# Patient Record
Sex: FEMALE | Race: WHITE | Employment: OTHER | URBAN - METROPOLITAN AREA
[De-identification: names, ages, dates, MRNs, and addresses within clinical notes are randomized per-mention and may not be internally consistent; named-entity substitution may affect disease eponyms.]

---

## 2017-01-04 RX ORDER — FUROSEMIDE 20 MG/1
20 TABLET ORAL DAILY
Qty: 30 TAB | Refills: 2 | Status: SHIPPED | OUTPATIENT
Start: 2017-01-04 | End: 2017-04-25 | Stop reason: SDUPTHER

## 2017-01-04 RX ORDER — GLIMEPIRIDE 1 MG/1
1 TABLET ORAL
Qty: 30 TAB | Refills: 2 | Status: SHIPPED | OUTPATIENT
Start: 2017-01-04 | End: 2017-05-19 | Stop reason: SDUPTHER

## 2017-01-04 RX ORDER — INSULIN GLARGINE 100 [IU]/ML
INJECTION, SOLUTION SUBCUTANEOUS
Qty: 4 EACH | Refills: 2 | Status: SHIPPED | OUTPATIENT
Start: 2017-01-04 | End: 2017-03-10 | Stop reason: SDUPTHER

## 2017-01-13 ENCOUNTER — HOSPITAL ENCOUNTER (EMERGENCY)
Age: 67
Discharge: HOME | End: 2017-01-13
Attending: EMERGENCY MEDICINE
Payer: MEDICARE

## 2017-01-13 ENCOUNTER — APPOINTMENT (OUTPATIENT)
Dept: CT IMAGING | Age: 67
End: 2017-01-13
Attending: EMERGENCY MEDICINE
Payer: MEDICARE

## 2017-01-13 VITALS
DIASTOLIC BLOOD PRESSURE: 64 MMHG | BODY MASS INDEX: 39.8 KG/M2 | HEART RATE: 90 BPM | WEIGHT: 278 LBS | TEMPERATURE: 98 F | RESPIRATION RATE: 16 BRPM | SYSTOLIC BLOOD PRESSURE: 146 MMHG | HEIGHT: 70 IN | OXYGEN SATURATION: 94 %

## 2017-01-13 DIAGNOSIS — K59.01 SLOW TRANSIT CONSTIPATION: Primary | ICD-10-CM

## 2017-01-13 LAB
ALBUMIN SERPL BCP-MCNC: 2.9 G/DL (ref 3.5–5)
ALBUMIN/GLOB SERPL: 0.7 {RATIO} (ref 1.1–2.2)
ALP SERPL-CCNC: 140 U/L (ref 45–117)
ALT SERPL-CCNC: 37 U/L (ref 12–78)
ANION GAP BLD CALC-SCNC: 10 MMOL/L (ref 5–15)
APPEARANCE UR: ABNORMAL
AST SERPL W P-5'-P-CCNC: 33 U/L (ref 15–37)
BACTERIA URNS QL MICRO: NEGATIVE /HPF
BASOPHILS # BLD AUTO: 0 K/UL (ref 0–0.1)
BASOPHILS # BLD: 0 % (ref 0–1)
BILIRUB SERPL-MCNC: 0.6 MG/DL (ref 0.2–1)
BILIRUB UR QL: NEGATIVE
BUN SERPL-MCNC: 21 MG/DL (ref 6–20)
BUN/CREAT SERPL: 20 (ref 12–20)
CALCIUM SERPL-MCNC: 8.6 MG/DL (ref 8.5–10.1)
CHLORIDE SERPL-SCNC: 98 MMOL/L (ref 97–108)
CO2 SERPL-SCNC: 25 MMOL/L (ref 21–32)
COLOR UR: ABNORMAL
CREAT SERPL-MCNC: 1.06 MG/DL (ref 0.55–1.02)
EOSINOPHIL # BLD: 0.1 K/UL (ref 0–0.4)
EOSINOPHIL NFR BLD: 1 % (ref 0–7)
EPITH CASTS URNS QL MICRO: ABNORMAL /LPF
ERYTHROCYTE [DISTWIDTH] IN BLOOD BY AUTOMATED COUNT: 12.9 % (ref 11.5–14.5)
GLOBULIN SER CALC-MCNC: 4 G/DL (ref 2–4)
GLUCOSE SERPL-MCNC: 258 MG/DL (ref 65–100)
GLUCOSE UR STRIP.AUTO-MCNC: 500 MG/DL
HCT VFR BLD AUTO: 37.5 % (ref 35–47)
HGB BLD-MCNC: 12.6 G/DL (ref 11.5–16)
HGB UR QL STRIP: ABNORMAL
HYALINE CASTS URNS QL MICRO: ABNORMAL /LPF (ref 0–5)
KETONES UR QL STRIP.AUTO: ABNORMAL MG/DL
LEUKOCYTE ESTERASE UR QL STRIP.AUTO: NEGATIVE
LIPASE SERPL-CCNC: 65 U/L (ref 73–393)
LYMPHOCYTES # BLD AUTO: 21 % (ref 12–49)
LYMPHOCYTES # BLD: 1.1 K/UL (ref 0.8–3.5)
MCH RBC QN AUTO: 31.9 PG (ref 26–34)
MCHC RBC AUTO-ENTMCNC: 33.6 G/DL (ref 30–36.5)
MCV RBC AUTO: 94.9 FL (ref 80–99)
MONOCYTES # BLD: 0.6 K/UL (ref 0–1)
MONOCYTES NFR BLD AUTO: 12 % (ref 5–13)
NEUTS SEG # BLD: 3.3 K/UL (ref 1.8–8)
NEUTS SEG NFR BLD AUTO: 66 % (ref 32–75)
NITRITE UR QL STRIP.AUTO: NEGATIVE
PH UR STRIP: 5.5 [PH] (ref 5–8)
PLATELET # BLD AUTO: 161 K/UL (ref 150–400)
POTASSIUM SERPL-SCNC: 3.8 MMOL/L (ref 3.5–5.1)
PROT SERPL-MCNC: 6.9 G/DL (ref 6.4–8.2)
PROT UR STRIP-MCNC: 100 MG/DL
RBC # BLD AUTO: 3.95 M/UL (ref 3.8–5.2)
RBC #/AREA URNS HPF: ABNORMAL /HPF (ref 0–5)
SODIUM SERPL-SCNC: 133 MMOL/L (ref 136–145)
SP GR UR REFRACTOMETRY: 1.01 (ref 1–1.03)
UA: UC IF INDICATED,UAUC: ABNORMAL
UROBILINOGEN UR QL STRIP.AUTO: 0.2 EU/DL (ref 0.2–1)
WBC # BLD AUTO: 5 K/UL (ref 3.6–11)
WBC URNS QL MICRO: ABNORMAL /HPF (ref 0–4)

## 2017-01-13 PROCEDURE — 81001 URINALYSIS AUTO W/SCOPE: CPT | Performed by: EMERGENCY MEDICINE

## 2017-01-13 PROCEDURE — 74176 CT ABD & PELVIS W/O CONTRAST: CPT

## 2017-01-13 PROCEDURE — 74011000250 HC RX REV CODE- 250: Performed by: EMERGENCY MEDICINE

## 2017-01-13 PROCEDURE — 83690 ASSAY OF LIPASE: CPT | Performed by: EMERGENCY MEDICINE

## 2017-01-13 PROCEDURE — 99284 EMERGENCY DEPT VISIT MOD MDM: CPT

## 2017-01-13 PROCEDURE — 80053 COMPREHEN METABOLIC PANEL: CPT | Performed by: EMERGENCY MEDICINE

## 2017-01-13 PROCEDURE — 85025 COMPLETE CBC W/AUTO DIFF WBC: CPT | Performed by: EMERGENCY MEDICINE

## 2017-01-13 PROCEDURE — 74011250636 HC RX REV CODE- 250/636: Performed by: EMERGENCY MEDICINE

## 2017-01-13 PROCEDURE — 36415 COLL VENOUS BLD VENIPUNCTURE: CPT | Performed by: EMERGENCY MEDICINE

## 2017-01-13 PROCEDURE — 96374 THER/PROPH/DIAG INJ IV PUSH: CPT

## 2017-01-13 RX ORDER — PROCHLORPERAZINE EDISYLATE 5 MG/ML
10 INJECTION INTRAMUSCULAR; INTRAVENOUS
Status: DISCONTINUED | OUTPATIENT
Start: 2017-01-13 | End: 2017-01-13

## 2017-01-13 RX ORDER — MAGNESIUM CITRATE
296 SOLUTION, ORAL ORAL
Qty: 1 BOTTLE | Refills: 3 | Status: SHIPPED | OUTPATIENT
Start: 2017-01-13 | End: 2017-01-13

## 2017-01-13 RX ORDER — POLYETHYLENE GLYCOL 3350, SODIUM SULFATE ANHYDROUS, SODIUM BICARBONATE, SODIUM CHLORIDE, POTASSIUM CHLORIDE 236; 22.74; 6.74; 5.86; 2.97 G/4L; G/4L; G/4L; G/4L; G/4L
480 POWDER, FOR SOLUTION ORAL
Qty: 480 ML | Refills: 0 | Status: SHIPPED | OUTPATIENT
Start: 2017-01-13 | End: 2017-01-13

## 2017-01-13 RX ORDER — HYDROMORPHONE HYDROCHLORIDE 1 MG/ML
1 INJECTION, SOLUTION INTRAMUSCULAR; INTRAVENOUS; SUBCUTANEOUS
Status: DISCONTINUED | OUTPATIENT
Start: 2017-01-13 | End: 2017-01-13 | Stop reason: HOSPADM

## 2017-01-13 RX ADMIN — SODIUM CHLORIDE 10 MG: 9 INJECTION INTRAMUSCULAR; INTRAVENOUS; SUBCUTANEOUS at 12:38

## 2017-01-13 NOTE — DISCHARGE INSTRUCTIONS
Constipation: Care Instructions  Your Care Instructions  Constipation means that you have a hard time passing stools (bowel movements). People pass stools from 3 times a day to once every 3 days. What is normal for you may be different. Constipation may occur with pain in the rectum and cramping. The pain may get worse when you try to pass stools. Sometimes there are small amounts of bright red blood on toilet paper or the surface of stools. This is because of enlarged veins near the rectum (hemorrhoids). A few changes in your diet and lifestyle may help you avoid ongoing constipation. Your doctor may also prescribe medicine to help loosen your stool. Some medicines can cause constipation. These include pain medicines and antidepressants. Tell your doctor about all the medicines you take. Your doctor may want to make a medicine change to ease your symptoms. Follow-up care is a key part of your treatment and safety. Be sure to make and go to all appointments, and call your doctor if you are having problems. It's also a good idea to know your test results and keep a list of the medicines you take. How can you care for yourself at home? · Drink plenty of fluids, enough so that your urine is light yellow or clear like water. If you have kidney, heart, or liver disease and have to limit fluids, talk with your doctor before you increase the amount of fluids you drink. · Include high-fiber foods in your diet each day. These include fruits, vegetables, beans, and whole grains. · Get at least 30 minutes of exercise on most days of the week. Walking is a good choice. You also may want to do other activities, such as running, swimming, cycling, or playing tennis or team sports. · Take a fiber supplement, such as Citrucel or Metamucil, every day. Read and follow all instructions on the label. · Schedule time each day for a bowel movement. A daily routine may help.  Take your time having your bowel movement. · Support your feet with a small step stool when you sit on the toilet. This helps flex your hips and places your pelvis in a squatting position. · Your doctor may recommend an over-the-counter laxative to relieve your constipation. Examples are Milk of Magnesia and MiraLax. Read and follow all instructions on the label. Do not use laxatives on a long-term basis. When should you call for help? Call your doctor now or seek immediate medical care if:  · You have new or worse belly pain. · You have new or worse nausea or vomiting. · You have blood in your stools. Watch closely for changes in your health, and be sure to contact your doctor if:  · Your constipation is getting worse. · You do not get better as expected. Where can you learn more? Go to http://sonam-douglas.info/. Enter 21 [phone] in the search box to learn more about \"Constipation: Care Instructions. \"  Current as of: May 27, 2016  Content Version: 11.1  © 8147-9536 [employer]. Care instructions adapted under license by [employer] (which disclaims liability or warranty for this information). If you have questions about a medical condition or this instruction, always ask your healthcare professional. [address] any warranty or liability for your use of this information. We hope that we have addressed all of your medical concerns. The examination and treatment you received in the Emergency Department were for an emergent problem and were not intended as complete care. It is important that you follow up with your healthcare provider(s) for ongoing care. If your symptoms worsen or do not improve as expected, and you are unable to reach your usual health care provider(s), you should return to the Emergency Department. Today's healthcare is undergoing tremendous change, and patient satisfaction surveys are one of the many tools to assess the quality of medical care. You may receive a survey from the [employer] regarding your experience in the Emergency Department. I hope that your experience has been completely positive, particularly the medical care that I provided. As such, please participate in the survey; anything less than excellent does not meet my expectations or intentions. [facility] and [facility] participate in nationally recognized quality of care measures. If your blood pressure is greater than 120/80, as reported below, we urge that you seek medical care to address the potential of high blood pressure, commonly known as hypertension. Hypertension can be hereditary or can be caused by certain medical conditions, pain, stress, or \"white coat syndrome. \"       Please make an appointment with your health care provider(s) for follow up of your Emergency Department visit. VITALS:   Patient Vitals for the past 8 hrs:   Temp Pulse Resp BP SpO2   01/13/17 1053 98 °F (36.7 °C) (!) 101 14 188/78 96 %          Thank you for allowing us to provide you with medical care today. We realize that you have many choices for your emergency care needs. Please choose us in the future for any continued health care needs. Priyank Craig, [address] [place] [address].   Office: [phone]            Recent Results (from the past 24 hour(s))   CBC WITH AUTOMATED DIFF    Collection Time: 01/13/17 10:59 AM   Result Value Ref Range    WBC 5.0 3.6 - 11.0 K/uL    RBC 3.95 3.80 - 5.20 M/uL    HGB 12.6 11.5 - 16.0 g/dL    HCT 37.5 35.0 - 47.0 %    MCV 94.9 80.0 - 99.0 FL    MCH 31.9 26.0 - 34.0 PG    MCHC 33.6 30.0 - 36.5 g/dL    RDW 12.9 11.5 - 14.5 %    PLATELET 124 940 - 914 K/uL    NEUTROPHILS 66 32 - 75 %    LYMPHOCYTES 21 12 - 49 %    MONOCYTES 12 5 - 13 %    EOSINOPHILS 1 0 - 7 %    BASOPHILS 0 0 - 1 %    ABS. NEUTROPHILS 3.3 1.8 - 8.0 K/UL    ABS.  LYMPHOCYTES 1.1 0.8 - 3.5 K/UL ABS. MONOCYTES 0.6 0.0 - 1.0 K/UL    ABS. EOSINOPHILS 0.1 0.0 - 0.4 K/UL    ABS. BASOPHILS 0.0 0.0 - 0.1 K/UL   METABOLIC PANEL, COMPREHENSIVE    Collection Time: 01/13/17 10:59 AM   Result Value Ref Range    Sodium 133 (L) 136 - 145 mmol/L    Potassium 3.8 3.5 - 5.1 mmol/L    Chloride 98 97 - 108 mmol/L    CO2 25 21 - 32 mmol/L    Anion gap 10 5 - 15 mmol/L    Glucose 258 (H) 65 - 100 mg/dL    BUN 21 (H) 6 - 20 MG/DL    Creatinine 1.06 (H) 0.55 - 1.02 MG/DL    BUN/Creatinine ratio 20 12 - 20      GFR est AA >60 >60 ml/min/1.73m2    GFR est non-AA 52 (L) >60 ml/min/1.73m2    Calcium 8.6 8.5 - 10.1 MG/DL    Bilirubin, total 0.6 0.2 - 1.0 MG/DL    ALT 37 12 - 78 U/L    AST 33 15 - 37 U/L    Alk. phosphatase 140 (H) 45 - 117 U/L    Protein, total 6.9 6.4 - 8.2 g/dL    Albumin 2.9 (L) 3.5 - 5.0 g/dL    Globulin 4.0 2.0 - 4.0 g/dL    A-G Ratio 0.7 (L) 1.1 - 2.2     LIPASE    Collection Time: 01/13/17 10:59 AM   Result Value Ref Range    Lipase 65 (L) 73 - 393 U/L   URINALYSIS W/ REFLEX CULTURE    Collection Time: 01/13/17  1:33 PM   Result Value Ref Range    Color YELLOW/STRAW      Appearance TURBID (A) CLEAR      Specific gravity 1.014 1.003 - 1.030      pH (UA) 5.5 5.0 - 8.0      Protein 100 (A) NEG mg/dL    Glucose 500 (A) NEG mg/dL    Ketone TRACE (A) NEG mg/dL    Bilirubin NEGATIVE  NEG      Blood TRACE (A) NEG      Urobilinogen 0.2 0.2 - 1.0 EU/dL    Nitrites NEGATIVE  NEG      Leukocyte Esterase NEGATIVE  NEG      WBC 0-4 0 - 4 /hpf    RBC 5-10 0 - 5 /hpf    Epithelial cells MODERATE (A) FEW /lpf    Bacteria NEGATIVE  NEG /hpf    UA:UC IF INDICATED CULTURE NOT INDICATED BY UA RESULT CNI      Hyaline Cast 2-5 0 - 5 /lpf       Ct Abd Pelv Wo Cont    Result Date: 1/13/2017  INDICATION: diffuse abdom pain with nausea - bowel issue. History of breast cancer. COMPARISON: None TECHNIQUE: Thin axial images were obtained through the abdomen and pelvis. Coronal and sagittal reconstructions were generated.  Oral contrast was not administered. CT dose reduction was achieved through use of a standardized protocol tailored for this examination and automatic exposure control for dose modulation. The absence of intravenous contrast material reduces the sensitivity for evaluation of the solid parenchymal organs of the abdomen. FINDINGS: LUNG BASES: Clear. INCIDENTALLY IMAGED HEART AND MEDIASTINUM: Unremarkable. LIVER: No mass or biliary dilatation. The liver is hypodense related to hepatic steatosis. GALLBLADDER: Surgically absent. SPLEEN: No mass. PANCREAS: No mass or ductal dilatation. ADRENALS: Unremarkable KIDNEYS/URETERS: No mass, calculus, or hydronephrosis. The right kidney is incompletely rotated which is incidental. STOMACH: Unremarkable. SMALL BOWEL: No dilatation or wall thickening. COLON: No dilatation or wall thickening. APPENDIX: Unremarkable. PERITONEUM: No ascites or pneumoperitoneum. RETROPERITONEUM: No lymphadenopathy or aortic aneurysm. REPRODUCTIVE ORGANS: The patient is status post hysterectomy. URINARY BLADDER: No mass or calculus. BONES: No destructive bone lesion. Degenerative changes are present in the spine. ADDITIONAL COMMENTS: There is a fat-containing hernia. IMPRESSION: No evidence of acute process. Incidentals as above.

## 2017-01-13 NOTE — ED PROVIDER NOTES
HPI Comments: 78 y.o. female with past medical history significant for HTN, hypercholesterolemia, thyroid lump, morbid obesity, osteoarthritis, retinopathy, breast cancer, diabetes (type II), diverticulitis, and lymphedema of both lower extremities who presents from home with chief complaint of abdominal pain. Pt states that she has had abdominal pain and distension since Wednesday night (2 days ago) and says that the pain \"comes and goes\" and also worsens on movement. Pt reports recently being prescribed Doxycycline for a sore throat and states that her symptoms started \"almost immediately\" after taking one pill. She reports having constipation and states that she has not had a bowel movement since Wednesday night (2 days ago) and has also been nauseous, but unable to vomit. Pt reports that her pain ranges from a 5/10 to 8/10 but is at 5/10 right now. She also has a ventral hernia. She Pt reports having lymphedema in both legs but states that the swelling has decreased. There are no other acute medical concerns at this time. Social hx: nonsmoker, no EtOH use, no drug use   PCP: German Correia MD     Note written by Rakesh Lemus, as dictated by Denyce Snellen, MD 11:41 AM      The history is provided by the patient. No  was used.         Past Medical History:   Diagnosis Date    Cancer [facility])      right breast ca with 12 lymph nodes involved    Diabetes ([address]     Diverticulitis     Hypercholesterolemia     Hypertension     Lymphedema of both lower extremities     Morbid obesity ([address]     Osteoarthritis 10/13/2010    Retinopathy due to secondary diabetes ([address]     Thyroid lump      nodule       Past Surgical History:   Procedure Laterality Date    Hx hysterectomy      Hx refractive surgery      Hx retinal detachment repair      Hx cholecystectomy      Hx gyn       hysterectomy    Hx mastectomy      Hx cataract removal      Colonoscopy N/A 8/24/2016     COLONOSCOPY performed by Pamela Lugo MD at [facility] END[facility]COPY         Family History:   Problem Relation Age of Onset    Heart Disease Mother     Heart Disease Father        Social History     Social History    Marital status:      Spouse name: N/A    Number of children: N/A    Years of education: N/A     Occupational History    Not on file. Social History Main Topics    Smoking status: Never Smoker    Smokeless tobacco: Never Used    Alcohol use No    Drug use: No    Sexual activity: Yes     Partners: Male     Other Topics Concern    Not on file     Social History Narrative    ** Merged History Encounter **              ALLERGIES: Bee sting [sting, bee]; Heparin analogues; and No known drug allergies    Review of Systems   Constitutional: Negative for appetite change, chills and fever. HENT: Negative for congestion. Eyes: Negative for visual disturbance. Respiratory: Negative for cough, chest tightness, shortness of breath and wheezing. Cardiovascular: Negative for chest pain. Gastrointestinal: Positive for abdominal pain, constipation and nausea. Negative for vomiting. Genitourinary: Negative for dysuria and frequency. Musculoskeletal: Negative for joint swelling. Skin: Negative for rash. Neurological: Negative for speech difficulty and headaches. All other systems reviewed and are negative. Vitals:    01/13/17 1053   BP: 188/78   Pulse: (!) 101   Resp: 14   Temp: 98 °F (36.7 °C)   SpO2: 96%   Weight: 126.1 kg (278 lb)   Height: 5' 10\" (1.778 m)            Physical Exam   Constitutional: She is oriented to person, place, and time. She appears well-developed and well-nourished. No distress. HENT:   Head: Normocephalic and atraumatic. Nose: Nose normal.   Eyes: Conjunctivae are normal. Pupils are equal, round, and reactive to light. No scleral icterus. Neck: Normal range of motion. Neck supple. No JVD present. No tracheal deviation present. No thyromegaly present.    Cardiovascular: Normal rate, regular rhythm and normal heart sounds. No murmur heard. Pulmonary/Chest: Effort normal and breath sounds normal. No respiratory distress. She has no wheezes. She has no rales. Abdominal: Soft. Bowel sounds are normal. She exhibits no mass. There is no tenderness. There is no rebound and no guarding. Diffuse belly pain noted   Musculoskeletal: Normal range of motion. She exhibits no edema. Neurological: She is alert and oriented to person, place, and time. No cranial nerve deficit. Coordination normal.   Skin: Skin is warm and dry. No rash noted. She is not diaphoretic. No erythema. Psychiatric: She has a normal mood and affect. Her behavior is normal. Judgment and thought content normal.   Nursing note and vitals reviewed.        MDM  ED Course       Procedures           2:00 PM  The pt declines the enema here but wishes meds for home for constipation

## 2017-01-13 NOTE — ED TRIAGE NOTES
Triage Note:  Pt arrived via EMS from home for complaints of vomiting and abdominal pain. Pt given IV Zofran and 25 mcg Fentanyl en route with EMS. Pt has hx of diabetes.  with EMS.

## 2017-01-20 ENCOUNTER — HOSPITAL ENCOUNTER (OUTPATIENT)
Dept: LAB | Age: 67
Discharge: HOME | End: 2017-01-20
Payer: MEDICARE

## 2017-01-20 ENCOUNTER — OFFICE VISIT (OUTPATIENT)
Dept: INTERNAL MEDICINE CLINIC | Age: 67
End: 2017-01-20

## 2017-01-20 VITALS
OXYGEN SATURATION: 98 % | HEIGHT: 70 IN | WEIGHT: 281 LBS | BODY MASS INDEX: 40.23 KG/M2 | TEMPERATURE: 97.8 F | HEART RATE: 98 BPM | DIASTOLIC BLOOD PRESSURE: 88 MMHG | RESPIRATION RATE: 16 BRPM | SYSTOLIC BLOOD PRESSURE: 142 MMHG

## 2017-01-20 DIAGNOSIS — K59.01 SLOW TRANSIT CONSTIPATION: Primary | ICD-10-CM

## 2017-01-20 DIAGNOSIS — E87.1 HYPONATREMIA: ICD-10-CM

## 2017-01-20 PROCEDURE — 36415 COLL VENOUS BLD VENIPUNCTURE: CPT

## 2017-01-20 PROCEDURE — 80053 COMPREHEN METABOLIC PANEL: CPT

## 2017-01-20 RX ORDER — ERGOCALCIFEROL 1.25 MG/1
50000 CAPSULE ORAL
COMMUNITY
End: 2017-05-27

## 2017-01-20 RX ORDER — ONDANSETRON 4 MG/1
TABLET, ORALLY DISINTEGRATING ORAL
COMMUNITY
Start: 2017-01-12 | End: 2017-05-27

## 2017-01-20 RX ORDER — LEVOFLOXACIN 500 MG/1
TABLET, FILM COATED ORAL
COMMUNITY
Start: 2017-01-12 | End: 2017-01-20 | Stop reason: ALTCHOICE

## 2017-01-20 NOTE — PROGRESS NOTES
Written by Keon Brush, as dictated by Dr. Ruthann Strickland MD.    Marianela Warner is a 78 y.o. female. HPI  The patient comes in today for a follow up from the hospital. She went to the ED because her stomach was distended and hard. The ED told her it was constipation and she drank magnesium citrate which did not work. She went to an  for a sore throat and she was prescribed doxycycline which made her sick. She has not moved her bowels. She was also give the bottle of Go-lytely, but she has not taken it yet. Her BS has been running 250 consistently. She has been taking 10 units of Novalog before meals and then she takes 20 units of lantus at night. Patient Active Problem List   Diagnosis Code    Insulin dependent diabetes mellitus ([facility] E11.9, Z79.4    Essential hypertension with goal blood pressure less than 140/90 I10    HX: breast cancer Z85.3    Lymphedema of both lower extremities I89.0    MAXWELL (dyspnea on exertion) R06.09    Joint pain M25.50    Hypertension I10    Diabetes ([facility] E11.9    Morbid obesity (HCC) E66.01    Osteoarthritis M19.90        Current Outpatient Prescriptions on File Prior to Visit   Medication Sig Dispense Refill    insulin glargine (LANTUS SOLOSTAR) 100 unit/mL (3 mL) pen 20  units daily 4 Each 2    glimepiride (AMARYL) 1 mg tablet Take 1 Tab by mouth Daily (before breakfast). 30 Tab 2    furosemide (LASIX) 20 mg tablet Take 1 Tab by mouth daily. 30 Tab 2    insulin aspart (NOVOLOG) 100 unit/mL inpn 10 units before meals. Tid. 3 mL 2    atorvastatin (LIPITOR) 20 mg tablet Take 1 Tab by mouth daily. 30 Tab 2    lisinopril (PRINIVIL, ZESTRIL) 20 mg tablet Take 1 Tab by mouth daily for 90 days. 90 Tab 0    Insulin Needles, Disposable, 31 gauge x 5/16\" ndle by SubCUTAneous route daily. Use with insulin pen qid 360 Pen Needle 2    atorvastatin (LIPITOR) 20 mg tablet Take 1 Tab by mouth daily for 90 days.  30 Tab 2    Cholestyramine-Aspartame 4 gram powder Take  by mouth three (3) times daily (with meals).  glucose blood VI test strips (CONTOUR NEXT STRIPS) strip Check blood sugar 4 times a day and as needed E13.8 100 Strip 4    glucose blood VI test strips (ASCENSIA AUTODISC VI, ONE TOUCH ULTRA TEST VI) strip Check BS QID and PRN-ICD code: E13.8 200 Strip 2    Blood-Glucose Meter monitoring kit by Does Not Apply route. No current facility-administered medications on file prior to visit. Allergies   Allergen Reactions    Bee Sting [Sting, Bee] Hives    Heparin Analogues Vertigo    No Known Drug Allergies Other (comments)       Past Medical History   Diagnosis Date    Cancer ([address]      right breast ca with 12 lymph nodes involved    Diabetes ([address]     Diverticulitis     Hypercholesterolemia     Hypertension     Lymphedema of both lower extremities     Morbid obesity ([address]     Osteoarthritis 10/13/2010    Retinopathy due to secondary diabetes ([address]     Thyroid lump      nodule       Past Surgical History   Procedure Laterality Date    Hx hysterectomy      Hx refractive surgery      Hx retinal detachment repair      Hx cholecystectomy      Hx gyn       hysterectomy    Hx mastectomy      Hx cataract removal      Colonoscopy N/A 8/24/2016     COLONOSCOPY performed by Negro Lehman MD at [facility] ENDOSCOPY       Family History   Problem Relation Age of Onset    Heart Disease Mother     Heart Disease Father        Social History     Social History    Marital status:      Spouse name: N/A    Number of children: N/A    Years of education: N/A     Occupational History    Not on file.      Social History Main Topics    Smoking status: Never Smoker    Smokeless tobacco: Never Used    Alcohol use No    Drug use: No    Sexual activity: Yes     Partners: Male     Other Topics Concern    Not on file     Social History Narrative    ** Merged History Encounter **            Admission on 01/13/2017, Discharged on 01/13/2017   Component Date Value Ref Range Status    WBC 01/13/2017 5.0  3.6 - 11.0 K/uL Final    RBC 01/13/2017 3.95  3.80 - 5.20 M/uL Final    HGB 01/13/2017 12.6  11.5 - 16.0 g/dL Final    HCT 01/13/2017 37.5  35.0 - 47.0 % Final    MCV 01/13/2017 94.9  80.0 - 99.0 FL Final    MCH 01/13/2017 31.9  26.0 - 34.0 PG Final    MCHC 01/13/2017 33.6  30.0 - 36.5 g/dL Final    RDW 01/13/2017 12.9  11.5 - 14.5 % Final    PLATELET 26/39/0812 381  150 - 400 K/uL Final    NEUTROPHILS 01/13/2017 66  32 - 75 % Final    LYMPHOCYTES 01/13/2017 21  12 - 49 % Final    MONOCYTES 01/13/2017 12  5 - 13 % Final    EOSINOPHILS 01/13/2017 1  0 - 7 % Final    BASOPHILS 01/13/2017 0  0 - 1 % Final    ABS. NEUTROPHILS 01/13/2017 3.3  1.8 - 8.0 K/UL Final    ABS. LYMPHOCYTES 01/13/2017 1.1  0.8 - 3.5 K/UL Final    ABS. MONOCYTES 01/13/2017 0.6  0.0 - 1.0 K/UL Final    ABS. EOSINOPHILS 01/13/2017 0.1  0.0 - 0.4 K/UL Final    ABS. BASOPHILS 01/13/2017 0.0  0.0 - 0.1 K/UL Final    Sodium 01/13/2017 133* 136 - 145 mmol/L Final    Potassium 01/13/2017 3.8  3.5 - 5.1 mmol/L Final    Chloride 01/13/2017 98  97 - 108 mmol/L Final    CO2 01/13/2017 25  21 - 32 mmol/L Final    Anion gap 01/13/2017 10  5 - 15 mmol/L Final    Glucose 01/13/2017 258* 65 - 100 mg/dL Final    BUN 01/13/2017 21* 6 - 20 MG/DL Final    Creatinine 01/13/2017 1.06* 0.55 - 1.02 MG/DL Final    BUN/Creatinine ratio 01/13/2017 20  12 - 20   Final    GFR est AA 01/13/2017 >60  >60 ml/min/1.73m2 Final    GFR est non-AA 01/13/2017 52* >60 ml/min/1.73m2 Final    Comment: Estimated GFR is calculated using the IDMS-traceable Modification of Diet in Renal Disease (MDRD) Study equation, reported for both  Americans (GFRAA) and non- Americans (GFRNA), and normalized to 1.73m2 body surface area. The physician must decide which value applies to the patient.   The MDRD study equation should only be used in individuals age 25 or older. It has not been validated for the following: pregnant women, patients with serious comorbid conditions, or on certain medications, or persons with extremes of body size, muscle mass, or nutritional status.  Calcium 01/13/2017 8.6  8.5 - 10.1 MG/DL Final    Bilirubin, total 01/13/2017 0.6  0.2 - 1.0 MG/DL Final    ALT 01/13/2017 37  12 - 78 U/L Final    AST 01/13/2017 33  15 - 37 U/L Final    Alk. phosphatase 01/13/2017 140* 45 - 117 U/L Final    Protein, total 01/13/2017 6.9  6.4 - 8.2 g/dL Final    Albumin 01/13/2017 2.9* 3.5 - 5.0 g/dL Final    Globulin 01/13/2017 4.0  2.0 - 4.0 g/dL Final    A-G Ratio 01/13/2017 0.7* 1.1 - 2.2   Final    Lipase 01/13/2017 65* 73 - 393 U/L Final    Color 01/13/2017 YELLOW/STRAW    Final    Color Reference Range: Straw, Yellow or Dark Yellow    Appearance 01/13/2017 TURBID* CLEAR   Final    Specific gravity 01/13/2017 1.014  1.003 - 1.030   Final    pH (UA) 01/13/2017 5.5  5.0 - 8.0   Final    Protein 01/13/2017 100* NEG mg/dL Final    Glucose 01/13/2017 500* NEG mg/dL Final    Ketone 01/13/2017 TRACE* NEG mg/dL Final    Bilirubin 01/13/2017 NEGATIVE   NEG   Final    Blood 01/13/2017 TRACE* NEG   Final    Urobilinogen 01/13/2017 0.2  0.2 - 1.0 EU/dL Final    Nitrites 01/13/2017 NEGATIVE   NEG   Final    Leukocyte Esterase 01/13/2017 NEGATIVE   NEG   Final    WBC 01/13/2017 0-4  0 - 4 /hpf Final    RBC 01/13/2017 5-10  0 - 5 /hpf Final    Epithelial cells 01/13/2017 MODERATE* FEW /lpf Final    Epithelial cell category consists of squamous cells and /or transitional urothelial cells. Renal tubular cells, if present, are separately identified as such.  Bacteria 01/13/2017 NEGATIVE   NEG /hpf Final    UA:UC IF INDICATED 01/13/2017 CULTURE NOT INDICATED BY UA RESULT  CNI   Final    Hyaline Cast 01/13/2017 2-5  0 - 5 /lpf Final       Review of Systems   Constitutional: Negative for malaise/fatigue.    Respiratory: Negative for cough and wheezing. Cardiovascular: Negative for chest pain and palpitations. Gastrointestinal: Positive for constipation. Negative for abdominal pain and heartburn. Neurological: Negative for weakness and headaches. Visit Vitals    /88 (BP 1 Location: Left arm, BP Patient Position: Sitting)    Pulse 98    Temp 97.8 °F (36.6 °C) (Oral)    Resp 16    Ht 5' 10\" (1.778 m)    Wt 281 lb (127.5 kg)    SpO2 98%    BMI 40.32 kg/m2     Physical Exam   Constitutional: She is oriented to person, place, and time. She appears well-nourished. No distress. HENT:   Right Ear: External ear normal.   Left Ear: External ear normal.   Mouth/Throat: Oropharynx is clear and moist.   Eyes: Conjunctivae and EOM are normal. Right eye exhibits no discharge. Left eye exhibits no discharge. Neck: Normal range of motion. Neck supple. Cardiovascular: Normal rate and regular rhythm. Pulmonary/Chest: Effort normal and breath sounds normal. She has no wheezes. Abdominal: Soft. Bowel sounds are normal. She exhibits distension. Neurological: She is alert and oriented to person, place, and time. Skin: Skin is intact. Psychiatric: She has a normal mood and affect. Nursing note and vitals reviewed. ASSESSMENT and PLAN    ICD-10-CM ICD-9-CM    1. Slow transit constipation K59.01 564.01 linaclotide (LINZESS) 145 mcg cap capsule script given to patient    I want her to use the Go-Lytely  at this time. I will send her Linzess to the pharmacy. If the lower dose does not work then she can take 2 pills. 2. Insulin dependent diabetes mellitus (HCC) E11.9 250.00 I want her to increase her Lantus at night to 24 and if her BS is still above 150 then I want her to go up to 26 and 28 units to get it below 150. Z79.4 V58.67    3. Hyponatremia B44.5 120.1 METABOLIC PANEL, COMPREHENSIVE    Her sodium was low and I wanted to recheck her levels. And look at her kidney function.       This plan was reviewed with the patient and patient agrees. All questions were answered. This scribe documentation was reviewed by me and accurately reflects the examination and decisions made by me. This note will not be viewable in [address]

## 2017-01-20 NOTE — PROGRESS NOTES
Chief Complaint   Patient presents with    Follow-up     jan 4th went to er for hard abdomin, went to [facility]. did not admit.  states that they gave medication for constipation. [facility] was prescribed doxycycline and it made patient violently ill.

## 2017-01-20 NOTE — LETTER
1/23/2017 12:30 PM 
 
Ms. Corey Carpio [id number] Dear Corey Carpio: 
 
Please find your most recent results below. Resulted Orders METABOLIC PANEL, COMPREHENSIVE Result Value Ref Range Glucose 139 (H) 65 - 99 mg/dL BUN 14 8 - 27 mg/dL Creatinine 0.74 0.57 - 1.00 mg/dL GFR est non-AA 84 >59 mL/min/1.73 GFR est AA 97 >59 mL/min/1.73  
 BUN/Creatinine ratio 19 11 - 26 Sodium 140 134 - 144 mmol/L Potassium 5.1 3.5 - 5.2 mmol/L Chloride 97 96 - 106 mmol/L  
 CO2 28 18 - 29 mmol/L Calcium 9.5 8.7 - 10.3 mg/dL Protein, total 6.3 6.0 - 8.5 g/dL Albumin 3.5 (L) 3.6 - 4.8 g/dL GLOBULIN, TOTAL 2.8 1.5 - 4.5 g/dL A-G Ratio 1.3 1.1 - 2.5 Bilirubin, total 0.5 0.0 - 1.2 mg/dL Alk. phosphatase 114 39 - 117 IU/L  
 AST 26 0 - 40 IU/L  
 ALT 30 0 - 32 IU/L Narrative Performed at:  [address]  [id number] : Emily Sigala MD, Phone:  [phone] RECOMMENDATIONS: 
Your sodium & creatinine has improved. Please call me if you have any questions: [phone] Sincerely, Tanya Choe MD

## 2017-01-20 NOTE — MR AVS SNAPSHOT
Visit Information Date & Time Provider Department Dept. Phone Encounter #  
 1/20/2017 12:30 PM Laurita Shone, [address] [facility],[address] Internal Medicine [phone] [id number] Your Appointments 10/18/2017  1:00 PM  
ESTABLISHED PATIENT with Montez Britt MD  
[facility] ([address]) Appt Note: one year follow up  
 [address] [facility] [address]  
[address] [address] Upcoming Health Maintenance Date Due Hepatitis C Screening 1950 FOOT EXAM Q1 7/31/1959 MICROALBUMIN Q1 7/31/1959 EYE EXAM RETINAL OR DILATED Q1 7/31/1959 DTaP/Tdap/Td series (1 - Tdap) 7/31/1970 BREAST CANCER SCRN MAMMOGRAM 7/31/1999 FOBT Q 1 YEAR AGE 50-75 7/31/1999 ZOSTER VACCINE AGE 60> 7/31/2009 GLAUCOMA SCREENING Q2Y 7/31/2014 OSTEOPOROSIS SCREENING (DEXA) 7/31/2014 Pneumococcal 65+ Low/Medium Risk (1 of 2 - PCV13) 7/31/2014 MEDICARE YEARLY EXAM 7/31/2014 INFLUENZA AGE 9 TO ADULT 8/1/2016 HEMOGLOBIN A1C Q6M 4/19/2017 LIPID PANEL Q1 10/19/2017 Allergies as of 1/20/2017  Review Complete On: 1/20/2017 By: Tommy Jones LPN Severity Noted Reaction Type Reactions Bee Sting [Sting, Bee]  10/13/2010    Hives Heparin Analogues  06/22/2016    Vertigo No Known Drug Allergies  10/13/2010    Other (comments) Current Immunizations  Never Reviewed No immunizations on file. Not reviewed this visit You Were Diagnosed With   
  
 Codes Comments Slow transit constipation    -  Primary ICD-10-CM: K59.01 
ICD-9-CM: 564.01 Insulin dependent diabetes mellitus (HCC)     ICD-10-CM: E11.9, Z79.4 ICD-9-CM: 250.00, V58.67 Hyponatremia     ICD-10-CM: E87.1 ICD-9-CM: 276.1 Vitals BP Pulse Temp Resp Height(growth percentile) Weight(growth percentile)  142/88 (BP 1 Location: Left arm, BP Patient Position: Sitting) 98 97.8 °F (36.6 °C) (Oral) 16 5' 10\" (1.778 m) 281 lb (127.5 kg) SpO2 BMI OB Status Smoking Status 98% 40.32 kg/m2 Hysterectomy Never Smoker BMI and BSA Data Body Mass Index Body Surface Area  
 40.32 kg/m 2 2.51 m 2 Preferred Pharmacy Pharmacy Name Phone Sudheer Barclay [address] [facility], [address] [place] [address] [phone] Your Updated Medication List  
  
   
This list is accurate as of: 1/20/17  1:20 PM.  Always use your most recent med list.  
  
  
  
  
 * atorvastatin 20 mg tablet Commonly known as:  LIPITOR Take 1 Tab by mouth daily for 90 days. * atorvastatin 20 mg tablet Commonly known as:  LIPITOR Take 1 Tab by mouth daily. Blood-Glucose Meter monitoring kit  
by Does Not Apply route. Cholestyramine-Aspartame 4 gram powder Take  by mouth three (3) times daily (with meals). furosemide 20 mg tablet Commonly known as:  LASIX Take 1 Tab by mouth daily. glimepiride 1 mg tablet Commonly known as:  AMARYL Take 1 Tab by mouth Daily (before breakfast). * glucose blood VI test strips strip Commonly known as:  ASCENSIA AUTODISC VI, ONE TOUCH ULTRA TEST VI Check BS QID and PRN-ICD code: E13.8 * glucose blood VI test strips strip Commonly known as:  CONTOUR NEXT STRIPS Check blood sugar 4 times a day and as needed E13.8  
  
 insulin aspart 100 unit/mL Inpn Commonly known as:  Cristobal Carvajal 10 units before meals. Tid.  
  
 insulin glargine 100 unit/mL (3 mL) pen Commonly known as:  LANTUS SOLOSTAR  
20  units daily Insulin Needles (Disposable) 31 gauge x 5/16\" Ndle  
by SubCUTAneous route daily. Use with insulin pen qid  
  
 linaclotide 145 mcg Cap capsule Commonly known as:  Baltazar Garay Take 1 Cap by mouth Daily (before breakfast) for 30 days. lisinopril 20 mg tablet Commonly known as:  Herman Torres Take 1 Tab by mouth daily for 90 days. ondansetron 4 mg disintegrating tablet Commonly known as:  ZOFRAN ODT  
  
 VITAMIN D2 50,000 unit capsule Generic drug:  ergocalciferol Take 50,000 Units by mouth. * Notice: This list has 4 medication(s) that are the same as other medications prescribed for you. Read the directions carefully, and ask your doctor or other care provider to review them with you. Prescriptions Printed Refills  
 linaclotide (LINZESS) 145 mcg cap capsule 1 Sig: Take 1 Cap by mouth Daily (before breakfast) for 30 days. Class: Print Route: Oral  
  
We Performed the Following METABOLIC PANEL, COMPREHENSIVE [99548 CPT(R)] Introducing [facility] SERVICES! Dear Ronnie Gary: Thank you for requesting a [employer] account. Our records indicate that you already have an active [employer] account. You can access your account anytime at [email or web address] Did you know that you can access your hospital and ER discharge instructions at any time in [employer]? You can also review all of your test results from your hospital stay or ER visit. Additional Information If you have questions, please visit the Frequently Asked Questions section of the [employer] website at [email or web address] Remember, [employer] is NOT to be used for urgent needs. For medical emergencies, dial 911. Now available from your iPhone and Android! Please provide this summary of care documentation to your next provider. Your primary care clinician is listed as Candis Links. If you have any questions after today's visit, please call [phone].

## 2017-01-21 LAB
ALBUMIN SERPL-MCNC: 3.5 G/DL (ref 3.6–4.8)
ALBUMIN/GLOB SERPL: 1.3 {RATIO} (ref 1.1–2.5)
ALP SERPL-CCNC: 114 IU/L (ref 39–117)
ALT SERPL-CCNC: 30 IU/L (ref 0–32)
AST SERPL-CCNC: 26 IU/L (ref 0–40)
BILIRUB SERPL-MCNC: 0.5 MG/DL (ref 0–1.2)
BUN SERPL-MCNC: 14 MG/DL (ref 8–27)
BUN/CREAT SERPL: 19 (ref 11–26)
CALCIUM SERPL-MCNC: 9.5 MG/DL (ref 8.7–10.3)
CHLORIDE SERPL-SCNC: 97 MMOL/L (ref 96–106)
CO2 SERPL-SCNC: 28 MMOL/L (ref 18–29)
CREAT SERPL-MCNC: 0.74 MG/DL (ref 0.57–1)
GLOBULIN SER CALC-MCNC: 2.8 G/DL (ref 1.5–4.5)
GLUCOSE SERPL-MCNC: 139 MG/DL (ref 65–99)
POTASSIUM SERPL-SCNC: 5.1 MMOL/L (ref 3.5–5.2)
PROT SERPL-MCNC: 6.3 G/DL (ref 6–8.5)
SODIUM SERPL-SCNC: 140 MMOL/L (ref 134–144)

## 2017-01-30 RX ORDER — LISINOPRIL 20 MG/1
20 TABLET ORAL DAILY
Qty: 90 TAB | Refills: 0 | Status: SHIPPED | OUTPATIENT
Start: 2017-01-30 | End: 2017-04-30

## 2017-03-02 RX ORDER — INSULIN ASPART 100 [IU]/ML
INJECTION, SOLUTION INTRAVENOUS; SUBCUTANEOUS
Qty: 3 ML | Refills: 2 | Status: SHIPPED | OUTPATIENT
Start: 2017-03-02 | End: 2017-09-25 | Stop reason: DRUGHIGH

## 2017-03-10 RX ORDER — METFORMIN HYDROCHLORIDE 500 MG/1
500 TABLET ORAL 2 TIMES DAILY WITH MEALS
Qty: 60 TAB | Refills: 0 | Status: SHIPPED | OUTPATIENT
Start: 2017-03-10 | End: 2017-05-27

## 2017-03-10 RX ORDER — INSULIN GLARGINE 100 [IU]/ML
42 INJECTION, SOLUTION SUBCUTANEOUS DAILY
Qty: 2 PEN | Refills: 3 | Status: SHIPPED | OUTPATIENT
Start: 2017-03-10 | End: 2017-05-27 | Stop reason: DRUGHIGH

## 2017-03-10 NOTE — TELEPHONE ENCOUNTER
Pt informed writer that she was currently taking 42 units Lantus and metformin 500 mg BID. She has stopped the amaryl and restarted metformin in its place. Have placed refills as requested and inform MD. Informed ramesh to make a 3 mth f/u next month.

## 2017-04-26 RX ORDER — FUROSEMIDE 20 MG/1
TABLET ORAL
Qty: 30 TAB | Refills: 1 | Status: SHIPPED | OUTPATIENT
Start: 2017-04-26 | End: 2017-05-27 | Stop reason: DRUGHIGH

## 2017-05-22 RX ORDER — GLIMEPIRIDE 1 MG/1
1 TABLET ORAL
Qty: 30 TAB | Refills: 2 | Status: SHIPPED | OUTPATIENT
Start: 2017-05-22 | End: 2017-05-27 | Stop reason: DRUGHIGH

## 2017-05-27 ENCOUNTER — HOSPITAL ENCOUNTER (INPATIENT)
Age: 67
LOS: 5 days | Discharge: HOME | DRG: 243 | End: 2017-06-01
Attending: EMERGENCY MEDICINE | Admitting: HOSPITALIST
Payer: MEDICARE

## 2017-05-27 ENCOUNTER — APPOINTMENT (OUTPATIENT)
Dept: ULTRASOUND IMAGING | Age: 67
DRG: 243 | End: 2017-05-27
Attending: HOSPITALIST
Payer: MEDICARE

## 2017-05-27 ENCOUNTER — APPOINTMENT (OUTPATIENT)
Dept: GENERAL RADIOLOGY | Age: 67
DRG: 243 | End: 2017-05-27
Attending: EMERGENCY MEDICINE
Payer: MEDICARE

## 2017-05-27 ENCOUNTER — APPOINTMENT (OUTPATIENT)
Dept: CT IMAGING | Age: 67
DRG: 243 | End: 2017-05-27
Attending: HOSPITALIST
Payer: MEDICARE

## 2017-05-27 ENCOUNTER — APPOINTMENT (OUTPATIENT)
Dept: CT IMAGING | Age: 67
DRG: 243 | End: 2017-05-27
Attending: EMERGENCY MEDICINE
Payer: MEDICARE

## 2017-05-27 DIAGNOSIS — R55 SYNCOPE AND COLLAPSE: Primary | ICD-10-CM

## 2017-05-27 DIAGNOSIS — K21.00 GASTROESOPHAGEAL REFLUX DISEASE WITH ESOPHAGITIS: ICD-10-CM

## 2017-05-27 DIAGNOSIS — I89.0 LYMPHEDEMA: ICD-10-CM

## 2017-05-27 PROBLEM — R19.8 ALTERNATING CONSTIPATION AND DIARRHEA: Status: ACTIVE | Noted: 2017-05-27

## 2017-05-27 PROBLEM — R10.9 ABDOMINAL PAIN: Status: ACTIVE | Noted: 2017-05-27

## 2017-05-27 PROBLEM — I44.7 LBBB (LEFT BUNDLE BRANCH BLOCK): Status: ACTIVE | Noted: 2017-05-27

## 2017-05-27 LAB
ALBUMIN SERPL BCP-MCNC: 3 G/DL (ref 3.5–5)
ALBUMIN/GLOB SERPL: 0.7 {RATIO} (ref 1.1–2.2)
ALP SERPL-CCNC: 135 U/L (ref 45–117)
ALT SERPL-CCNC: 103 U/L (ref 12–78)
AMYLASE SERPL-CCNC: 23 U/L (ref 25–115)
ANION GAP BLD CALC-SCNC: 10 MMOL/L (ref 5–15)
APPEARANCE UR: CLEAR
APTT PPP: 25.4 SEC (ref 22.1–32.5)
AST SERPL W P-5'-P-CCNC: 75 U/L (ref 15–37)
ATRIAL RATE: 54 BPM
ATRIAL RATE: 91 BPM
BACTERIA URNS QL MICRO: NEGATIVE /HPF
BASOPHILS # BLD AUTO: 0 K/UL (ref 0–0.1)
BASOPHILS # BLD: 0 % (ref 0–1)
BILIRUB SERPL-MCNC: 0.4 MG/DL (ref 0.2–1)
BILIRUB UR QL: NEGATIVE
BNP SERPL-MCNC: 211 PG/ML (ref 0–100)
BUN SERPL-MCNC: 29 MG/DL (ref 6–20)
BUN/CREAT SERPL: 25 (ref 12–20)
CALCIUM SERPL-MCNC: 9.3 MG/DL (ref 8.5–10.1)
CALCULATED P AXIS, ECG09: 57 DEGREES
CALCULATED R AXIS, ECG10: -41 DEGREES
CALCULATED R AXIS, ECG10: -41 DEGREES
CALCULATED T AXIS, ECG11: 67 DEGREES
CALCULATED T AXIS, ECG11: 96 DEGREES
CHLORIDE SERPL-SCNC: 102 MMOL/L (ref 97–108)
CK MB CFR SERPL CALC: 2.6 % (ref 0–2.5)
CK MB CFR SERPL CALC: 2.8 % (ref 0–2.5)
CK MB CFR SERPL CALC: 2.8 % (ref 0–2.5)
CK MB SERPL-MCNC: 1.9 NG/ML (ref 5–25)
CK MB SERPL-MCNC: 2.1 NG/ML (ref 5–25)
CK MB SERPL-MCNC: 2.8 NG/ML (ref 5–25)
CK SERPL-CCNC: 101 U/L (ref 26–192)
CK SERPL-CCNC: 74 U/L (ref 26–192)
CK SERPL-CCNC: 75 U/L (ref 26–192)
CO2 SERPL-SCNC: 25 MMOL/L (ref 21–32)
COLOR UR: ABNORMAL
CREAT SERPL-MCNC: 1.16 MG/DL (ref 0.55–1.02)
DIAGNOSIS, 93000: NORMAL
DIAGNOSIS, 93000: NORMAL
EOSINOPHIL # BLD: 0.1 K/UL (ref 0–0.4)
EOSINOPHIL NFR BLD: 1 % (ref 0–7)
EPITH CASTS URNS QL MICRO: ABNORMAL /LPF
ERYTHROCYTE [DISTWIDTH] IN BLOOD BY AUTOMATED COUNT: 13.2 % (ref 11.5–14.5)
EST. AVERAGE GLUCOSE BLD GHB EST-MCNC: 209 MG/DL
GLOBULIN SER CALC-MCNC: 4.3 G/DL (ref 2–4)
GLUCOSE BLD STRIP.AUTO-MCNC: 188 MG/DL (ref 65–100)
GLUCOSE BLD STRIP.AUTO-MCNC: 273 MG/DL (ref 65–100)
GLUCOSE BLD STRIP.AUTO-MCNC: 379 MG/DL (ref 65–100)
GLUCOSE SERPL-MCNC: 297 MG/DL (ref 65–100)
GLUCOSE UR STRIP.AUTO-MCNC: 500 MG/DL
HBA1C MFR BLD: 8.9 % (ref 4.2–6.3)
HCT VFR BLD AUTO: 39.1 % (ref 35–47)
HGB BLD-MCNC: 13 G/DL (ref 11.5–16)
HGB UR QL STRIP: ABNORMAL
HYALINE CASTS URNS QL MICRO: ABNORMAL /LPF (ref 0–5)
INR PPP: 1 (ref 0.9–1.1)
KETONES UR QL STRIP.AUTO: NEGATIVE MG/DL
LEUKOCYTE ESTERASE UR QL STRIP.AUTO: ABNORMAL
LIPASE SERPL-CCNC: 93 U/L (ref 73–393)
LYMPHOCYTES # BLD AUTO: 21 % (ref 12–49)
LYMPHOCYTES # BLD: 1.5 K/UL (ref 0.8–3.5)
MAGNESIUM SERPL-MCNC: 1.8 MG/DL (ref 1.6–2.4)
MCH RBC QN AUTO: 31.9 PG (ref 26–34)
MCHC RBC AUTO-ENTMCNC: 33.2 G/DL (ref 30–36.5)
MCV RBC AUTO: 96.1 FL (ref 80–99)
MONOCYTES # BLD: 0.5 K/UL (ref 0–1)
MONOCYTES NFR BLD AUTO: 7 % (ref 5–13)
NEUTS SEG # BLD: 5 K/UL (ref 1.8–8)
NEUTS SEG NFR BLD AUTO: 71 % (ref 32–75)
NITRITE UR QL STRIP.AUTO: NEGATIVE
P-R INTERVAL, ECG05: 176 MS
P-R INTERVAL, ECG05: 224 MS
PH UR STRIP: 5 [PH] (ref 5–8)
PHOSPHATE SERPL-MCNC: 3.4 MG/DL (ref 2.6–4.7)
PLATELET # BLD AUTO: 218 K/UL (ref 150–400)
POTASSIUM SERPL-SCNC: 4.4 MMOL/L (ref 3.5–5.1)
PROT SERPL-MCNC: 7.3 G/DL (ref 6.4–8.2)
PROT UR STRIP-MCNC: 100 MG/DL
PROTHROMBIN TIME: 9.9 SEC (ref 9–11.1)
Q-T INTERVAL, ECG07: 460 MS
Q-T INTERVAL, ECG07: 504 MS
QRS DURATION, ECG06: 154 MS
QRS DURATION, ECG06: 158 MS
QTC CALCULATION (BEZET), ECG08: 477 MS
QTC CALCULATION (BEZET), ECG08: 565 MS
RBC # BLD AUTO: 4.07 M/UL (ref 3.8–5.2)
RBC #/AREA URNS HPF: ABNORMAL /HPF (ref 0–5)
SERVICE CMNT-IMP: ABNORMAL
SODIUM SERPL-SCNC: 137 MMOL/L (ref 136–145)
SP GR UR REFRACTOMETRY: 1.01 (ref 1–1.03)
THERAPEUTIC RANGE,PTTT: NORMAL SECS (ref 58–77)
TROPONIN I SERPL-MCNC: 0.04 NG/ML
TROPONIN I SERPL-MCNC: <0.04 NG/ML
TROPONIN I SERPL-MCNC: <0.04 NG/ML
TSH SERPL DL<=0.05 MIU/L-ACNC: 3.42 UIU/ML (ref 0.36–3.74)
UA: UC IF INDICATED,UAUC: ABNORMAL
UROBILINOGEN UR QL STRIP.AUTO: 0.2 EU/DL (ref 0.2–1)
VENTRICULAR RATE, ECG03: 54 BPM
VENTRICULAR RATE, ECG03: 91 BPM
WBC # BLD AUTO: 7 K/UL (ref 3.6–11)
WBC URNS QL MICRO: ABNORMAL /HPF (ref 0–4)

## 2017-05-27 PROCEDURE — 84100 ASSAY OF PHOSPHORUS: CPT | Performed by: EMERGENCY MEDICINE

## 2017-05-27 PROCEDURE — 83735 ASSAY OF MAGNESIUM: CPT | Performed by: EMERGENCY MEDICINE

## 2017-05-27 PROCEDURE — 74011000258 HC RX REV CODE- 258: Performed by: HOSPITALIST

## 2017-05-27 PROCEDURE — 74011250637 HC RX REV CODE- 250/637: Performed by: HOSPITALIST

## 2017-05-27 PROCEDURE — 74011636320 HC RX REV CODE- 636/320: Performed by: EMERGENCY MEDICINE

## 2017-05-27 PROCEDURE — 96375 TX/PRO/DX INJ NEW DRUG ADDON: CPT

## 2017-05-27 PROCEDURE — 93005 ELECTROCARDIOGRAM TRACING: CPT

## 2017-05-27 PROCEDURE — 87086 URINE CULTURE/COLONY COUNT: CPT | Performed by: EMERGENCY MEDICINE

## 2017-05-27 PROCEDURE — 82962 GLUCOSE BLOOD TEST: CPT

## 2017-05-27 PROCEDURE — 74011250636 HC RX REV CODE- 250/636: Performed by: HOSPITALIST

## 2017-05-27 PROCEDURE — 74011250637 HC RX REV CODE- 250/637: Performed by: INTERNAL MEDICINE

## 2017-05-27 PROCEDURE — 99285 EMERGENCY DEPT VISIT HI MDM: CPT

## 2017-05-27 PROCEDURE — 74011250636 HC RX REV CODE- 250/636: Performed by: NURSE PRACTITIONER

## 2017-05-27 PROCEDURE — 71020 XR CHEST PA LAT: CPT

## 2017-05-27 PROCEDURE — 85610 PROTHROMBIN TIME: CPT | Performed by: EMERGENCY MEDICINE

## 2017-05-27 PROCEDURE — 74011000258 HC RX REV CODE- 258: Performed by: EMERGENCY MEDICINE

## 2017-05-27 PROCEDURE — 84443 ASSAY THYROID STIM HORMONE: CPT | Performed by: HOSPITALIST

## 2017-05-27 PROCEDURE — 65660000000 HC RM CCU STEPDOWN

## 2017-05-27 PROCEDURE — 36415 COLL VENOUS BLD VENIPUNCTURE: CPT | Performed by: EMERGENCY MEDICINE

## 2017-05-27 PROCEDURE — 80053 COMPREHEN METABOLIC PANEL: CPT | Performed by: EMERGENCY MEDICINE

## 2017-05-27 PROCEDURE — 74011250637 HC RX REV CODE- 250/637: Performed by: EMERGENCY MEDICINE

## 2017-05-27 PROCEDURE — 74011636637 HC RX REV CODE- 636/637: Performed by: HOSPITALIST

## 2017-05-27 PROCEDURE — 94762 N-INVAS EAR/PLS OXIMTRY CONT: CPT

## 2017-05-27 PROCEDURE — 76705 ECHO EXAM OF ABDOMEN: CPT

## 2017-05-27 PROCEDURE — 81001 URINALYSIS AUTO W/SCOPE: CPT | Performed by: EMERGENCY MEDICINE

## 2017-05-27 PROCEDURE — 71260 CT THORAX DX C+: CPT

## 2017-05-27 PROCEDURE — 96361 HYDRATE IV INFUSION ADD-ON: CPT

## 2017-05-27 PROCEDURE — 74011250636 HC RX REV CODE- 250/636: Performed by: EMERGENCY MEDICINE

## 2017-05-27 PROCEDURE — 83036 HEMOGLOBIN GLYCOSYLATED A1C: CPT | Performed by: HOSPITALIST

## 2017-05-27 PROCEDURE — 85730 THROMBOPLASTIN TIME PARTIAL: CPT | Performed by: EMERGENCY MEDICINE

## 2017-05-27 PROCEDURE — 83880 ASSAY OF NATRIURETIC PEPTIDE: CPT | Performed by: EMERGENCY MEDICINE

## 2017-05-27 PROCEDURE — 83690 ASSAY OF LIPASE: CPT | Performed by: EMERGENCY MEDICINE

## 2017-05-27 PROCEDURE — 84484 ASSAY OF TROPONIN QUANT: CPT | Performed by: EMERGENCY MEDICINE

## 2017-05-27 PROCEDURE — 70450 CT HEAD/BRAIN W/O DYE: CPT

## 2017-05-27 PROCEDURE — 85025 COMPLETE CBC W/AUTO DIFF WBC: CPT | Performed by: EMERGENCY MEDICINE

## 2017-05-27 PROCEDURE — 74011000250 HC RX REV CODE- 250: Performed by: EMERGENCY MEDICINE

## 2017-05-27 PROCEDURE — 82550 ASSAY OF CK (CPK): CPT | Performed by: EMERGENCY MEDICINE

## 2017-05-27 PROCEDURE — 82150 ASSAY OF AMYLASE: CPT | Performed by: EMERGENCY MEDICINE

## 2017-05-27 PROCEDURE — 74177 CT ABD & PELVIS W/CONTRAST: CPT

## 2017-05-27 RX ORDER — DEXTROSE 50 % IN WATER (D50W) INTRAVENOUS SYRINGE
12.5-25 AS NEEDED
Status: DISCONTINUED | OUTPATIENT
Start: 2017-05-27 | End: 2017-05-27

## 2017-05-27 RX ORDER — ACETAMINOPHEN 325 MG/1
650 TABLET ORAL
Status: DISCONTINUED | OUTPATIENT
Start: 2017-05-27 | End: 2017-06-01 | Stop reason: HOSPADM

## 2017-05-27 RX ORDER — ERGOCALCIFEROL 1.25 MG/1
50000 CAPSULE ORAL DAILY
Status: DISCONTINUED | OUTPATIENT
Start: 2017-05-28 | End: 2017-05-27

## 2017-05-27 RX ORDER — LOSARTAN POTASSIUM 25 MG/1
25 TABLET ORAL DAILY
Status: DISCONTINUED | OUTPATIENT
Start: 2017-05-27 | End: 2017-05-27

## 2017-05-27 RX ORDER — ONDANSETRON 2 MG/ML
8 INJECTION INTRAMUSCULAR; INTRAVENOUS
Status: COMPLETED | OUTPATIENT
Start: 2017-05-27 | End: 2017-05-27

## 2017-05-27 RX ORDER — CHLORTHALIDONE 25 MG/1
25 TABLET ORAL DAILY
Status: DISCONTINUED | OUTPATIENT
Start: 2017-05-28 | End: 2017-06-01 | Stop reason: HOSPADM

## 2017-05-27 RX ORDER — SODIUM CHLORIDE 0.9 % (FLUSH) 0.9 %
10 SYRINGE (ML) INJECTION
Status: COMPLETED | OUTPATIENT
Start: 2017-05-27 | End: 2017-05-27

## 2017-05-27 RX ORDER — INSULIN GLARGINE 100 [IU]/ML
15 INJECTION, SOLUTION SUBCUTANEOUS
COMMUNITY
End: 2017-09-21

## 2017-05-27 RX ORDER — CLONIDINE HYDROCHLORIDE 0.1 MG/1
0.1 TABLET ORAL 2 TIMES DAILY
Status: DISCONTINUED | OUTPATIENT
Start: 2017-05-27 | End: 2017-05-27

## 2017-05-27 RX ORDER — MAGNESIUM SULFATE 100 %
4 CRYSTALS MISCELLANEOUS AS NEEDED
Status: DISCONTINUED | OUTPATIENT
Start: 2017-05-27 | End: 2017-06-01 | Stop reason: HOSPADM

## 2017-05-27 RX ORDER — FUROSEMIDE 20 MG/1
20 TABLET ORAL DAILY
COMMUNITY
End: 2017-06-01

## 2017-05-27 RX ORDER — SODIUM CHLORIDE 0.9 % (FLUSH) 0.9 %
5-10 SYRINGE (ML) INJECTION AS NEEDED
Status: DISCONTINUED | OUTPATIENT
Start: 2017-05-27 | End: 2017-06-01 | Stop reason: HOSPADM

## 2017-05-27 RX ORDER — FAMOTIDINE 10 MG/ML
20 INJECTION INTRAVENOUS
Status: COMPLETED | OUTPATIENT
Start: 2017-05-27 | End: 2017-05-27

## 2017-05-27 RX ORDER — SODIUM CHLORIDE 9 MG/ML
50 INJECTION, SOLUTION INTRAVENOUS CONTINUOUS
Status: DISCONTINUED | OUTPATIENT
Start: 2017-05-27 | End: 2017-06-01 | Stop reason: HOSPADM

## 2017-05-27 RX ORDER — PANTOPRAZOLE SODIUM 40 MG/1
40 TABLET, DELAYED RELEASE ORAL
Status: DISCONTINUED | OUTPATIENT
Start: 2017-05-27 | End: 2017-05-28

## 2017-05-27 RX ORDER — ATORVASTATIN CALCIUM 20 MG/1
20 TABLET, FILM COATED ORAL DAILY
Status: DISCONTINUED | OUTPATIENT
Start: 2017-05-28 | End: 2017-06-01 | Stop reason: HOSPADM

## 2017-05-27 RX ORDER — INSULIN LISPRO 100 [IU]/ML
10 INJECTION, SOLUTION INTRAVENOUS; SUBCUTANEOUS
Status: DISCONTINUED | OUTPATIENT
Start: 2017-05-27 | End: 2017-06-01 | Stop reason: HOSPADM

## 2017-05-27 RX ORDER — HYDROCODONE BITARTRATE AND ACETAMINOPHEN 5; 325 MG/1; MG/1
1 TABLET ORAL
Status: DISCONTINUED | OUTPATIENT
Start: 2017-05-27 | End: 2017-06-01 | Stop reason: HOSPADM

## 2017-05-27 RX ORDER — THERA TABS 400 MCG
1 TAB ORAL DAILY
COMMUNITY

## 2017-05-27 RX ORDER — INSULIN LISPRO 100 [IU]/ML
INJECTION, SOLUTION INTRAVENOUS; SUBCUTANEOUS
Status: DISCONTINUED | OUTPATIENT
Start: 2017-05-27 | End: 2017-06-01 | Stop reason: HOSPADM

## 2017-05-27 RX ORDER — CHOLESTYRAMINE 4 G/4.8G
4 POWDER, FOR SUSPENSION ORAL
Status: DISCONTINUED | OUTPATIENT
Start: 2017-05-27 | End: 2017-05-29

## 2017-05-27 RX ORDER — ONDANSETRON 2 MG/ML
4 INJECTION INTRAMUSCULAR; INTRAVENOUS
Status: DISCONTINUED | OUTPATIENT
Start: 2017-05-27 | End: 2017-06-01 | Stop reason: HOSPADM

## 2017-05-27 RX ORDER — SODIUM CHLORIDE 0.9 % (FLUSH) 0.9 %
5-10 SYRINGE (ML) INJECTION EVERY 8 HOURS
Status: DISCONTINUED | OUTPATIENT
Start: 2017-05-27 | End: 2017-06-01 | Stop reason: HOSPADM

## 2017-05-27 RX ORDER — LOSARTAN POTASSIUM 50 MG/1
50 TABLET ORAL DAILY
Status: DISCONTINUED | OUTPATIENT
Start: 2017-05-28 | End: 2017-06-01 | Stop reason: HOSPADM

## 2017-05-27 RX ORDER — GLIMEPIRIDE 1 MG/1
1 TABLET ORAL
COMMUNITY
End: 2017-09-21

## 2017-05-27 RX ADMIN — CLONIDINE HYDROCHLORIDE 0.1 MG: 0.1 TABLET ORAL at 11:57

## 2017-05-27 RX ADMIN — Medication 10 ML: at 15:57

## 2017-05-27 RX ADMIN — CLONIDINE HYDROCHLORIDE 0.1 MG: 0.1 TABLET ORAL at 18:43

## 2017-05-27 RX ADMIN — Medication 10 ML: at 22:04

## 2017-05-27 RX ADMIN — ONDANSETRON 4 MG: 2 INJECTION INTRAMUSCULAR; INTRAVENOUS at 23:08

## 2017-05-27 RX ADMIN — PANTOPRAZOLE SODIUM 40 MG: 40 TABLET, DELAYED RELEASE ORAL at 11:57

## 2017-05-27 RX ADMIN — FAMOTIDINE 20 MG: 10 INJECTION, SOLUTION INTRAVENOUS at 07:20

## 2017-05-27 RX ADMIN — ONDANSETRON 8 MG: 2 INJECTION INTRAMUSCULAR; INTRAVENOUS at 07:18

## 2017-05-27 RX ADMIN — SODIUM CHLORIDE 100 ML: 900 INJECTION, SOLUTION INTRAVENOUS at 07:03

## 2017-05-27 RX ADMIN — INSULIN LISPRO 5 UNITS: 100 INJECTION, SOLUTION INTRAVENOUS; SUBCUTANEOUS at 10:58

## 2017-05-27 RX ADMIN — SODIUM CHLORIDE 50 ML/HR: 900 INJECTION, SOLUTION INTRAVENOUS at 10:15

## 2017-05-27 RX ADMIN — INSULIN LISPRO 10 UNITS: 100 INJECTION, SOLUTION INTRAVENOUS; SUBCUTANEOUS at 18:39

## 2017-05-27 RX ADMIN — CHOLESTYRAMINE 4 G: 4 POWDER, FOR SUSPENSION ORAL at 18:46

## 2017-05-27 RX ADMIN — Medication 10 ML: at 07:03

## 2017-05-27 RX ADMIN — INSULIN LISPRO 7 UNITS: 100 INJECTION, SOLUTION INTRAVENOUS; SUBCUTANEOUS at 18:40

## 2017-05-27 RX ADMIN — SODIUM CHLORIDE 50 ML/HR: 900 INJECTION, SOLUTION INTRAVENOUS at 15:57

## 2017-05-27 RX ADMIN — SODIUM CHLORIDE 500 ML: 900 INJECTION, SOLUTION INTRAVENOUS at 08:12

## 2017-05-27 RX ADMIN — LOSARTAN POTASSIUM 25 MG: 25 TABLET ORAL at 18:45

## 2017-05-27 RX ADMIN — CEFTRIAXONE 1 G: 1 INJECTION, POWDER, FOR SOLUTION INTRAMUSCULAR; INTRAVENOUS at 10:15

## 2017-05-27 RX ADMIN — IOPAMIDOL 100 ML: 755 INJECTION, SOLUTION INTRAVENOUS at 07:03

## 2017-05-27 RX ADMIN — LIDOCAINE HYDROCHLORIDE 40 ML: 20 SOLUTION ORAL; TOPICAL at 07:22

## 2017-05-27 NOTE — ROUTINE PROCESS
TRANSFER - OUT REPORT:    Verbal report given to Marian Jmi RN (name) on [facility]  being transferred to NSTU (unit) for routine progression of care       Report consisted of patients Situation, Background, Assessment and   Recommendations(SBAR). Information from the following report(s) SBAR, ED Summary, Intake/Output, MAR, Med Rec Status and Cardiac Rhythm NSR & Sinus prabhu was reviewed with the receiving nurse. Lines:   Peripheral IV 05/27/17 Left Antecubital (Active)   Site Assessment Clean, dry, & intact 5/27/2017  6:50 AM   Phlebitis Assessment 0 5/27/2017  6:50 AM   Infiltration Assessment 0 5/27/2017  6:50 AM   Dressing Status Clean, dry, & intact 5/27/2017  6:50 AM        Opportunity for questions and clarification was provided.       Patient transported with:   O2 @ 1 lpm NC liters

## 2017-05-27 NOTE — H&P
[address] [facility] [place] [address]   HISTORY AND PHYSICAL       Name:  Elvin Monae   MR#:  [id number]   :  1950   Account #:  [de-identified]        Date of Adm:  2017       CHIEF COMPLAINT: Abdominal pain and recurrent episodes of   syncope. SOURCE OF HISTORY: The patient and record review. HISTORY OF PRESENT ILLNESS: This is a 70-year-old female who   came to the hospital via EMS for the above symptoms of shortness of   breath, nausea, and abdominal pain and recurrent syncope and she   had undergone workup including CAT scan of the chest and abdomen   and pelvis in the ED, which are quite unremarkable except small   pulmonary nodule. When I asked her during my interview as to what   brought her in here today she said that I keep passing out and having   this abdominal pain. When she further expanded on her past medical   history, she stated that she has suffered from vertigo her entire life;   however, over the last month, she has blacked out twice, she,   however, wonders if she has anything to do her vision as she is   scheduled to have laser surgery for an eye problem. She did not have   any neurologist workup or have any imaging studies done to the brain. As far as the abdominal pain, she has pain on the right upper quadrant   epigastric area aching and radiating, maybe 4-5/10. associated with   nausea, but no vomiting. She has chronic alternating diarrhea and   constipation. She had undergone multiple colonoscopies she told me   and all were negative and she was prescribed a certain medicine by   her gastroenterologist which was expensive, she was unable to fill it. She would not know the name of the pill. Her CAT scan today is quite   unremarkable. She also mentions shortness of breath, but then she   remarks that this is chronic.  She thinks it is from her way to her I   reviewed her records, she sees Dr. Niall Reed with the [facility]. She was last in the office in January, prior to that in   October, she was evaluated by a cardiologist for shortness of breath,   nothing cardiac was detected and she had a normal echocardiogram.   No fever or chills. No dysuria, urgency or frequency. She has chronic   lymphedema and she has chronic morbid obesity. Her activity is limited   as a result. PAST MEDICAL HISTORY: Significant for right breast cancer status   post surgery, radiation and chemo. She has insulin-dependent   diabetes, hypertension, vertigo, morbid obesity, lymphedema. CURRENT MEDICATIONS: Include   1. Amaryl 1 tablet daily. 2. Lasix 20 mg daily. 3. Lantus 42 units daily. 4. Metformin 500 mg b.i.d.   5. Zofran 4 mg as needed. 6. Lipitor 20 mg nightly. 7. Vitamin D2, 50,000 units monthly. 8. NovoLog 10 units before each meal 3 times daily. ALLERGIES   1. BEE STINGS. 2. HEPARIN ANALOG WITH VERTIGO. SOCIAL HISTORY: She is , lives with her . Never   smoked. Denies alcohol use. FAMILY MEDICAL HISTORY: Unremarkable. REVIEW OF SYSTEMS   Vertigo, nausea, abdominal pain, alternating diarrhea and constipation,   chronic lymphedema, shortness of breath. No dysuria, urgency or   frequency. No fever or chills. The rest of the review of systems is   negative. PHYSICAL EXAMINATION   GENERAL: The patient is seen sitting room by the edge of the bed,   alert, oriented, not in any distress. She has nasal cannula. HEENT: Head is atraumatic. No pallor or tenderness. No sinus   tenderness. Buccal mucosa is moist. No jaundice. LUNGS: Clear air entry bilaterally. CARDIOVASCULAR: No JVD was appreciated. S1, S2 regular. No   gallop or murmur. ABDOMEN: Very obese, normoactive. Slight tenderness, right upper   quadrant. EXTREMITIES: Hyperpigmentation and +3 edema on the legs. No   deformity. CENTRAL NERVOUS SYSTEM: Alert and oriented x4. Cranial nerves   are intact.  Sensation is normal. Motor examination is symmetrical 5/5. CLINICAL DATA: Complete blood count unremarkable. Urine positive   for WBC, leukocyte esterase and INR is 1. Serum chemistry: Blood   sugar up to 97, BUN 29, creatinine 1. 16. , AST 75, alkaline   phosphatase 135. . Troponin and CK-MB normal. Her   cholesterol from 10/2016, she has an LDL of 150 and triglycerides 264   with a total cholesterol of 242. Last A1c in October was 8.7. Urine   cultured. Abdominal and pelvic CT obtained. No acute findings. On the   chest, there is 3.3 nodule in the lingula. Portable chest x-ray showed   mild cardiomegaly. A 12-lead EKG today showed sinus bradycardia at   54. Left axis deviation, left bundle branch block. An EKG from 10/2016   showed right bundle branch block. Echocardiogram from 10/2016,   normal ejection fraction and grossly normal heart valves. ASSESSMENT AND PLAN: This is a 70-year-old female with medical   history of morbid obesity, diabetes, insulin-dependent, hypertension,   right breast cancer, status post mastectomy, radiation and   chemotherapy, chronic dizziness and vertigo, abdominal pain,   presented with episodes of repeat passing out and abdominal pain. 1. Abdominal pain associated with chronic alternating diarrhea and   constipation. She has had GI evaluation with colonoscopy for this in   the past which according to the patient, did not show anything. CAT   scan today of the abdomen and pelvis is unremarkable; however, liver   enzymes are slightly elevated. I would like to rule out and GI   pathology, would like to get ultrasound of the liver and recheck LFTs   tomorrow. If they are still high, may obtain a viral hepatitis   panel, otherwise if everything is negative, the patient will be advised to   follow up with her gastroenterologist when she has established. Give   her empiric acid reflux medications. 2. syncope, recurrent.  She remarks that she had a long-standing   vertigo, but passing out has become frequent recently, especially when   she tries to look down. The patient wonders if it has anything to do with   her vision. She has some vision problems. She is scheduled LASIK   surgery. I would like to obtain a head CT at least, although her history   is chronic, but has gotten worse recently. 3. Left bundle branch block. BNP is slightly elevated. Cardiac enzymes   are negative. No chest pain specifically. Her echocardiogram was   normal about 7 or 8 months ago. Her EKG at that time was right   bundle branch block. Now she is currently with syncopes. We will cycle cardiac enzymes,   repeat echocardiogram and I will ask Cardiology, Dr. Beck [facility] group has   evaluated her in the past.   4. Dehydration. The patient has slightly elevated BUN and creatinine   and also liver enzymes, could be from the diuretics. We will hold Lasix   and administer normal saline at 50 mL per hour for 24 hours, repeat   labs. 5. Insulin-dependent diabetes. The patient is on Lantus at 42 units and   NovoLog 10 units a.c. t.i.d. We will add sliding scale and Accu-Cheks. Carb consistent diet, check A1c, last one was 6 or 7 months ago,   which was 8.7. We will be holding oral hypoglycemics. 6. Hypertension. This seems to be reasonably controlled. Her   medication list listed clonidine, no dosage indicated. We will start low   dose of clonidine 0.1 b.i.d. and titrate dosage according to blood   pressure control. 7. Morbid obesity. The patient is morbidly obese, is diabetic and   hypertensive. She has lymphedema that limits her activities. I have   counseled her on dietary measures and physical activity as much as   she can. 8. Prophylaxis. Acid suppression with Protonix. Venous thrombosis   prophylaxis, SCDs, SHE IS ALLERGIC TO HEPARIN. CODE STATUS: FULL. DISPOSITION: Admit to cardiac monitored unit. CONSULTATIONS: Cardiology, may consider Neurology based on   imaging findings.         Kathie Alva MD      WD / HUSSEIN   D:  05/27/2017   10:16   T:  05/27/2017   11:38   Job #:  [id number]

## 2017-05-27 NOTE — PROGRESS NOTES
TRANSFER - IN REPORT:    Verbal report received from [facility] (name) on Mandy Sosa  being received from ED(unit) for routine progression of care      Report consisted of patients Situation, Background, Assessment and   Recommendations(SBAR). Information from the following report(s) SBAR, Kardex, ED Summary, Intake/Output, MAR, Recent Results and Cardiac Rhythm NSR/sinus prabhu was reviewed with the receiving nurse. Opportunity for questions and clarification was provided. Assessment completed upon patients arrival to unit and care assumed.

## 2017-05-27 NOTE — ED TRIAGE NOTES
Arrived via ems from [place] from home reporting SOB, Nausea, and RUQ abdominal pain starting 0200. Milder forms of these symptoms have been going on for the \"past month. \" Patient reports new rash on chest as well. , 165/63 BP en route. Currently, VS stable but still hypertensive, /64 and bilateral lower extremity edema.

## 2017-05-27 NOTE — ED PROVIDER NOTES
HPI Comments: The patient is a 59-year-old female with a past medical history significant for diabetes, hypertension, hypercholesterolemia, morbid obesity, legally blind, status post cholecystectomy, right sided mastectomy secondary to breast cancer, hysterectomy, thyroid nodules, osteoarthritis, lymphedema of the lower extremity, diverticulitis , who presents to the ED with a complaint of intermittent episodes of dizziness and passing out upon standing and walking for the past month and a half. Tonight, the patient had some epigastric tenderness, and shortness of breath while resting that lasted approximately 10-15 minutes and has improved slightly. The patient also complains of some intermittent episode of chest tightness. She denies any fever, cough, congestion, headache, neck pain, back pain, diarrhea, constipation, dysuria, hematuria, dizziness, weakness and numbness. Patient is a 78 y.o. female presenting with abdominal pain. Abdominal Pain    The pain is associated with vomiting.         Past Medical History:   Diagnosis Date    Cancer [facility])     right breast ca with 12 lymph nodes involved    Diabetes ([facility]     Diverticulitis     Hypercholesterolemia     Hypertension     Lymphedema of both lower extremities     Morbid obesity ([address]     Osteoarthritis 10/13/2010    Retinopathy due to secondary diabetes ([facility]     Thyroid lump     nodule       Past Surgical History:   Procedure Laterality Date    COLONOSCOPY N/A 8/24/2016    COLONOSCOPY performed [facility] Sakshi Toribio MD at [facility] ENDOSCOPY    HX CATARACT REMOVAL      HX CHOLECYSTECTOMY      HX GYN      hysterectomy    HX HYSTERECTOMY      HX MASTECTOMY      HX REFRACTIVE SURGERY      HX RETINAL DETACHMENT REPAIR           Family History:   Problem Relation Age of Onset    Heart Disease Mother     Heart Disease Father        Social History     Social History    Marital status:      Spouse name: N/A    Number of children: N/A    Years of education: N/A     Occupational History    Not on file. Social History Main Topics    Smoking status: Never Smoker    Smokeless tobacco: Never Used    Alcohol use No    Drug use: No    Sexual activity: Yes     Partners: Male     Other Topics Concern    Not on file     Social History Narrative    ** Merged History Encounter **              ALLERGIES: Bee sting [sting, bee]; Heparin analogues; and No known drug allergies    Review of Systems   Gastrointestinal: Positive for abdominal pain. All other systems reviewed and are negative. Vitals:    05/27/17 0641   BP: 176/64   Pulse: (!) 54   Resp: 16   Temp: 98.2 °F (36.8 °C)   SpO2: 96%            Physical Exam   Nursing note and vitals reviewed. CONSTITUTIONAL: Well-appearing; well-nourished; in mild distress  HEAD: Normocephalic; atraumatic  EYES: PERRL; EOM intact; conjunctiva and sclera are clear bilaterally. ENT: No rhinorrhea; normal pharynx with no tonsillar hypertrophy; mucous membranes pink/moist, no erythema, no exudate. NECK: Supple; non-tender; no cervical lymphadenopathy  CARD: Normal S1, S2; no murmurs, rubs, or gallops. Regular rate and rhythm. RESP: Normal respiratory effort; breath sounds clear and equal bilaterally; no wheezes, rhonchi, or rales. ABD: Normal bowel sounds; non-distended; non-tender; no palpable organomegaly, no masses, no bruits. Back Exam: Normal inspection; no vertebral point tenderness, no CVA tenderness. Normal range of motion. EXT: Normal ROM in all four extremities; non-tender to palpation; no swelling or deformity; distal pulses are normal, Trace pedal edema B/L; stasis dematitis  SKIN: Warm; dry; no rash.   NEURO:Alert and oriented x 3, coherent, NBA-XII grossly intact, sensory and motor are non-focal.        MDM  Number of Diagnoses or Management Options  Diagnosis management comments: Assessment: 78 year female, who presents with intermittent episodes of epigastric discomfort, Dizziness and lightheadedness, shortness of breath, and passing out. Differential diagnosis included ACS,VTE, CHF with exacerbation, gastritis, pancreatitis, colitis, constipation , rule out anemia and electrolyte abnormality. Plan: EKG/ chest x-ray/ lab/ IVF/ CT scan abd and pelvis/ serial exam/ antiemetic and analgesia/ Pepcid/ Serial exam/ Monitor and Reevaluate. Amount and/or Complexity of Data Reviewed  Clinical lab tests: ordered and reviewed  Tests in the radiology section of CPT®: ordered and reviewed  Tests in the medicine section of CPT®: reviewed and ordered  Discussion of test results with the performing providers: yes  Decide to obtain previous medical records or to obtain history from someone other than the patient: yes  Obtain history from someone other than the patient: yes  Review and summarize past medical records: yes  Discuss the patient with other providers: yes  Independent visualization of images, tracings, or specimens: yes    Risk of Complications, Morbidity, and/or Mortality  Presenting problems: moderate  Diagnostic procedures: moderate  Management options: moderate      ED Course       Procedures     ED EKG interpretation:  Rhythm: sinus bradycardia; and regular . Rate (approx.): 54; Axis: left axis deviation; P wave: prolonged; QRS interval: normal ; ST/T wave: non-specific changes; in  Lead: Diffusely; 1st degree AV-bloc; LBBB; Other findings: abnormal ekg. This EKG was interpreted by Babatunde Can MD,ED Provider. XRAY INTERPRETATION (ED MD)  Chest Xray  No acute process seen. Mild Cardiomegaly. No bony abnormalities. No infiltrate. Babatunde Can MD 7:21 AM    PROGRESS NOTE:  Pt has been reexamined by Babatunde Can MD all available results have been reviewed with pt and any available family. Pt understands sx, dx, and tx in ED. Care plan has been outlined and questions have been answered.  Pt and any available family understands and agrees to need for admission to hospital for further tx not available in ED. Pt is ready for admission. Written by Ten Abad MD,  8:44 AM    CONSULT NOTE:  Ten Abad MD spoke with Dr. Kilo Stephenson of the adult hospitalist team. Discussed patient's presentation, history, physical assessment, and available diagnostic results.  He will evaluate, write orders and admit the patient to the hospital. 09:10 AM    .

## 2017-05-27 NOTE — IP AVS SNAPSHOT
[address] 
[phone] Patient: Selena Samuel MRN: [id number] JTS:[id number] You are allergic to the following Allergen Reactions Bee Sting (Sting, Bee) Hives Heparin Analogues Vertigo No Known Drug Allergies Other (comments) Recent Documentation Height Weight BMI OB Status Smoking Status 1.778 m 128.5 kg 40.65 kg/m2 Hysterectomy Never Smoker Emergency Contacts Name Discharge Info Relation Home Work Mobile Jaycee Jefferson CAREGIVER [3] Spouse [3] [phone] Noble Riddle DISCHARGE CAREGIVER [3] Child [2] [phone] About your hospitalization You were admitted on:  May 27, 2017 You last received care in the:  [facility] 6S NEURO-SCI TELE You were discharged on:  June 1, 2017 Unit phone number:  [phone] Why you were hospitalized Your primary diagnosis was:  Syncope Your diagnoses also included:  Abdominal Pain, Alternating Constipation And Diarrhea, Lymphedema, Lbbb (Left Bundle Branch Block) Providers Seen During Your Hospitalizations Provider Role Specialty Primary office phone Ramin Can MD Attending Provider Emergency Medicine [phone] Yu Goncalves MD Attending Provider Internal Medicine [phone] Lady Thakkar MD Attending Provider Internal Medicine [phone] Shahida Parson MD Attending Provider Internal Medicine [phone] Your Primary Care Physician (PCP) Primary Care Physician Office Phone Office Fax [address] [phone] Follow-up Information Follow up With Details Comments Contact Info Abrahan Putnam MD On 6/19/2017 @ 215 for pacemaker check and wound check [address] [facility] 
[phone] Lara Schofield MD   [facility] Internal Medicine [address] 
[phone] Rhett Larson MD In 2 weeks for hospital follow up , please call to schedule appointment [address] [facility] 57 
[phone] Your Appointments Monday June 19, 2017  2:15 PM EDT  
PACEMAKER with Carlos Ruffin [facility] ([facility] SCHEDULING) [address] [place]  
[phone] Monday June 19, 2017  2:20 PM EDT  
ESTABLISHED PATIENT with Essence Hayden MD  
[facility] ([facility]) [address] [facility]  
[phone] Current Discharge Medication List  
  
START taking these medications Dose & Instructions Dispensing Information Comments Morning Noon Evening Bedtime  
 cephALEXin 500 mg capsule Commonly known as:  Johnson Sarthak Your last dose was: Your next dose is:    
   
   
 Dose:  500 mg Take 1 Cap by mouth three (3) times daily. Quantity:  15 Cap Refills:  0  
     
   
   
   
  
 chlorthalidone 25 mg tablet Commonly known as:  Shelby Eagle Your last dose was: Your next dose is:    
   
   
 Dose:  25 mg Take 1 Tab by mouth daily. Quantity:  30 Tab Refills:  0  
     
   
   
   
  
 hydrALAZINE 25 mg tablet Commonly known as:  APRESOLINE Your last dose was: Your next dose is:    
   
   
 Dose:  25 mg Take 1 Tab by mouth three (3) times daily for 30 days. Quantity:  90 Tab Refills:  0  
     
   
   
   
  
 losartan 50 mg tablet Commonly known as:  COZAAR Your last dose was: Your next dose is:    
   
   
 Dose:  50 mg Take 1 Tab by mouth daily. Quantity:  30 Tab Refills:  0 CONTINUE these medications which have CHANGED Dose & Instructions Dispensing Information Comments Morning Noon Evening Bedtime  
 glimepiride 1 mg tablet Commonly known as:  AMARYL What changed:  Another medication with the same name was removed. Continue taking this medication, and follow the directions you see here. Your last dose was: Your next dose is:    
   
   
 Dose:  1 mg Take 1 mg by mouth Daily (before breakfast). Patient holds for Constipation Refills:  0  
     
   
   
   
  
 LANTUS 100 unit/mL injection Generic drug:  insulin glargine What changed:  Another medication with the same name was removed. Continue taking this medication, and follow the directions you see here. Your last dose was: Your next dose is:    
   
   
 Dose:  15 Units 15 Units by SubCUTAneous route ACB/HS. Refills:  0 CONTINUE these medications which have NOT CHANGED Dose & Instructions Dispensing Information Comments Morning Noon Evening Bedtime  
 atorvastatin 20 mg tablet Commonly known as:  LIPITOR Your last dose was: Your next dose is:    
   
   
 Dose:  20 mg Take 1 Tab by mouth daily. Quantity:  30 Tab Refills:  0  
     
   
   
   
  
 insulin aspart 100 unit/mL Inpn Commonly known as:  Deysi Pena Your last dose was: Your next dose is:    
   
   
 10 units before meals. Tid. Quantity:  3 mL Refills:  2 LINZESS 145 mcg Cap capsule Generic drug:  linaclotide Your last dose was: Your next dose is:    
   
   
 Dose:  145 mcg Take 145 mcg by mouth Daily (before breakfast). Patient holds for Diarrhea Refills:  0  
     
   
   
   
  
 therapeutic multivitamin tablet Commonly known as:  [facility] Your last dose was: Your next dose is:    
   
   
 Dose:  1 Tab Take 1 Tab by mouth daily. Refills:  0 STOP taking these medications   
 furosemide 20 mg tablet Commonly known as:  LASIX  
   
  
 LASIX 20 mg tablet Generic drug:  furosemide Where to Get Your Medications These [facility], [facility]  [address] [place] [address] Phone:  [phone]  
  cephALEXin 500 mg capsule  
 chlorthalidone 25 mg tablet Information on where to get these meds will be given to you by the nurse or doctor. ! Ask your nurse or doctor about these medications  
  atorvastatin 20 mg tablet  
 hydrALAZINE 25 mg tablet  
 losartan 50 mg tablet Discharge Instructions Discharge Instructions PATIENT ID: Ronald Coffman MRN: [id number] YOB: 1950 DATE OF ADMISSION: 5/27/2017  6:38 AM   
DATE OF DISCHARGE: 6/1/2017 PRIMARY CARE PROVIDER: Neil Grajeda MD  
 
ATTENDING PHYSICIAN: Beni aCstro MD 
DISCHARGING PROVIDER: Ronald Sanchez NP To contact this individual call [phone] and ask the  to page. If unavailable ask to be transferred the Adult Hospitalist Department. DISCHARGE DIAGNOSES Pacemaker lead misplaced Diabetes , uncontrolled Hypertension Constipation/Diarrhea CONSULTATIONS: IP CONSULT TO HOSPITALIST 
IP CONSULT TO CARDIOLOGY PROCEDURES/SURGERIES: * No surgery found * PENDING TEST RESULTS:  
At the time of discharge the following test results are still pending: none FOLLOW UP APPOINTMENTS:  
Follow-up Information Follow up With Details Comments Contact Info Nicole Gutierres MD On 6/19/2017 @ 215 for pacemaker check and wound check [place] [address] [address] [facility] 57 
[phone] Neil Grajeda MD   [facility] Internal Medicine [address] 
[phone] Josiah Limon MD In 2 weeks for hospital follow up , please call to schedule appointment [address] [place] [address] 
[phone] ADDITIONAL CARE RECOMMENDATIONS: 
New medications: hydralazine , cozaar, and chlorthalidone. Stop taking clonidine and Lasix . Please follow up with Dr. Eder Goodwin in two weeks time ( call to schedule)  as well as Dr. Hannah Kramer ( this has been scheduled for you) . PATIENT INSTRUCTIONS POST-PACEMAKER IMPLANT 1. No heavy lifting or exercises with the left arm for 4 weeks. This includes the following: Do not raise arm above the shoulder level to comb hair, pull on clothes, etc... Do not use the affected arm to pull up or push up from a seated or laying 
down position. Do not allow anyone else to pull on the affected arm. PLEASE WEAR YOUR SPORTS BRA AT ALL TIMES EXCEPT WHEN BATHING FOR THE FIRST 30 DAYS AFTER YOUR PACEMAKER IS PLACED. 2. DO NOT SHOWER the FIRST 7 DAYS, you may take a sponge bath provided the site stays clean and DRY. Your incision will have skin glue covering the incision, the skin glue will help to reinforce the incision to prevent it from opening, please DO NOT attempt to peel the glue off of the incision. You do have sutures on the inside of the incision that are not visible. Please DO NOT try to scrub the skin glue off once you are able to take a shower. The skin glue will eventually fall off 3. Do not drive for 3 days 4. Call Dr. Hannah Kramer at [phone] if you experience any of the following symptoms: 1. Redness at the pacemaker site 2. Swelling at or around the pacemaker or in the left arm 3. Pain around the pacemaker 4. Dizziness, lightheadedness, fainting spells 5. Lack of energy 6. Shortness of breath 7. Rapid heart rate 8. Chest or muscle twitches 5. Follow-up with Dr. Hannah Kramer as scheduled Future Appointments Date Time Provider Jeni Vidal 6/19/2017 2:15 PM PACEMAKER3, [address]  
6/19/2017 2:20 PM Rickey Stephen  [address]  
10/18/2017 1:00 PM Yanelis Reeves  [address] 6. You may use pain medication and ice pack for pain relief as needed. You may wear the sling as a reminder to keep your arm below the your shoulder. Rajat Badillo M.D. [facility] Parker Electrophysiology/Cardiology [facility] [facility] [facility], [facility] [address] [place] [address] 
[phone] [id number] ACTIVITY: as above WOUND CARE: as above EQUIPMENT needed: none DISCHARGE MEDICATIONS: 
 See Medication Reconciliation Form · It is important that you take the medication exactly as they are prescribed. · Keep your medication in the bottles provided by the pharmacist and keep a list of the medication names, dosages, and times to be taken in your wallet. · Do not take other medications without consulting your doctor. NOTIFY YOUR PHYSICIAN FOR ANY OF THE FOLLOWING:  
Fever over 101 degrees for 24 hours. Chest pain, shortness of breath, fever, chills, nausea, vomiting, diarrhea, change in mentation, falling, weakness, bleeding. Severe pain or pain not relieved by medications. Or, any other signs or symptoms that you may have questions about. DISPOSITION: 
xx  Home With: 
 OT  PT  [facility]  RN  
  
 SNF/Inpatient Rehab/LTAC Independent/assisted living Hospice Other: CDMP Checked:  
Yes xx PROBLEM LIST Updated: 
Yes xx Signed:  
Susana Sharpe NP 
6/1/2017 
7:41 AM 
 
Losartan (By mouth) Losartan (rafael-CARLINE-tan) Treats high blood pressure. Reduces the risk of stroke in patients with high blood pressure and an enlarged heart. Treats kidney disease in patients with diabetes. This medicine is an angiotensin receptor blocker (ARB). Brand Name(s): Cozaar There may be other brand names for this medicine. When This Medicine Should Not Be Used: This medicine is not right for everyone. Do not use it if you had an allergic reaction to losartan, or if you are pregnant.  Do not use this medicine together with aliskiren if you have diabetes. How to Use This Medicine:  
Tablet · Take your medicine as directed. Your dose may need to be changed several times to find what works best for you. · Drink plenty of fluids if you exercise, sweat more than usual, or have diarrhea or vomiting. · Read and follow the patient instructions that come with this medicine. Talk to your doctor or pharmacist if you have any questions. · Missed dose: Take a dose as soon as you remember. If it is almost time for your next dose, wait until then and take a regular dose. Do not take extra medicine to make up for a missed dose. · Store the medicine in a closed container at room temperature, away from heat, moisture, and direct light. Drugs and Foods to Avoid: Ask your doctor or pharmacist before using any other medicine, including over-the-counter medicines, vitamins, and herbal products. · Some medicines can affect how losartan works. Tell your doctor if you are using any of the following: ¨ Lithium ¨ Rifampin ¨ A diuretic (water pill), such as spironolactone, triamterene, or amiloride ¨ NSAID pain or arthritis medicine, such as aspirin, diclofenac, ibuprofen, or naproxen ¨ Another blood pressure medicine, such as aliskiren, benazepril, enalapril, or lisinopril · Ask your doctor before you use any medicine, supplement, or salt substitute that contains potassium. Warnings While Using This Medicine: · It is not safe to take this medicine during pregnancy. It could harm an unborn baby. Tell your doctor right away if you become pregnant. · Tell your doctor if you are breastfeeding, or if you have kidney disease, liver disease, congestive heart failure, or diabetes. Tell your doctor if you have had angioedema that was caused by a blood pressure medicine. · This medicine could lower your blood pressure too much, especially when you first use it or if you are dehydrated.  Stand or sit up slowly if you feel lightheaded or dizzy. · Your doctor will do lab tests at regular visits to check on the effects of this medicine. Keep all appointments. · Keep all medicine out of the reach of children. Never share your medicine with anyone. Possible Side Effects While Using This Medicine:  
Call your doctor right away if you notice any of these side effects: · Allergic reaction: Itching or hives, swelling in your face or hands, swelling or tingling in your mouth or throat, chest tightness, trouble breathing · Change in how much or how often you urinate · Confusion, weakness, uneven heartbeat, trouble breathing, numbness or tingling in your hands, feet, or lips · Lightheadedness, dizziness, fainting · Rapid weight gain, swelling in your hands, ankles, or feet If you notice these less serious side effects, talk with your doctor: · Diarrhea · Tiredness If you notice other side effects that you think are caused by this medicine, tell your doctor. Call your doctor for medical advice about side effects. You may report side effects to FDA at 8-653-FDA-7020 © 2017 [facility] Information is for End User's use only and may not be sold, redistributed or otherwise used for commercial purposes. The above information is an  only. It is not intended as medical advice for individual conditions or treatments. Talk to your doctor, nurse or pharmacist before following any medical regimen to see if it is safe and effective for you. Hydralazine (By mouth) Hydralazine Hydrochloride (fjp-ZDAK-i-zeen jillian-droe-KLOR-caryl) Treats high blood pressure. Brand Name(s):  
There may be other brand names for this medicine. When This Medicine Should Not Be Used: This medicine is not right for everyone. Do not use it if you had an allergic reaction to hydralazine, or if you have coronary artery disease or rheumatic heart disease. How to Use This Medicine:  
Tablet · Take your medicine as directed. Your dose may need to be changed several times to find what works best for you. · Missed dose: Take a dose as soon as you remember. If it is almost time for your next dose, wait until then and take a regular dose. Do not take extra medicine to make up for a missed dose. · Store the medicine in a closed container at room temperature, away from heat, moisture, and direct light. Drugs and Foods to Avoid: Ask your doctor or pharmacist before using any other medicine, including over-the-counter medicines, vitamins, and herbal products. · Some foods and medicines can affect how hydralazine works. Tell your doctor if you are using diazoxide or an MAO inhibitor. Warnings While Using This Medicine: · Tell your doctor if you are pregnant or breastfeeding, or if you have kidney disease, heart or blood vessel disease, heart rhythm problems, lupus, or if you had a heart attack or stroke. · This medicine may cause the following problems: 
¨ Lupus-like syndrome ¨ Changes in heart rhythm ¨ Nerve problems · This medicine may lower your blood pressure too much and cause you to feel dizzy. Do not drive or do anything else that could be dangerous until you know how this medicine affects you. · Your doctor will do lab tests at regular visits to check on the effects of this medicine. Keep all appointments. · Keep all medicine out of the reach of children. Never share your medicine with anyone. Possible Side Effects While Using This Medicine:  
Call your doctor right away if you notice any of these side effects: · Allergic reaction: Itching or hives, swelling in your face or hands, swelling or tingling in your mouth or throat, chest tightness, trouble breathing · Change in how much or how often you urinate · Chest pain that may spread to your arms, jaw, back, or neck, trouble breathing, unusual sweating, faintness · Fast, pounding, or uneven heartbeat · Fever, chills, cough, sore throat, and body aches · Lightheadedness, dizziness, or fainting · Numbness, tingling, or burning pain in your hands, arms, legs, or feet · Unusual bleeding, bruising, or weakness If you notice these less serious side effects, talk with your doctor: · Diarrhea, nausea, vomiting, loss of appetite · Headache · Stuffy nose or watery eyes If you notice other side effects that you think are caused by this medicine, tell your doctor. Call your doctor for medical advice about side effects. You may report side effects to FDA at 9-549-HWI-6563 © 2017 [facility] Information is for End User's use only and may not be sold, redistributed or otherwise used for commercial purposes. The above information is an  only. It is not intended as medical advice for individual conditions or treatments. Talk to your doctor, nurse or pharmacist before following any medical regimen to see if it is safe and effective for you. Discharge Orders None ACO Transitions of Care Introducing [employer] [address] Tiana Santaan offers a voluntary care coordination program to provide high quality service and care to [facility] fee-for-service beneficiaries. Marcie Araujo was designed to help you enhance your health and well-being through the following services: ? Transitions of Care  support for individuals who are transitioning from one care setting to another (example: Hospital to home). ? Chronic and Complex Care Coordination  support for individuals and caregivers of those with serious or chronic illnesses or with more than one chronic (ongoing) condition and those who take a number of different medications. If you meet specific medical criteria, a [facility] [address] may call you directly to coordinate your care with your primary care physician and your other care providers. For questions about the [facility] programs, please, contact your physicians office. For general questions or additional information about Accountable Care Organizations: 
Please visit www.medicare.gov/acos. html or call 1-800-MEDICARE (8-282.963.7752) [employer] users should call 2-116.697.1819. Introducing [facility] & HEALTH SERVICES! Dear Christelle Viveros: Thank you for requesting a [employer] account. Our records indicate that you already have an active [employer] account. You can access your account anytime at [email or web address] Did you know that you can access your hospital and ER discharge instructions at any time in [employer]? You can also review all of your test results from your hospital stay or ER visit. Additional Information If you have questions, please visit the Frequently Asked Questions section of the [employer] website at [email or web address] Remember, [employer] is NOT to be used for urgent needs. For medical emergencies, dial 911. Now available from your iPhone and Android! General Information Please provide this summary of care documentation to your next provider. Patient Signature:  ____________________________________________________________ Date:  ____________________________________________________________  
  
Singh Cons Provider Signature:  ____________________________________________________________ Date:  ____________________________________________________________

## 2017-05-27 NOTE — PROGRESS NOTES
Bedside shift change report given to MARKO Avila (oncoming nurse) by Dahiana Zuluaga (offgoing nurse). Report included the following information SBAR, Kardex, ED Summary, Intake/Output, MAR, Recent Results and Cardiac Rhythm NSR;LBBB.

## 2017-05-27 NOTE — PROGRESS NOTES
Admission Medication Reconciliation:    Information obtained from:  Patient/RxQuery    Comments/Recommendations: Updated PTA meds/reviewed patient's allergies. 1)  No longer taking Lipitor (2/2 nausea, dizziness), metformin (nausea), ergocalciferol (patient replaced with MVI)    2)  Insulin regimen:  Lantus 15 units BID, aspart 10 units AC meals. Patient occasionally titrates Lantus 2/2 food intake. She also takes glimepiride however holds for constipation    3)  Patient takes Lasix and Linzess however holds both for diarrhea    4)  Per patient she had orders for Questran-Lite and Zofran ODT however never started medications. 5)  Patient reports allergy to base soln of Heparin, not heparin itself. Significant PMH/Disease States:   Past Medical History:   Diagnosis Date    Cancer [facility])     right breast ca with 12 lymph nodes involved    [facility]     Diverticulitis     Hypercholesterolemia     Hypertension     LBBB (left bundle branch block) 5/27/2017    Lymphedema of both lower extremities     [facility]bid [facility]     Osteoarthritis 10/13/2010    Retinopathy due to secondary diabetes ([facility]     Thyroid lump     nodule     Chief Complaint for this Admission:    Chief Complaint   Patient presents with    Abdominal Pain     Allergies:  Bee sting [sting, bee]; Heparin analogues; and No known drug allergies    Prior to Admission Medications:   Prior to Admission Medications   Prescriptions Last Dose Informant Patient Reported? Taking?   furosemide (LASIX) 20 mg tablet   Yes Yes   Sig: Take 20 mg by mouth daily. Patient holds for diarrhea   glimepiride (AMARYL) 1 mg tablet   Yes Yes   Sig: Take 1 mg by mouth Daily (before breakfast). Patient holds for Constipation   insulin aspart (NOVOLOG) 100 unit/mL inpn 5/26/2017 at Unknown time  No Yes   Sig: 10 units before meals.  Tid.   insulin glargine (LANTUS) 100 unit/mL injection 5/26/2017 at HS  Yes Yes   Sig: 15 Units by SubCUTAneous route ACB/HS. linaclotide (LINZESS) 145 mcg cap capsule   Yes Yes   Sig: Take 145 mcg by mouth Daily (before breakfast). Patient holds for Diarrhea   therapeutic multivitamin (THERAGRAN) tablet 5/26/2017 at Unknown time  Yes Yes   Sig: Take 1 Tab by mouth daily.       Facility-Administered Medications: None

## 2017-05-27 NOTE — ED NOTES
Pt resting comfortably on stretcher. Denies needs at this time. Call light within reach, patient instructed on use.

## 2017-05-27 NOTE — ED NOTES
Gave bedside report regarding, SBAR, MAR, and plan of care to Alyssa Uribe RN. Transfered care of patient to RN.

## 2017-05-27 NOTE — IP AVS SNAPSHOT
Current Discharge Medication List  
  
START taking these medications Dose & Instructions Dispensing Information Comments Morning Noon Evening Bedtime  
 cephALEXin 500 mg capsule Commonly known as:  Nerissa Martinez Your last dose was: Your next dose is:    
   
   
 Dose:  500 mg Take 1 Cap by mouth three (3) times daily. Quantity:  15 Cap Refills:  0  
     
   
   
   
  
 chlorthalidone 25 mg tablet Commonly known as:  Mayra Hollow Your last dose was: Your next dose is:    
   
   
 Dose:  25 mg Take 1 Tab by mouth daily. Quantity:  30 Tab Refills:  0  
     
   
   
   
  
 hydrALAZINE 25 mg tablet Commonly known as:  APRESOLINE Your last dose was: Your next dose is:    
   
   
 Dose:  25 mg Take 1 Tab by mouth three (3) times daily for 30 days. Quantity:  90 Tab Refills:  0  
     
   
   
   
  
 losartan 50 mg tablet Commonly known as:  COZAAR Your last dose was: Your next dose is:    
   
   
 Dose:  50 mg Take 1 Tab by mouth daily. Quantity:  30 Tab Refills:  0 CONTINUE these medications which have CHANGED Dose & Instructions Dispensing Information Comments Morning Noon Evening Bedtime  
 glimepiride 1 mg tablet Commonly known as:  AMARYL What changed:  Another medication with the same name was removed. Continue taking this medication, and follow the directions you see here. Your last dose was: Your next dose is:    
   
   
 Dose:  1 mg Take 1 mg by mouth Daily (before breakfast). Patient holds for Constipation Refills:  0  
     
   
   
   
  
 LANTUS 100 unit/mL injection Generic drug:  insulin glargine What changed:  Another medication with the same name was removed. Continue taking this medication, and follow the directions you see here. Your last dose was: Your next dose is:    
   
   
 Dose:  15 Units 15 Units by SubCUTAneous route ACB/HS. Refills:  0 CONTINUE these medications which have NOT CHANGED Dose & Instructions Dispensing Information Comments Morning Noon Evening Bedtime  
 atorvastatin 20 mg tablet Commonly known as:  LIPITOR Your last dose was: Your next dose is:    
   
   
 Dose:  20 mg Take 1 Tab by mouth daily. Quantity:  30 Tab Refills:  0  
     
   
   
   
  
 insulin aspart 100 unit/mL Inpn Commonly known as:  Jodie Ang Your last dose was: Your next dose is:    
   
   
 10 units before meals. Tid. Quantity:  3 mL Refills:  2 LINZESS 145 mcg Cap capsule Generic drug:  linaclotide Your last dose was: Your next dose is:    
   
   
 Dose:  145 mcg Take 145 mcg by mouth Daily (before breakfast). Patient holds for Diarrhea Refills:  0  
     
   
   
   
  
 therapeutic multivitamin tablet Commonly known as:  [facility] Your last dose was: Your next dose is:    
   
   
 Dose:  1 Tab Take 1 Tab by mouth daily. Refills:  0 STOP taking these medications   
 furosemide 20 mg tablet Commonly known as:  LASIX  
   
  
 LASIX 20 mg tablet Generic drug:  furosemide Where to Get Your Medications These medications were sent to [facility], [address] [address] Phone:  [phone]  
  cephALEXin 500 mg capsule  
 chlorthalidone 25 mg tablet Information on where to get these meds will be given to you by the nurse or doctor. ! Ask your nurse or doctor about these medications  
  atorvastatin 20 mg tablet  
 hydrALAZINE 25 mg tablet  
 losartan 50 mg tablet

## 2017-05-28 LAB
ANION GAP BLD CALC-SCNC: 8 MMOL/L (ref 5–15)
BUN SERPL-MCNC: 29 MG/DL (ref 6–20)
BUN/CREAT SERPL: 24 (ref 12–20)
CALCIUM SERPL-MCNC: 8.7 MG/DL (ref 8.5–10.1)
CHLORIDE SERPL-SCNC: 105 MMOL/L (ref 97–108)
CO2 SERPL-SCNC: 24 MMOL/L (ref 21–32)
CREAT SERPL-MCNC: 1.22 MG/DL (ref 0.55–1.02)
GLUCOSE BLD STRIP.AUTO-MCNC: 177 MG/DL (ref 65–100)
GLUCOSE BLD STRIP.AUTO-MCNC: 202 MG/DL (ref 65–100)
GLUCOSE BLD STRIP.AUTO-MCNC: 209 MG/DL (ref 65–100)
GLUCOSE BLD STRIP.AUTO-MCNC: 258 MG/DL (ref 65–100)
GLUCOSE SERPL-MCNC: 167 MG/DL (ref 65–100)
POTASSIUM SERPL-SCNC: 4.2 MMOL/L (ref 3.5–5.1)
SERVICE CMNT-IMP: ABNORMAL
SODIUM SERPL-SCNC: 137 MMOL/L (ref 136–145)

## 2017-05-28 PROCEDURE — 80048 BASIC METABOLIC PNL TOTAL CA: CPT | Performed by: HOSPITALIST

## 2017-05-28 PROCEDURE — 65660000000 HC RM CCU STEPDOWN

## 2017-05-28 PROCEDURE — 74011250637 HC RX REV CODE- 250/637: Performed by: HOSPITALIST

## 2017-05-28 PROCEDURE — 74011636637 HC RX REV CODE- 636/637: Performed by: HOSPITALIST

## 2017-05-28 PROCEDURE — 74011250636 HC RX REV CODE- 250/636: Performed by: NURSE PRACTITIONER

## 2017-05-28 PROCEDURE — 74011250637 HC RX REV CODE- 250/637: Performed by: INTERNAL MEDICINE

## 2017-05-28 PROCEDURE — 74011250636 HC RX REV CODE- 250/636: Performed by: HOSPITALIST

## 2017-05-28 PROCEDURE — 82962 GLUCOSE BLOOD TEST: CPT

## 2017-05-28 PROCEDURE — 74011000258 HC RX REV CODE- 258: Performed by: HOSPITALIST

## 2017-05-28 PROCEDURE — 36415 COLL VENOUS BLD VENIPUNCTURE: CPT | Performed by: HOSPITALIST

## 2017-05-28 PROCEDURE — 93306 TTE W/DOPPLER COMPLETE: CPT

## 2017-05-28 RX ORDER — INSULIN GLARGINE 100 [IU]/ML
15 INJECTION, SOLUTION SUBCUTANEOUS
Status: DISCONTINUED | OUTPATIENT
Start: 2017-05-28 | End: 2017-06-01 | Stop reason: HOSPADM

## 2017-05-28 RX ADMIN — INSULIN LISPRO 10 UNITS: 100 INJECTION, SOLUTION INTRAVENOUS; SUBCUTANEOUS at 17:42

## 2017-05-28 RX ADMIN — INSULIN LISPRO 3 UNITS: 100 INJECTION, SOLUTION INTRAVENOUS; SUBCUTANEOUS at 08:26

## 2017-05-28 RX ADMIN — PANTOPRAZOLE SODIUM 40 MG: 40 TABLET, DELAYED RELEASE ORAL at 06:55

## 2017-05-28 RX ADMIN — CHOLESTYRAMINE 4 G: 4 POWDER, FOR SUSPENSION ORAL at 17:42

## 2017-05-28 RX ADMIN — CHOLESTYRAMINE 4 G: 4 POWDER, FOR SUSPENSION ORAL at 12:05

## 2017-05-28 RX ADMIN — ONDANSETRON 4 MG: 2 INJECTION INTRAMUSCULAR; INTRAVENOUS at 19:10

## 2017-05-28 RX ADMIN — CHLORTHALIDONE 25 MG: 25 TABLET ORAL at 08:02

## 2017-05-28 RX ADMIN — INSULIN GLARGINE 15 UNITS: 100 INJECTION, SOLUTION SUBCUTANEOUS at 22:14

## 2017-05-28 RX ADMIN — Medication 10 ML: at 22:15

## 2017-05-28 RX ADMIN — CEFTRIAXONE 1 G: 1 INJECTION, POWDER, FOR SOLUTION INTRAMUSCULAR; INTRAVENOUS at 10:57

## 2017-05-28 RX ADMIN — CHOLESTYRAMINE 4 G: 4 POWDER, FOR SUSPENSION ORAL at 08:02

## 2017-05-28 RX ADMIN — INSULIN LISPRO 2 UNITS: 100 INJECTION, SOLUTION INTRAVENOUS; SUBCUTANEOUS at 17:41

## 2017-05-28 RX ADMIN — INSULIN LISPRO 10 UNITS: 100 INJECTION, SOLUTION INTRAVENOUS; SUBCUTANEOUS at 12:05

## 2017-05-28 RX ADMIN — INSULIN LISPRO 5 UNITS: 100 INJECTION, SOLUTION INTRAVENOUS; SUBCUTANEOUS at 12:04

## 2017-05-28 RX ADMIN — INSULIN LISPRO 2 UNITS: 100 INJECTION, SOLUTION INTRAVENOUS; SUBCUTANEOUS at 22:14

## 2017-05-28 RX ADMIN — Medication 10 ML: at 06:55

## 2017-05-28 RX ADMIN — ONDANSETRON 4 MG: 2 INJECTION INTRAMUSCULAR; INTRAVENOUS at 22:16

## 2017-05-28 RX ADMIN — LOSARTAN POTASSIUM 50 MG: 50 TABLET ORAL at 08:03

## 2017-05-28 RX ADMIN — INSULIN LISPRO 10 UNITS: 100 INJECTION, SOLUTION INTRAVENOUS; SUBCUTANEOUS at 08:25

## 2017-05-28 RX ADMIN — ONDANSETRON 4 MG: 2 INJECTION INTRAMUSCULAR; INTRAVENOUS at 11:00

## 2017-05-28 RX ADMIN — Medication 10 ML: at 14:48

## 2017-05-28 RX ADMIN — SODIUM CHLORIDE 50 ML/HR: 900 INJECTION, SOLUTION INTRAVENOUS at 14:51

## 2017-05-28 NOTE — PROGRESS NOTES
[facility] [address]: [place]  [phone]        History of Present Illness:   Ms. Darling Rodriguez is a 78year-old with type 2 diabetes, essential hypertension, seen by Dr. Emilie Epstein last year for dyspnea. She attributes her continues dyspnea to her weight. No stress testing in over 5 years, and dm remains uncontrolled. Denies history of irregular heartbeat. Palpitations, bradycardia. On the monitor has LBBB and sinus prabhu rates 46-48 with frequent ectopy and runs of SVT. Concerning for SSS, watching on tele this pm and overnight. She is tired, worse in the last few weeks to months. Has chronic LE edema, bilateral, not much different from baseline. Chronic dizziness, may be worse with exertion. Discussed SS, need for EP evaluation and pacemaker consideration for symptomatic bradycardia- Dr. Edita Atkins to see tomorrow for the group. .    Soc hx. No tobacco.  Fam hx. Dad 59 yo CAD. Assessment and Plan:   1. Shortness of breath and lower extremity edema. Repeat echo pending. Consider stress testing for CAD as well. .    2. Essential hypertension. Blood pressure is elevated here at 156/100. On clonidine, previously on lisinopril, will start ARB. Stop clonidine due to prabhu. 3. Dizziness. Unclear etiology. Consider bradycardia as a cause, watch on tele tonight    4. Lymphedema. [facility] by [facility]. She has been doing wraps and elevating her legs and recommended she avoid salt. 5. Bradycardia- ep eval  6. DM cont insulin, DTC, diet control, increase exercise      She  has a past medical history of Cancer ([address] Diabetes ([address] Diverticulitis; Hypercholesterolemia; Hypertension; LBBB (left bundle branch block) (5/27/2017); Lymphedema of both lower extremities; Morbid obesity ([address] Osteoarthritis (10/13/2010); Retinopathy due to secondary diabetes ([address] and Thyroid lump. She also has no past medical history of Anemia NEC; Asthma; Autoimmune disease ([address] CAD (coronary artery disease); Calculus of kidney;  Cancer ([facility] Chronic kidney disease; Congestive heart failure, unspecified; COPD; Depression; Dyspepsia and other specified disorders of function of stomach; GERD (gastroesophageal reflux disease); Headache; Liver disease; PUD (peptic ulcer disease); Sleep apnea; Stool color black; or Thromboembolus ([facility] All other systems negative except as above. PE  Vitals:    05/27/17 1400 05/27/17 1438 05/27/17 1843 05/27/17 1900   BP: 179/87 183/86 163/77 163/77   Pulse: 86 85 89 89   Resp: 19 20  15   Temp:  98 °F (36.7 °C)  98.2 °F (36.8 °C)   SpO2: 99% 98%  99%   Weight:  260 lb 1.6 oz (118 kg)      Body mass index is 37.32 kg/(m^2).    General appearance - alert, well appearing, and in no distress obese  Mental status - affect appropriate to mood  Eyes - sclera anicteric, moist mucous membranes  Neck - supple, no significant adenopathy  Lymphatics - no  lymphadenopathy  Chest - clear to auscultation, no wheezes, rales or rhonchi  Heart - normal rate, regular rhythm, normal S1, S2, no murmurs, rubs, clicks or gallops  Abdomen - obese, soft, nontender, nondistended, no masses or organomegaly  Back exam - full range of motion, no tenderness  Neurological - cranial nerves II through XII grossly intact, no focal deficit  Musculoskeletal - no muscular tenderness noted, normal strength  Extremities - peripheral pulses normal, 2+pedal edema ble  Skin - normal coloration  +venous stasis    Recent Labs:  Lab Results   Component Value Date/Time    Cholesterol, total 242 10/19/2016 11:49 AM    HDL Cholesterol 39 10/19/2016 11:49 AM    LDL, calculated 150 10/19/2016 11:49 AM    Triglyceride 264 10/19/2016 11:49 AM     Lab Results   Component Value Date/Time    Creatinine 1.16 05/27/2017 06:48 AM     Lab Results   Component Value Date/Time    BUN 29 05/27/2017 06:48 AM     Lab Results   Component Value Date/Time    Potassium 4.4 05/27/2017 06:48 AM     Lab Results   Component Value Date/Time    Hemoglobin A1c 8.9 05/27/2017 10:11 AM Lab Results   Component Value Date/Time    HGB 13.0 05/27/2017 06:48 AM     Lab Results   Component Value Date/Time    PLATELET 640 33/28/5961 06:48 AM       Reviewed:  Past Medical History:   Diagnosis Date    Cancer ([facility]     right breast ca with 12 lymph nodes involved    Diabetes ([facility]     Diverticulitis     Hypercholesterolemia     Hypertension     LBBB (left bundle branch block) 5/27/2017    Lymphedema of both lower extremities     Morbid obesity ([facility]     Osteoarthritis 10/13/2010    Retinopathy due to secondary diabetes ([facility]     Thyroid lump     nodule     History   Smoking Status    Never Smoker   Smokeless Tobacco    Never Used     History   Alcohol Use No     Allergies   Allergen Reactions    Bee Sting [Sting, Bee] Hives    Heparin Analogues Vertigo    No Known Drug Allergies Other (comments)       Current Facility-Administered Medications   Medication Dose Route Frequency    [START ON 5/28/2017] atorvastatin (LIPITOR) tablet 20 mg  20 mg Oral DAILY    cholestyramine light (QUESTRAN LITE) packet 4 g  4 g Oral TID WITH MEALS    insulin lispro (HUMALOG) injection 10 Units  10 Units SubCUTAneous TIDAC    sodium chloride (NS) flush 5-10 mL  5-10 mL IntraVENous Q8H    sodium chloride (NS) flush 5-10 mL  5-10 mL IntraVENous PRN    acetaminophen (TYLENOL) tablet 650 mg  650 mg Oral Q4H PRN    HYDROcodone-acetaminophen (NORCO) 5-325 mg per tablet 1 Tab  1 Tab Oral Q4H PRN    insulin lispro (HUMALOG) injection   SubCUTAneous AC&HS    glucose chewable tablet 16 g  4 Tab Oral PRN    glucagon (GLUCAGEN) injection 1 mg  1 mg IntraMUSCular PRN    0.9% sodium chloride infusion  50 mL/hr IntraVENous CONTINUOUS    dextrose 10 % infusion 125-250 mL  125-250 mL IntraVENous PRN    cefTRIAXone (ROCEPHIN) 1 g in 0.9% sodium chloride (MBP/ADV) 50 mL  1 g IntraVENous Q24H    cloNIDine HCl (CATAPRES) tablet 0.1 mg  0.1 mg Oral BID    pantoprazole (PROTONIX) tablet 40 mg  40 mg Oral ACB    losartan (COZAAR) tablet 25 mg  25 mg Oral DAILY       MD Jaylyn Dodd [facility]  [address] [facility]  [address]

## 2017-05-28 NOTE — PROGRESS NOTES
Clinical Pharmacy Note: Stress Ulcer Prophylaxis Protocol (Non-ICU patients)    Please note that stress ulcer prophylaxis is not indicated for patients outside of the ICU. Ysabel Correa has an order for pantoprazole that has been ordered with an indication of stress ulcer prophylaxis.   No other indication for pantoprazole has been noted in the patients chart and therefore it has been discontinued per the [facility] Stress Ulcer Prophylaxis Protocol for eligible patients. Please call with any questions.

## 2017-05-28 NOTE — PROGRESS NOTES
Hospitalist Progress Note  Sonja Hernandez MD  Office: [phone]        Date of Service:  2017  NAME:  Satnam Wadsworth  :  1950  MRN:  [id number]      Admission Summary: This is a 78year old female who came to the hospital for evaluation of multiple symptoms which included dizziness, syncope, abdominal pain, nausea,sob. Most of these symptoms are chronic per patient report but worse now. Past Medical History:   Diagnosis Date    Cancer [facility])     right breast ca with 12 lymph nodes involved    [facility]     Diverticulitis     Hypercholesterolemia     Hypertension     LBBB (left bundle branch block) 2017    Lymphedema of both lower extremities     Morbid [facility]     Osteoarthritis 10/13/2010    [facility] to [facility]     Thyroid lump     nodule       Interval history / Subjective:   Up in chair. No chest pain. We discussed about pacemaker placement Tuesday. Assessment & Plan:     Syncope, recurrent.   -She had a long-standing vertigo, but passing out has become frequent recently  -She was found to be bradycardiac on monitor  -Cardiology consulted,recommended PPM placement Tuesday if bradycardia persistent. Discussed with Dr Lorena Santos      Left bundle branch block,condution disease. BNP is slightly elevated. Cardiac enzymes   are negative.   -Echocardiogram with normal EF,moderate TR and MR  -Plan for PPM per cardiology     Abdominal pain associated with chronic alternating diarrhea and   Constipation.  - She has had GI evaluation with colonoscopy for this in  the past which according to the patient, did not show anything. -CAT scan tof the abdomen and pelvis is unremarkable; however, liver   enzymes are slightly elevated  - Liver US,probable hepatic steatosis  -Recheck LFTs in AM      Dehydration.  The patient has slightly elevated BUN and creatinine   and also liver enzymes, could be from the diuretics. We will hold Lasix   and administer normal saline at 50 mL per hour for 24 hours, repeat   labs. Insulin-dependent diabetes. -Humalog and Lantus. Accucheks. Humalog SS    Hypertension.  -Clonidine stopped per cards, started on cozaar. Extreme obesity. The patient is morbidly obese, is diabetic and   hypertensive. She has lymphedema that limits her activities. I have   counseled her on dietary measures and physical activity as much as   she can. Code status: full  DVT prophylaxis: scd    Care Plan discussed with: Patient/Family and Nurse  Disposition: Home w/Family and TBD     Hospital Problems  Date Reviewed: 1/20/2017          Codes Class Noted POA    * (Principal)Syncope ICD-10-CM: R55  ICD-9-CM: 780.2  5/27/2017 Unknown        Abdominal pain ICD-10-CM: R10.9  ICD-9-CM: 789.00  5/27/2017 Unknown        Alternating constipation and diarrhea ICD-10-CM: R19.8  ICD-9-CM: 787.99  5/27/2017 Unknown        Lymphedema ICD-10-CM: I89.0  ICD-9-CM: 457.1  5/27/2017 Unknown        LBBB (left bundle branch block) ICD-10-CM: I44.7  ICD-9-CM: 426.3  5/27/2017 Unknown                Review of Systems:   Pertinent items are noted in HPI. Vital Signs:    Last 24hrs VS reviewed since prior progress note. Most recent are:  Visit Vitals    /59    Pulse 70    Temp 98.9 °F (37.2 °C)    Resp 17    Wt 126.1 kg (277 lb 14.4 oz)    SpO2 96%    BMI 39.87 kg/m2         Intake/Output Summary (Last 24 hours) at 05/28/17 0815  Last data filed at 05/28/17 0700   Gross per 24 hour   Intake           740.83 ml   Output              600 ml   Net           140.83 ml        Physical Examination:             Constitutional:  No acute distress, cooperative, pleasant    ENT:  Oral mucous moist, oropharynx benign. Neck supple,    Resp:  CTA bilaterally. No wheezing/rhonchi/rales. No accessory muscle use   CV:  Regular rhythm, normal rate, no murmurs, gallops, rubs    GI:  Soft,obese, non tender.  normoactive bowel sounds, no hepatosplenomegaly     Musculoskeletal: Non pitting edema. Neurologic:  Moves all extremities. AAOx3, CN II-XII reviewed     Psych:  Good insight, Not anxious nor agitated. Data Review:    Review and/or order of clinical lab test  Review and/or order of tests in the radiology section of CPT  Review and/or order of tests in the medicine section of [facility]      Labs:     Recent Labs      05/27/17   0648   WBC  7.0   HGB  13.0   HCT  39.1   PLT  218     Recent Labs      05/28/17   0233  05/27/17   0648   NA  137  137   K  4.2  4.4   CL  105  102   CO2  24  25   BUN  29*  29*   CREA  1.22*  1.16*   GLU  167*  297*   CA  8.7  9.3   MG   --   1.8   PHOS   --   3.4     Recent Labs      05/27/17   0648   SGOT  75*   ALT  103*   AP  135*   TBILI  0.4   TP  7.3   ALB  3.0*   GLOB  4.3*   AML  23*   LPSE  93     Recent Labs      05/27/17   0648   INR  1.0   PTP  9.9   APTT  25.4      No results for input(s): FE, TIBC, PSAT, FERR in the last 72 hours. No results found for: FOL, RBCF   No results for input(s): PH, PCO2, PO2 in the last 72 hours.   Recent Labs      05/27/17   1954  05/27/17   1203  05/27/17   0648   CPK  101  75  74   CKNDX  2.8*  2.8*  2.6*   TROIQ  0.04  <0.04  <0.04     Lab Results   Component Value Date/Time    Cholesterol, total 242 10/19/2016 11:49 AM    HDL Cholesterol 39 10/19/2016 11:49 AM    LDL, calculated 150 10/19/2016 11:49 AM    Triglyceride 264 10/19/2016 11:49 AM     Lab Results   Component Value Date/Time    Glucose (POC) 188 05/27/2017 11:13 PM    Glucose (POC) 379 05/27/2017 06:20 PM    Glucose (POC) 273 05/27/2017 10:54 AM    Glucose (POC) 148 08/24/2016 02:11 PM     Lab Results   Component Value Date/Time    Color YELLOW/STRAW 05/27/2017 08:45 AM    Appearance CLEAR 05/27/2017 08:45 AM    Specific gravity 1.015 05/27/2017 08:45 AM    Specific gravity 1.014 01/13/2017 01:33 PM    pH (UA) 5.0 05/27/2017 08:45 AM    Protein 100 05/27/2017 08:45 AM    Glucose 500 05/27/2017 08:45 AM    Ketone NEGATIVE  05/27/2017 08:45 AM    Bilirubin NEGATIVE  05/27/2017 08:45 AM    Urobilinogen 0.2 05/27/2017 08:45 AM    Nitrites NEGATIVE  05/27/2017 08:45 AM    Leukocyte Esterase TRACE 05/27/2017 08:45 AM    Epithelial cells MODERATE 05/27/2017 08:45 AM    Bacteria NEGATIVE  05/27/2017 08:45 AM    WBC 20-50 05/27/2017 08:45 AM    RBC 5-10 05/27/2017 08:45 AM         Medications Reviewed:     Current Facility-Administered Medications   Medication Dose Route Frequency    atorvastatin (LIPITOR) tablet 20 mg  20 mg Oral DAILY    cholestyramine light (QUESTRAN LITE) packet 4 g  4 g Oral TID WITH MEALS    insulin lispro (HUMALOG) injection 10 Units  10 Units SubCUTAneous TIDAC    sodium chloride (NS) flush 5-10 mL  5-10 mL IntraVENous Q8H    sodium chloride (NS) flush 5-10 mL  5-10 mL IntraVENous PRN    acetaminophen (TYLENOL) tablet 650 mg  650 mg Oral Q4H PRN    HYDROcodone-acetaminophen (NORCO) 5-325 mg per tablet 1 Tab  1 Tab Oral Q4H PRN    insulin lispro (HUMALOG) injection   SubCUTAneous AC&HS    glucose chewable tablet 16 g  4 Tab Oral PRN    glucagon (GLUCAGEN) injection 1 mg  1 mg IntraMUSCular PRN    0.9% sodium chloride infusion  50 mL/hr IntraVENous CONTINUOUS    dextrose 10 % infusion 125-250 mL  125-250 mL IntraVENous PRN    cefTRIAXone (ROCEPHIN) 1 g in 0.9% sodium chloride (MBP/ADV) 50 mL  1 g IntraVENous Q24H    pantoprazole (PROTONIX) tablet 40 mg  40 mg Oral ACB    losartan (COZAAR) tablet 50 mg  50 mg Oral DAILY    chlorthalidone (HYGROTEN) tablet 25 mg  25 mg Oral DAILY    ondansetron (ZOFRAN) injection 4 mg  4 mg IntraVENous Q4H PRN     ______________________________________________________________________  EXPECTED LENGTH OF STAY: - - -  ACTUAL LENGTH OF STAY:          1                 Merlin Aguilera MD

## 2017-05-28 NOTE — PROGRESS NOTES
Cardiology Progress Note         5/28/2017 10:05 AM    Admit Date: 5/27/2017    Admit Diagnosis: Syncope; Abdominal pain;LBBB (left bundle branch block)      Subjective:     Zuly Romero is seen today at request of Dr Jake Andino who covered for Dr Giovanna Azevedo yesterday. She has sinus bradycardia 44 bpm at times and clonidine has been stopped  She has had dizziness since Dr Giovanna Azevedo saw her for this back in October   She has abdominal pain and nausea, leg edema from lymphedema  So far she has known LBBB  She lives with , son and daughter in law     Past Medical History:   Diagnosis Date    Cancer [facility])     right breast ca with 12 lymph nodes involved    [facility]     Diverticulitis     Hypercholesterolemia     Hypertension     LBBB (left bundle branch block) 5/27/2017    Lymphedema of both lower extremities     Morbid obesity ([facility]     Osteoarthritis 10/13/2010    Retinopathy due to secondary diabetes ([facility]     Thyroid lump     nodule     Past Surgical History:   Procedure Laterality Date    COLONOSCOPY N/A 8/24/2016    COLONOSCOPY performed by Shantel Valencia MD at [address] [place] [address] ENDOSCOPY    HX CATARACT REMOVAL      HX CHOLECYSTECTOMY      HX GYN      hysterectomy    HX HYSTERECTOMY      HX MASTECTOMY      HX REFRACTIVE SURGERY      HX RETINAL DETACHMENT REPAIR       Social History     Social History    Marital status:      Spouse name: N/A    Number of children: N/A    Years of education: N/A     Occupational History    Not on file.      Social History Main Topics    Smoking status: Never Smoker    Smokeless tobacco: Never Used    Alcohol use No    Drug use: No    Sexual activity: Yes     Partners: Male     Other Topics Concern    Not on file     Social History Narrative    ** Merged History Encounter **              Visit Vitals    /59    Pulse 70    Temp 98.9 °F (37.2 °C)    Resp 17    Wt 277 lb 14.4 oz (126.1 kg)    SpO2 96%    BMI 39.87 kg/m2     Current Facility-Administered Medications   Medication Dose Route Frequency    atorvastatin (LIPITOR) tablet 20 mg  20 mg Oral DAILY    cholestyramine light (QUESTRAN LITE) packet 4 g  4 g Oral TID WITH MEALS    insulin lispro (HUMALOG) injection 10 Units  10 Units SubCUTAneous TIDAC    sodium chloride (NS) flush 5-10 mL  5-10 mL IntraVENous Q8H    sodium chloride (NS) flush 5-10 mL  5-10 mL IntraVENous PRN    acetaminophen (TYLENOL) tablet 650 mg  650 mg Oral Q4H PRN    HYDROcodone-acetaminophen (NORCO) 5-325 mg per tablet 1 Tab  1 Tab Oral Q4H PRN    insulin lispro (HUMALOG) injection   SubCUTAneous AC&HS    glucose chewable tablet 16 g  4 Tab Oral PRN    glucagon (GLUCAGEN) injection 1 mg  1 mg IntraMUSCular PRN    0.9% sodium chloride infusion  50 mL/hr IntraVENous CONTINUOUS    dextrose 10 % infusion 125-250 mL  125-250 mL IntraVENous PRN    cefTRIAXone (ROCEPHIN) 1 g in 0.9% sodium chloride (MBP/ADV) 50 mL  1 g IntraVENous Q24H    pantoprazole (PROTONIX) tablet 40 mg  40 mg Oral ACB    losartan (COZAAR) tablet 50 mg  50 mg Oral DAILY    chlorthalidone (HYGROTEN) tablet 25 mg  25 mg Oral DAILY    ondansetron (ZOFRAN) injection 4 mg  4 mg IntraVENous Q4H PRN         Objective:      Physical Exam:  Visit Vitals    /59    Pulse 70    Temp 98.9 °F (37.2 °C)    Resp 17    Wt 277 lb 14.4 oz (126.1 kg)    SpO2 96%    BMI 39.87 kg/m2     General Appearance:  Well developed, well nourished,alert and oriented x 3, and individual in no acute distress. Ears/Nose/Mouth/Throat:   Hearing grossly normal.         Neck: Supple. Chest:   Lungs with scattered crackles to auscultation bilaterally. Cardiovascular:  Irregular rhythm and slow rate, no murmur. Abdomen:   Soft, morbidly obese   Extremities: 3+ edema bilaterally. Skin: Warm and dry.                Data Review:   Labs:    Recent Results (from the past 24 hour(s))   HEMOGLOBIN A1C WITH EAG    Collection Time: 05/27/17 10:11 AM   Result Value Ref Range Hemoglobin A1c 8.9 (H) 4.2 - 6.3 %    Est. average glucose 209 mg/dL   GLUCOSE, POC    Collection Time: 05/27/17 10:54 AM   Result Value Ref Range    Glucose (POC) 273 (H) 65 - 100 mg/dL    Performed by Brendon OZUNA    EKG, 12 LEAD, INITIAL    Collection Time: 05/27/17 12:01 PM   Result Value Ref Range    Ventricular Rate 91 BPM    Atrial Rate 91 BPM    P-R Interval 176 ms    QRS Duration 158 ms    Q-T Interval 460 ms    QTC Calculation (Bezet) 565 ms    Calculated R Axis -41 degrees    Calculated T Axis 96 degrees    Diagnosis       Normal sinus rhythm  Left axis deviation  Left bundle branch block  When compared with ECG of 27-MAY-2017 06:42,  AK interval has decreased  Vent.  rate has increased BY  37 BPM  Confirmed by Arley Montoya M.D., Bulah Plump ([id number]) on 5/27/2017 1:41:50 PM     TROPONIN I    Collection Time: 05/27/17 12:03 PM   Result Value Ref Range    Troponin-I, Qt. <0.04 <0.05 ng/mL   CK W/ CKMB & INDEX    Collection Time: 05/27/17 12:03 PM   Result Value Ref Range    CK 75 26 - 192 U/L    CK - MB 2.1 <3.6 NG/ML    CK-MB Index 2.8 (H) 0 - 2.5     GLUCOSE, POC    Collection Time: 05/27/17  6:20 PM   Result Value Ref Range    Glucose (POC) 379 (H) 65 - 100 mg/dL    Performed by Marky Duran    TROPONIN I    Collection Time: 05/27/17  7:54 PM   Result Value Ref Range    Troponin-I, Qt. 0.04 <0.05 ng/mL   CK W/ CKMB & INDEX    Collection Time: 05/27/17  7:54 PM   Result Value Ref Range     26 - 192 U/L    CK - MB 2.8 <3.6 NG/ML    CK-MB Index 2.8 (H) 0 - 2.5     GLUCOSE, POC    Collection Time: 05/27/17 11:13 PM   Result Value Ref Range    Glucose (POC) 188 (H) 65 - 100 mg/dL    Performed by [address] [place] [address] [place], BASIC    Collection Time: 05/28/17  2:33 AM   Result Value Ref Range    Sodium 137 136 - 145 mmol/L    Potassium 4.2 3.5 - 5.1 mmol/L    Chloride 105 97 - 108 mmol/L    CO2 24 21 - 32 mmol/L    Anion gap 8 5 - 15 mmol/L    Glucose 167 (H) 65 - 100 mg/dL    BUN 29 (H) 6 - 20 MG/DL    Creatinine 1.22 (H) 0.55 - 1.02 MG/DL    BUN/Creatinine ratio 24 (H) 12 - 20      GFR est AA 53 (L) >60 ml/min/1.73m2    GFR est non-AA 44 (L) >60 ml/min/1.73m2    Calcium 8.7 8.5 - 10.1 MG/DL   GLUCOSE, POC    Collection Time: 05/28/17  8:15 AM   Result Value Ref Range    Glucose (POC) 209 (H) 65 - 100 mg/dL    Performed by Jerry Justice        Telemetry: sinus bradycardia LBBB PAC      Assessment:     Principal Problem:    Syncope (5/27/2017)    Active Problems:    Abdominal pain (5/27/2017)      Alternating constipation and diarrhea (5/27/2017)      Lymphedema (5/27/2017)      LBBB (left bundle branch block) (5/27/2017)    sinus bradycardia  PACs    Plan:     Patient is symptomatic with dizziness and had syncope before   This appears now to be related to conduction diseases and she meets indication for dual chamber pacemaker  LVEF was normal in October 2016  She agrees if by Tuesday she still has irreversible bradycardia, will plan for pacemaker implant  She had right breast cancer but is right handed and ok with left sided pacer  May stop IV fluid if can tolerate PO  I have discussed with Dr Clemente Torres M.D.  [facility]  Electrophysiology/Cardiology  [facility]  [address], [address], [address]  [facility], [address]                             [address] [place] [address]  [phone]

## 2017-05-29 LAB
ALBUMIN SERPL BCP-MCNC: 3.1 G/DL (ref 3.5–5)
ALBUMIN/GLOB SERPL: 0.7 {RATIO} (ref 1.1–2.2)
ALP SERPL-CCNC: 115 U/L (ref 45–117)
ALT SERPL-CCNC: 76 U/L (ref 12–78)
ANION GAP BLD CALC-SCNC: 8 MMOL/L (ref 5–15)
AST SERPL W P-5'-P-CCNC: 50 U/L (ref 15–37)
BACTERIA SPEC CULT: NORMAL
BILIRUB SERPL-MCNC: 0.5 MG/DL (ref 0.2–1)
BUN SERPL-MCNC: 22 MG/DL (ref 6–20)
BUN/CREAT SERPL: 18 (ref 12–20)
CALCIUM SERPL-MCNC: 9.2 MG/DL (ref 8.5–10.1)
CC UR VC: NORMAL
CHLORIDE SERPL-SCNC: 105 MMOL/L (ref 97–108)
CO2 SERPL-SCNC: 23 MMOL/L (ref 21–32)
CREAT SERPL-MCNC: 1.25 MG/DL (ref 0.55–1.02)
ERYTHROCYTE [DISTWIDTH] IN BLOOD BY AUTOMATED COUNT: 13 % (ref 11.5–14.5)
GLOBULIN SER CALC-MCNC: 4.4 G/DL (ref 2–4)
GLUCOSE BLD STRIP.AUTO-MCNC: 200 MG/DL (ref 65–100)
GLUCOSE BLD STRIP.AUTO-MCNC: 227 MG/DL (ref 65–100)
GLUCOSE BLD STRIP.AUTO-MCNC: 238 MG/DL (ref 65–100)
GLUCOSE BLD STRIP.AUTO-MCNC: 256 MG/DL (ref 65–100)
GLUCOSE SERPL-MCNC: 229 MG/DL (ref 65–100)
HCT VFR BLD AUTO: 35.7 % (ref 35–47)
HGB BLD-MCNC: 11.9 G/DL (ref 11.5–16)
MCH RBC QN AUTO: 32.2 PG (ref 26–34)
MCHC RBC AUTO-ENTMCNC: 33.3 G/DL (ref 30–36.5)
MCV RBC AUTO: 96.5 FL (ref 80–99)
PLATELET # BLD AUTO: 194 K/UL (ref 150–400)
POTASSIUM SERPL-SCNC: 4.8 MMOL/L (ref 3.5–5.1)
PROT SERPL-MCNC: 7.5 G/DL (ref 6.4–8.2)
RBC # BLD AUTO: 3.7 M/UL (ref 3.8–5.2)
SERVICE CMNT-IMP: ABNORMAL
SERVICE CMNT-IMP: NORMAL
SODIUM SERPL-SCNC: 136 MMOL/L (ref 136–145)
WBC # BLD AUTO: 7 K/UL (ref 3.6–11)

## 2017-05-29 PROCEDURE — 74011250637 HC RX REV CODE- 250/637: Performed by: HOSPITALIST

## 2017-05-29 PROCEDURE — 74011250637 HC RX REV CODE- 250/637: Performed by: INTERNAL MEDICINE

## 2017-05-29 PROCEDURE — 74011636637 HC RX REV CODE- 636/637: Performed by: HOSPITALIST

## 2017-05-29 PROCEDURE — 80053 COMPREHEN METABOLIC PANEL: CPT | Performed by: HOSPITALIST

## 2017-05-29 PROCEDURE — 74011250636 HC RX REV CODE- 250/636: Performed by: NURSE PRACTITIONER

## 2017-05-29 PROCEDURE — 74011250636 HC RX REV CODE- 250/636: Performed by: HOSPITALIST

## 2017-05-29 PROCEDURE — 74011000258 HC RX REV CODE- 258: Performed by: HOSPITALIST

## 2017-05-29 PROCEDURE — 85027 COMPLETE CBC AUTOMATED: CPT | Performed by: INTERNAL MEDICINE

## 2017-05-29 PROCEDURE — 65660000000 HC RM CCU STEPDOWN

## 2017-05-29 PROCEDURE — 36415 COLL VENOUS BLD VENIPUNCTURE: CPT | Performed by: HOSPITALIST

## 2017-05-29 PROCEDURE — 82962 GLUCOSE BLOOD TEST: CPT

## 2017-05-29 RX ORDER — SODIUM CHLORIDE 0.9 % (FLUSH) 0.9 %
5-10 SYRINGE (ML) INJECTION AS NEEDED
Status: DISCONTINUED | OUTPATIENT
Start: 2017-05-29 | End: 2017-05-30

## 2017-05-29 RX ORDER — SODIUM CHLORIDE 0.9 % (FLUSH) 0.9 %
5-10 SYRINGE (ML) INJECTION EVERY 8 HOURS
Status: DISCONTINUED | OUTPATIENT
Start: 2017-05-29 | End: 2017-05-30 | Stop reason: HOSPADM

## 2017-05-29 RX ORDER — POLYETHYLENE GLYCOL 3350 17 G/17G
17 POWDER, FOR SOLUTION ORAL DAILY
Status: DISCONTINUED | OUTPATIENT
Start: 2017-05-29 | End: 2017-06-01 | Stop reason: HOSPADM

## 2017-05-29 RX ORDER — HYDRALAZINE HYDROCHLORIDE 25 MG/1
25 TABLET, FILM COATED ORAL 3 TIMES DAILY
Status: DISCONTINUED | OUTPATIENT
Start: 2017-05-29 | End: 2017-06-01 | Stop reason: HOSPADM

## 2017-05-29 RX ADMIN — ONDANSETRON 4 MG: 2 INJECTION INTRAMUSCULAR; INTRAVENOUS at 02:21

## 2017-05-29 RX ADMIN — LACTULOSE 10 G: 10 SOLUTION ORAL at 12:02

## 2017-05-29 RX ADMIN — LACTULOSE 10 G: 10 SOLUTION ORAL at 17:47

## 2017-05-29 RX ADMIN — INSULIN LISPRO 10 UNITS: 100 INJECTION, SOLUTION INTRAVENOUS; SUBCUTANEOUS at 08:02

## 2017-05-29 RX ADMIN — Medication 10 ML: at 05:48

## 2017-05-29 RX ADMIN — INSULIN LISPRO 3 UNITS: 100 INJECTION, SOLUTION INTRAVENOUS; SUBCUTANEOUS at 08:03

## 2017-05-29 RX ADMIN — INSULIN LISPRO 3 UNITS: 100 INJECTION, SOLUTION INTRAVENOUS; SUBCUTANEOUS at 22:01

## 2017-05-29 RX ADMIN — INSULIN LISPRO 10 UNITS: 100 INJECTION, SOLUTION INTRAVENOUS; SUBCUTANEOUS at 17:46

## 2017-05-29 RX ADMIN — Medication 10 ML: at 02:21

## 2017-05-29 RX ADMIN — CEFTRIAXONE 1 G: 1 INJECTION, POWDER, FOR SOLUTION INTRAMUSCULAR; INTRAVENOUS at 10:07

## 2017-05-29 RX ADMIN — Medication 10 ML: at 22:02

## 2017-05-29 RX ADMIN — INSULIN GLARGINE 15 UNITS: 100 INJECTION, SOLUTION SUBCUTANEOUS at 22:01

## 2017-05-29 RX ADMIN — Medication 10 ML: at 10:08

## 2017-05-29 RX ADMIN — LOSARTAN POTASSIUM 50 MG: 50 TABLET ORAL at 09:29

## 2017-05-29 RX ADMIN — HYDROCODONE BITARTRATE AND ACETAMINOPHEN 1 TABLET: 5; 325 TABLET ORAL at 22:00

## 2017-05-29 RX ADMIN — HYDRALAZINE HYDROCHLORIDE 25 MG: 25 TABLET, FILM COATED ORAL at 22:00

## 2017-05-29 RX ADMIN — ONDANSETRON 4 MG: 2 INJECTION INTRAMUSCULAR; INTRAVENOUS at 09:26

## 2017-05-29 RX ADMIN — INSULIN GLARGINE 15 UNITS: 100 INJECTION, SOLUTION SUBCUTANEOUS at 08:02

## 2017-05-29 RX ADMIN — ONDANSETRON 4 MG: 2 INJECTION INTRAMUSCULAR; INTRAVENOUS at 22:05

## 2017-05-29 RX ADMIN — CHLORTHALIDONE 25 MG: 25 TABLET ORAL at 09:30

## 2017-05-29 RX ADMIN — HYDRALAZINE HYDROCHLORIDE 25 MG: 25 TABLET, FILM COATED ORAL at 09:29

## 2017-05-29 RX ADMIN — ATORVASTATIN CALCIUM 20 MG: 20 TABLET, FILM COATED ORAL at 09:30

## 2017-05-29 RX ADMIN — INSULIN LISPRO 3 UNITS: 100 INJECTION, SOLUTION INTRAVENOUS; SUBCUTANEOUS at 12:02

## 2017-05-29 RX ADMIN — INSULIN LISPRO 10 UNITS: 100 INJECTION, SOLUTION INTRAVENOUS; SUBCUTANEOUS at 12:01

## 2017-05-29 RX ADMIN — INSULIN LISPRO 3 UNITS: 100 INJECTION, SOLUTION INTRAVENOUS; SUBCUTANEOUS at 17:47

## 2017-05-29 RX ADMIN — HYDRALAZINE HYDROCHLORIDE 25 MG: 25 TABLET, FILM COATED ORAL at 15:45

## 2017-05-29 RX ADMIN — POLYETHYLENE GLYCOL 3350 17 G: 17 POWDER, FOR SOLUTION ORAL at 12:03

## 2017-05-29 RX ADMIN — Medication 10 ML: at 22:00

## 2017-05-29 NOTE — PROGRESS NOTES
Hospitalist Progress Note  Winifred Marcos MD  Office: [phone]        Date of Service:  2017  NAME:  Mary Metzger  :  1950  MRN:  [id number]      Admission Summary: This is a 78year old female who came to the hospital for evaluation of multiple symptoms which included dizziness, syncope, abdominal pain, nausea,sob. Most of these symptoms are chronic per patient report but worse now. Past Medical History:   Diagnosis Date    Cancer [facility])     right breast ca with 12 lymph nodes involved    Diabetes [facility] [address]     Diverticulitis     Hypercholesterolemia     Hypertension     LBBB (left bundle branch block) 2017    Lymphedema of both lower extremities     [facility]bid [facility]     Osteoarthritis 10/13/2010    Retinopathy due to secondary diabetes ([facility]     Thyroid lump     nodule       Interval history / Subjective:   17  No complaints. She slept in the recliner chair as she does at home       Assessment & Plan:     Syncope, recurrent.   -She had a long-standing vertigo, but passing out has become frequent recently  -She was found to be bradycardiac on monitor  -Cardiology consulted,recommended PPM placement Tuesday if bradycardia persistent. Discussed with Dr Katharina Boswell      Left bundle branch block,condution disease. BNP is slightly elevated. Cardiac enzymes   are negative.   -Echocardiogram with normal EF,moderate TR and MR  -Plan for PPM per cardiology     Abdominal pain associated with chronic alternating diarrhea and   Constipation.  - She has had GI evaluation with colonoscopy for this in  the past which according to the patient, did not show anything. -CAT scan tof the abdomen and pelvis is unremarkable; however, liver   enzymes are slightly elevated  - Liver US,probable hepatic steatosis  -Recheck LFTs in AM      Dehydration.  The patient has slightly elevated BUN and creatinine   and also liver enzymes, could be from the diuretics. We will hold Lasix   and administer normal saline at 50 mL per hour for 24 hours, repeat   labs. Insulin-dependent diabetes. -Humalog and Lantus. Accucheks. Humalog SS    Hypertension.  -Clonidine stopped per cards, started on cozaar. Extreme obesity. Body mass index is 39.83 kg/(m^2). The patient is morbidly obese, is diabetic and   hypertensive. She has lymphedema that limits her activities. I have   counseled her on dietary measures and physical activity as much as   she can. Code status: full  DVT prophylaxis: scd    Care Plan discussed with: Patient/Family and Nurse  Disposition: PPM placement tomorrow,anticipate d/c the following day . Hospital Problems  Date Reviewed: 1/20/2017          Codes Class Noted POA    * (Principal)Syncope ICD-10-CM: R55  ICD-9-CM: 780.2  5/27/2017 Unknown        Abdominal pain ICD-10-CM: R10.9  ICD-9-CM: 789.00  5/27/2017 Unknown        Alternating constipation and diarrhea ICD-10-CM: R19.8  ICD-9-CM: 787.99  5/27/2017 Unknown        Lymphedema ICD-10-CM: I89.0  ICD-9-CM: 457.1  5/27/2017 Unknown        LBBB (left bundle branch block) ICD-10-CM: I44.7  ICD-9-CM: 426.3  5/27/2017 Unknown                Review of Systems:   Pertinent items are noted in HPI. Vital Signs:    Last 24hrs VS reviewed since prior progress note. Most recent are:  Visit Vitals    /79 (BP 1 Location: Left arm, BP Patient Position: Sitting)    Pulse 74    Temp 97.8 °F (36.6 °C)    Resp 16    Wt 125.9 kg (277 lb 9 oz)    SpO2 97%    BMI 39.83 kg/m2         Intake/Output Summary (Last 24 hours) at 05/29/17 1439  Last data filed at 05/29/17 0513   Gross per 24 hour   Intake           1607.5 ml   Output              950 ml   Net            657.5 ml        Physical Examination:             Constitutional:  No acute distress, cooperative, pleasant    ENT:  Oral mucous moist, oropharynx benign. Neck supple,    Resp:  CTA bilaterally. No wheezing/rhonchi/rales.  No accessory muscle use   CV:  Regular rhythm, normal rate, no murmurs, gallops, rubs    GI:  Soft,obese, non tender. normoactive bowel sounds, no hepatosplenomegaly     Musculoskeletal: Non pitting edema. Neurologic:  Moves all extremities. AAOx3, CN II-XII reviewed     Psych:  Good insight, Not anxious nor agitated. Data Review:    Review and/or order of clinical lab test  Review and/or order of tests in the radiology section of CPT  Review and/or order of tests in the medicine section of CPT      Labs:     Recent Labs      05/29/17   0942  05/27/17   0648   WBC  7.0  7.0   HGB  11.9  13.0   HCT  35.7  39.1   PLT  194  218     Recent Labs      05/29/17   0301  05/28/17   0233  05/27/17   0648   NA  136  137  137   K  4.8  4.2  4.4   CL  105  105  102   CO2  23  24  25   BUN  22*  29*  29*   CREA  1.25*  1.22*  1.16*   GLU  229*  167*  297*   CA  9.2  8.7  9.3   MG   --    --   1.8   PHOS   --    --   3.4     Recent Labs      05/29/17   0301  05/27/17   0648   SGOT  50*  75*   ALT  76  103*   AP  115  135*   TBILI  0.5  0.4   TP  7.5  7.3   ALB  3.1*  3.0*   GLOB  4.4*  4.3*   AML   --   23*   LPSE   --   93     Recent Labs      05/27/17   0648   INR  1.0   PTP  9.9   APTT  25.4      No results for input(s): FE, TIBC, PSAT, FERR in the last 72 hours. No results found for: FOL, RBCF   No results for input(s): PH, PCO2, PO2 in the last 72 hours.   Recent Labs      05/27/17   1954  05/27/17   1203  05/27/17   0648   CPK  101  75  74   CKNDX  2.8*  2.8*  2.6*   TROIQ  0.04  <0.04  <0.04     Lab Results   Component Value Date/Time    Cholesterol, total 242 10/19/2016 11:49 AM    HDL Cholesterol 39 10/19/2016 11:49 AM    LDL, calculated 150 10/19/2016 11:49 AM    Triglyceride 264 10/19/2016 11:49 AM     Lab Results   Component Value Date/Time    Glucose (POC) 227 05/29/2017 11:43 AM    Glucose (POC) 238 05/29/2017 06:08 AM    Glucose (POC) 202 05/28/2017 09:21 PM    Glucose (POC) 177 05/28/2017 05:31 PM Glucose (POC) 258 05/28/2017 11:53 AM     Lab Results   Component Value Date/Time    Color YELLOW/STRAW 05/27/2017 08:45 AM    Appearance CLEAR 05/27/2017 08:45 AM    Specific gravity 1.015 05/27/2017 08:45 AM    Specific gravity 1.014 01/13/2017 01:33 PM    pH (UA) 5.0 05/27/2017 08:45 AM    Protein 100 05/27/2017 08:45 AM    Glucose 500 05/27/2017 08:45 AM    Ketone NEGATIVE  05/27/2017 08:45 AM    Bilirubin NEGATIVE  05/27/2017 08:45 AM    Urobilinogen 0.2 05/27/2017 08:45 AM    Nitrites NEGATIVE  05/27/2017 08:45 AM    Leukocyte Esterase TRACE 05/27/2017 08:45 AM    Epithelial cells MODERATE 05/27/2017 08:45 AM    Bacteria NEGATIVE  05/27/2017 08:45 AM    WBC 20-50 05/27/2017 08:45 AM    RBC 5-10 05/27/2017 08:45 AM         Medications Reviewed:     Current Facility-Administered Medications   Medication Dose Route Frequency    sodium chloride (NS) flush 5-10 mL  5-10 mL IntraVENous Q8H    sodium chloride (NS) flush 5-10 mL  5-10 mL IntraVENous PRN    [START ON 5/30/2017] ceFAZolin (ANCEF) 3 g in 0.9%  ml IVPB  3 g IntraVENous ONCE    hydrALAZINE (APRESOLINE) tablet 25 mg  25 mg Oral TID    lactulose (CHRONULAC) solution 10 g  10 g Oral BID    polyethylene glycol (MIRALAX) packet 17 g  17 g Oral DAILY    insulin glargine (LANTUS) injection 15 Units  15 Units SubCUTAneous ACB/HS    atorvastatin (LIPITOR) tablet 20 mg  20 mg Oral DAILY    insulin lispro (HUMALOG) injection 10 Units  10 Units SubCUTAneous TIDAC    sodium chloride (NS) flush 5-10 mL  5-10 mL IntraVENous Q8H    sodium chloride (NS) flush 5-10 mL  5-10 mL IntraVENous PRN    acetaminophen (TYLENOL) tablet 650 mg  650 mg Oral Q4H PRN    HYDROcodone-acetaminophen (NORCO) 5-325 mg per tablet 1 Tab  1 Tab Oral Q4H PRN    insulin lispro (HUMALOG) injection   SubCUTAneous AC&HS    glucose chewable tablet 16 g  4 Tab Oral PRN    glucagon (GLUCAGEN) injection 1 mg  1 mg IntraMUSCular PRN    0.9% sodium chloride infusion  50 mL/hr IntraVENous CONTINUOUS    dextrose 10 % infusion 125-250 mL  125-250 mL IntraVENous PRN    cefTRIAXone (ROCEPHIN) 1 g in 0.9% sodium chloride (MBP/ADV) 50 mL  1 g IntraVENous Q24H    losartan (COZAAR) tablet 50 mg  50 mg Oral DAILY    chlorthalidone (HYGROTEN) tablet 25 mg  25 mg Oral DAILY    ondansetron (ZOFRAN) injection 4 mg  4 mg IntraVENous Q4H PRN     ______________________________________________________________________  EXPECTED LENGTH OF STAY: - - -  ACTUAL LENGTH OF STAY:          2                 Tim Robertson MD

## 2017-05-29 NOTE — PROGRESS NOTES
Spiritual Care Assessment/Progress Notes    [facility] [id number]  [id number]    1950  78 y.o.  female    Patient Telephone Number: [phone] (home)   Baptist Affiliation: No Judaism   Language: English   Extended Emergency Contact Information  Primary Emergency Contact: Socorro Peñaloza  Address: [address]           [address] Phone: [phone]  Relation: Spouse  Secondary Emergency Contact: Milton Riddleik  Address: [address] [place] [address] Phone: [phone]  Relation: Child   Patient Active Problem List    Diagnosis Date Noted    Syncope 05/27/2017    Abdominal pain 05/27/2017    Alternating constipation and diarrhea 05/27/2017    Lymphedema 05/27/2017    LBBB (left bundle branch block) 05/27/2017    Insulin dependent diabetes mellitus ([facility] 06/22/2016    Essential hypertension with goal blood pressure less than 140/90 06/22/2016    History of right breast cancer 06/22/2016    Lymphedema of both lower extremities 06/22/2016    MAXWELL (dyspnea on exertion) 10/13/2010    Joint pain 10/13/2010    Hypertension 10/13/2010    Diabetes ([facility] 10/13/2010    Morbidly obese ([facility] 10/13/2010    Osteoarthritis 10/13/2010        Date: 5/29/2017       Level of Baptist/Spiritual Activity:  []         Involved in deann tradition/spiritual practice    []         Not involved in deann tradition/spiritual practice  [x]         Spiritually oriented    []         Claims no spiritual orientation    []         seeking spiritual identity  []         Feels alienated from Denominational practice/tradition  []         Feels angry about Denominational practice/tradition  []         Spirituality/Denominational tradition  a resource for coping at this time.   []         Not able to assess due to medical condition    Services Provided Today:  []         crisis intervention    []         reading Scriptures  [x]         spiritual assessment    []         prayer  [x]         empathic listening/emotional support  []         rites and rituals (cite in comments)  []         life review     []         Christianity support  []         theological development   []         advocacy  []         ethical dialog     []         blessing  []         bereavement support    []         support to family  []         anticipatory grief support   [x]         help with AMD  []         spiritual guidance    []         meditation      Spiritual Care Needs  []         Emotional Support  []         Spiritual/Orthodox Care  []         Loss/Adjustment  []         Advocacy/Referral                /Ethics  []         No needs expressed at               this time  [x]         Other: (note in               comments)  Spiritual Care Plan  []         Follow up visits with               pt/family  []         Provide materials  []         Schedule sacraments  []         Contact Community               Clergy  [x]         Follow up as needed  []         Other: (note in               comments)     Comments: Responded to request from In Basket for and AMD consult. Miss Ishan Pringle indicated she has an AMD she filled out a while ago. She shared that it would still have her living will wishes on it. Encouraged her to bring the form in for her records. She shared she has her out relationship with God and good family/freinds support. Provided assurance of prayers.   Visited by: Ramon Mario [id number] [facility] ([id number])

## 2017-05-29 NOTE — ACP (ADVANCE CARE PLANNING)
Responded to request from In Basket for and AMD consult. Miss Eran Jose indicated she has an AMD she filled out a while ago. She shared that it would still have her living will wishes on it. Encouraged her to bring the form in for her records.     Visited by: Russell Luis [address] [facility] [place] ([id number])

## 2017-05-29 NOTE — PROGRESS NOTES
Bedside shift change report given to MARKO Avila (oncoming nurse) by Mick Romero (offgoing nurse). Report included the following information SBAR, Kardex, Intake/Output, MAR, Recent Results and Cardiac Rhythm AFib;prabhu.

## 2017-05-29 NOTE — PROGRESS NOTES
Cardiology Progress Note         5/29/2017 10:05 AM    Admit Date: 5/27/2017    Admit Diagnosis: Syncope; Abdominal pain;LBBB (left bundle branch block)      Subjective:     Yamile Carpio is still in sinus bradycardia 40-50 bpm and clonidine has been stopped 3 days  She has had dizziness since Dr Yu Solomon saw her for this back in October   She has abdominal pain and nausea, leg edema from lymphedema  she has known LBBB  She lives with , son and daughter in law   No chest pain    Past Medical History:   Diagnosis Date    [facility])     right breast ca with 12 lymph nodes involved    [facility]     Diverticulitis     Hypercholesterolemia     Hypertension     LBBB (left bundle branch block) 5/27/2017    Lymphedema of both lower extremities     Morbid obesity ([facility]     Osteoarthritis 10/13/2010    Retinopathy due to secondary diabetes ([facility]     Thyroid lump     nodule     Past Surgical History:   Procedure Laterality Date    COLONOSCOPY N/A 8/24/2016    COLONOSCOPY performed by Bridgett Rojas [facility] at [facility] ENDOSCOPY    HX CATARACT REMOVAL      HX CHOLECYSTECTOMY      HX GYN      hysterectomy    HX HYSTERECTOMY      HX MASTECTOMY      HX REFRACTIVE SURGERY      HX RETINAL DETACHMENT REPAIR       Social History     Social History    Marital status:      Spouse name: N/A    Number of children: N/A    Years of education: N/A     Occupational History    Not on file.      Social History Main Topics    Smoking status: Never Smoker    Smokeless tobacco: Never Used    Alcohol use No    Drug use: No    Sexual activity: Yes     Partners: Male     Other Topics Concern    Not on file     Social History Narrative    ** Merged History Encounter **              Visit Vitals    /79 (BP 1 Location: Left arm, BP Patient Position: Sitting)    Pulse (!) 51    Temp 97.8 °F (36.6 °C)    Resp 16    Wt 277 lb 9 oz (125.9 kg)    SpO2 100%    BMI 39.83 kg/m2     Current Facility-Administered Medications   Medication Dose Route Frequency    insulin glargine (LANTUS) injection 15 Units  15 Units SubCUTAneous ACB/HS    atorvastatin (LIPITOR) tablet 20 mg  20 mg Oral DAILY    cholestyramine light (QUESTRAN LITE) packet 4 g  4 g Oral TID WITH MEALS    insulin lispro (HUMALOG) injection 10 Units  10 Units SubCUTAneous TIDAC    sodium chloride (NS) flush 5-10 mL  5-10 mL IntraVENous Q8H    sodium chloride (NS) flush 5-10 mL  5-10 mL IntraVENous PRN    acetaminophen (TYLENOL) tablet 650 mg  650 mg Oral Q4H PRN    HYDROcodone-acetaminophen (NORCO) 5-325 mg per tablet 1 Tab  1 Tab Oral Q4H PRN    insulin lispro (HUMALOG) injection   SubCUTAneous AC&HS    glucose chewable tablet 16 g  4 Tab Oral PRN    glucagon (GLUCAGEN) injection 1 mg  1 mg IntraMUSCular PRN    0.9% sodium chloride infusion  50 mL/hr IntraVENous CONTINUOUS    dextrose 10 % infusion 125-250 mL  125-250 mL IntraVENous PRN    cefTRIAXone (ROCEPHIN) 1 g in 0.9% sodium chloride (MBP/ADV) 50 mL  1 g IntraVENous Q24H    losartan (COZAAR) tablet 50 mg  50 mg Oral DAILY    chlorthalidone (HYGROTEN) tablet 25 mg  25 mg Oral DAILY    ondansetron (ZOFRAN) injection 4 mg  4 mg IntraVENous Q4H PRN         Objective:      Physical Exam:  Visit Vitals    /79 (BP 1 Location: Left arm, BP Patient Position: Sitting)    Pulse (!) 51    Temp 97.8 °F (36.6 °C)    Resp 16    Wt 277 lb 9 oz (125.9 kg)    SpO2 100%    BMI 39.83 kg/m2     General Appearance:  Well developed, well nourished,alert and oriented x 3, and individual in no acute distress. Ears/Nose/Mouth/Throat:   Hearing grossly normal.         Neck: Supple. Chest:   Lungs with scattered crackles to auscultation bilaterally. Cardiovascular:  Irregular rhythm and slow rate, no murmur. Abdomen:   Soft, morbidly obese   Extremities: 3+ edema bilaterally. Skin: Warm and dry.                Data Review:   Labs:    Recent Results (from the past 24 hour(s))   GLUCOSE, POC Collection Time: 05/28/17  8:15 AM   Result Value Ref Range    Glucose (POC) 209 (H) 65 - 100 mg/dL    Performed by [address] [place] [address], POC    Collection Time: 05/28/17 11:53 AM   Result Value Ref Range    Glucose (POC) 258 (H) 65 - 100 mg/dL    Performed by Alicia STRATTON    GLUCOSE, POC    Collection Time: 05/28/17  5:31 PM   Result Value Ref Range    Glucose (POC) 177 (H) 65 - 100 mg/dL    Performed by Alicia STRATTON    GLUCOSE, POC    Collection Time: 05/28/17  9:21 PM   Result Value Ref Range    Glucose (POC) 202 (H) 65 - 100 mg/dL    Performed by [facility]    METABOLIC PANEL, COMPREHENSIVE    Collection Time: 05/29/17  3:01 AM   Result Value Ref Range    Sodium 136 136 - 145 mmol/L    Potassium 4.8 3.5 - 5.1 mmol/L    Chloride 105 97 - 108 mmol/L    CO2 23 21 - 32 mmol/L    Anion gap 8 5 - 15 mmol/L    Glucose 229 (H) 65 - 100 mg/dL    BUN 22 (H) 6 - 20 MG/DL    Creatinine 1.25 (H) 0.55 - 1.02 MG/DL    BUN/Creatinine ratio 18 12 - 20      GFR est AA 52 (L) >60 ml/min/1.73m2    GFR est non-AA 43 (L) >60 ml/min/1.73m2    Calcium 9.2 8.5 - 10.1 MG/DL    Bilirubin, total 0.5 0.2 - 1.0 MG/DL    ALT (SGPT) 76 12 - 78 U/L    AST (SGOT) 50 (H) 15 - 37 U/L    Alk.  phosphatase 115 45 - 117 U/L    Protein, total 7.5 6.4 - 8.2 g/dL    Albumin 3.1 (L) 3.5 - 5.0 g/dL    Globulin 4.4 (H) 2.0 - 4.0 g/dL    A-G Ratio 0.7 (L) 1.1 - 2.2     GLUCOSE, POC    Collection Time: 05/29/17  6:08 AM   Result Value Ref Range    Glucose (POC) 238 (H) 65 - 100 mg/dL    Performed by Evert Salas (PCT)        Telemetry: sinus bradycardia LBBB       Assessment:     Principal Problem:    Syncope (5/27/2017)    Active Problems:    Abdominal pain (5/27/2017)      Alternating constipation and diarrhea (5/27/2017)      Lymphedema (5/27/2017)      LBBB (left bundle branch block) (5/27/2017)    sinus bradycardia  PACs    Plan:     Patient is symptomatic with dizziness and had syncope before   This appears now to be related to conduction diseases, sinus node dysfunction and she meets indication for dual chamber pacemaker  LVEF was normal in October 2016  She agrees with plan for pacemaker implant tomorrow  She had right breast cancer but is right handed and ok with left sided pacer  Start hydralazine for high BP  Risks involve device implant include but are not limited to bleeding, infection, valvular damage, heart attack, stroke, lung collapse (pneumothorax or hemothorax), heart collapse (pericardial tamponade), heart perforation, kidney failure, death. Elective or emergency surgery may be required to repair some of these complications. Prolonged hospitalization would be required. General anesthesia may be required for the procedure. Lester Burgess M.D.  [facility]  Electrophysiology/Cardiology  [facility]  [address] [facility], Reji Betancourt [address] [place] [address]  [phone]

## 2017-05-29 NOTE — ROUTINE PROCESS
Bedside shift change report given to Orly Cortez (oncoming nurse) by Arron Bowers (offgoing nurse). Report included the following information SBAR, Kardex, Recent Results and Cardiac Rhythm SB c IVCD.

## 2017-05-30 ENCOUNTER — APPOINTMENT (OUTPATIENT)
Dept: GENERAL RADIOLOGY | Age: 67
DRG: 243 | End: 2017-05-30
Attending: INTERNAL MEDICINE
Payer: MEDICARE

## 2017-05-30 LAB
GLUCOSE BLD STRIP.AUTO-MCNC: 142 MG/DL (ref 65–100)
GLUCOSE BLD STRIP.AUTO-MCNC: 209 MG/DL (ref 65–100)
GLUCOSE BLD STRIP.AUTO-MCNC: 230 MG/DL (ref 65–100)
GLUCOSE BLD STRIP.AUTO-MCNC: 315 MG/DL (ref 65–100)
SERVICE CMNT-IMP: ABNORMAL

## 2017-05-30 PROCEDURE — 77030011640 HC PAD GRND REM COVD -A

## 2017-05-30 PROCEDURE — 74011250637 HC RX REV CODE- 250/637: Performed by: HOSPITALIST

## 2017-05-30 PROCEDURE — 77030012935 HC DRSG AQUACEL BMS -B

## 2017-05-30 PROCEDURE — 74011250636 HC RX REV CODE- 250/636: Performed by: HOSPITALIST

## 2017-05-30 PROCEDURE — 74011250637 HC RX REV CODE- 250/637: Performed by: NURSE PRACTITIONER

## 2017-05-30 PROCEDURE — 74011000250 HC RX REV CODE- 250: Performed by: INTERNAL MEDICINE

## 2017-05-30 PROCEDURE — 71010 XR CHEST PORT: CPT

## 2017-05-30 PROCEDURE — 77030018729 HC ELECTRD DEFIB PAD CARD -B

## 2017-05-30 PROCEDURE — 82962 GLUCOSE BLOOD TEST: CPT

## 2017-05-30 PROCEDURE — 74011250637 HC RX REV CODE- 250/637: Performed by: INTERNAL MEDICINE

## 2017-05-30 PROCEDURE — 77030018547 HC SUT ETHBND1 J&J -B

## 2017-05-30 PROCEDURE — 02H63JZ INSERTION OF PACEMAKER LEAD INTO RIGHT ATRIUM, PERCUTANEOUS APPROACH: ICD-10-PCS | Performed by: INTERNAL MEDICINE

## 2017-05-30 PROCEDURE — 0JH606Z INSERTION OF PACEMAKER, DUAL CHAMBER INTO CHEST SUBCUTANEOUS TISSUE AND FASCIA, OPEN APPROACH: ICD-10-PCS | Performed by: INTERNAL MEDICINE

## 2017-05-30 PROCEDURE — 74011636637 HC RX REV CODE- 636/637: Performed by: HOSPITALIST

## 2017-05-30 PROCEDURE — 77030031139 HC SUT VCRL2 J&J -A

## 2017-05-30 PROCEDURE — 33208 INSRT HEART PM ATRIAL & VENT: CPT

## 2017-05-30 PROCEDURE — A4565 SLINGS: HCPCS

## 2017-05-30 PROCEDURE — 74011250636 HC RX REV CODE- 250/636: Performed by: INTERNAL MEDICINE

## 2017-05-30 PROCEDURE — C1892 INTRO/SHEATH,FIXED,PEEL-AWAY: HCPCS

## 2017-05-30 PROCEDURE — 74011000272 HC RX REV CODE- 272: Performed by: INTERNAL MEDICINE

## 2017-05-30 PROCEDURE — 02HK3JZ INSERTION OF PACEMAKER LEAD INTO RIGHT VENTRICLE, PERCUTANEOUS APPROACH: ICD-10-PCS | Performed by: INTERNAL MEDICINE

## 2017-05-30 PROCEDURE — C1785 PMKR, DUAL, RATE-RESP: HCPCS

## 2017-05-30 PROCEDURE — 65660000000 HC RM CCU STEPDOWN

## 2017-05-30 PROCEDURE — 74011000258 HC RX REV CODE- 258: Performed by: HOSPITALIST

## 2017-05-30 PROCEDURE — C1898 LEAD, PMKR, OTHER THAN TRANS: HCPCS

## 2017-05-30 RX ORDER — VANCOMYCIN HYDROCHLORIDE 1 G/20ML
1 INJECTION, POWDER, LYOPHILIZED, FOR SOLUTION INTRAVENOUS ONCE
Status: COMPLETED | OUTPATIENT
Start: 2017-05-30 | End: 2017-05-30

## 2017-05-30 RX ORDER — LIDOCAINE HYDROCHLORIDE 10 MG/ML
10-40 INJECTION INFILTRATION; PERINEURAL AS NEEDED
Status: DISCONTINUED | OUTPATIENT
Start: 2017-05-30 | End: 2017-05-30

## 2017-05-30 RX ORDER — FENTANYL CITRATE 50 UG/ML
25-200 INJECTION, SOLUTION INTRAMUSCULAR; INTRAVENOUS
Status: DISCONTINUED | OUTPATIENT
Start: 2017-05-30 | End: 2017-05-30

## 2017-05-30 RX ORDER — ONDANSETRON 2 MG/ML
4 INJECTION INTRAMUSCULAR; INTRAVENOUS
Status: COMPLETED | OUTPATIENT
Start: 2017-05-30 | End: 2017-05-30

## 2017-05-30 RX ORDER — NALOXONE HYDROCHLORIDE 0.4 MG/ML
0.4 INJECTION, SOLUTION INTRAMUSCULAR; INTRAVENOUS; SUBCUTANEOUS AS NEEDED
Status: DISCONTINUED | OUTPATIENT
Start: 2017-05-30 | End: 2017-05-31

## 2017-05-30 RX ORDER — SODIUM CHLORIDE 9 MG/ML
500 INJECTION, SOLUTION INTRAVENOUS ONCE
Status: COMPLETED | OUTPATIENT
Start: 2017-05-30 | End: 2017-05-30

## 2017-05-30 RX ORDER — SODIUM CHLORIDE 0.9 % (FLUSH) 0.9 %
5-10 SYRINGE (ML) INJECTION EVERY 8 HOURS
Status: DISCONTINUED | OUTPATIENT
Start: 2017-05-30 | End: 2017-06-01 | Stop reason: HOSPADM

## 2017-05-30 RX ORDER — CALCIUM CARBONATE 200(500)MG
200 TABLET,CHEWABLE ORAL
Status: DISCONTINUED | OUTPATIENT
Start: 2017-05-30 | End: 2017-06-01 | Stop reason: HOSPADM

## 2017-05-30 RX ORDER — SODIUM CHLORIDE 0.9 % (FLUSH) 0.9 %
5-10 SYRINGE (ML) INJECTION AS NEEDED
Status: DISCONTINUED | OUTPATIENT
Start: 2017-05-30 | End: 2017-05-31

## 2017-05-30 RX ORDER — SODIUM CHLORIDE 0.9 % (FLUSH) 0.9 %
5-10 SYRINGE (ML) INJECTION AS NEEDED
Status: DISCONTINUED | OUTPATIENT
Start: 2017-05-30 | End: 2017-05-30

## 2017-05-30 RX ORDER — MIDAZOLAM HYDROCHLORIDE 1 MG/ML
.5-1 INJECTION, SOLUTION INTRAMUSCULAR; INTRAVENOUS
Status: DISCONTINUED | OUTPATIENT
Start: 2017-05-30 | End: 2017-05-30

## 2017-05-30 RX ADMIN — CEFTRIAXONE 1 G: 1 INJECTION, POWDER, FOR SOLUTION INTRAMUSCULAR; INTRAVENOUS at 10:58

## 2017-05-30 RX ADMIN — INSULIN GLARGINE 15 UNITS: 100 INJECTION, SOLUTION SUBCUTANEOUS at 21:48

## 2017-05-30 RX ADMIN — INSULIN GLARGINE 15 UNITS: 100 INJECTION, SOLUTION SUBCUTANEOUS at 10:56

## 2017-05-30 RX ADMIN — Medication 10 ML: at 21:13

## 2017-05-30 RX ADMIN — INSULIN LISPRO 3 UNITS: 100 INJECTION, SOLUTION INTRAVENOUS; SUBCUTANEOUS at 12:13

## 2017-05-30 RX ADMIN — BACITRACIN: 50000 INJECTION, POWDER, FOR SOLUTION INTRAMUSCULAR at 08:25

## 2017-05-30 RX ADMIN — INSULIN LISPRO 7 UNITS: 100 INJECTION, SOLUTION INTRAVENOUS; SUBCUTANEOUS at 17:42

## 2017-05-30 RX ADMIN — CEFAZOLIN 3 G: 1 INJECTION, POWDER, FOR SOLUTION INTRAMUSCULAR; INTRAVENOUS; PARENTERAL at 07:29

## 2017-05-30 RX ADMIN — SODIUM CHLORIDE 50 ML/HR: 900 INJECTION, SOLUTION INTRAVENOUS at 01:00

## 2017-05-30 RX ADMIN — MIDAZOLAM HYDROCHLORIDE 1 MG: 1 INJECTION, SOLUTION INTRAMUSCULAR; INTRAVENOUS at 07:56

## 2017-05-30 RX ADMIN — INSULIN LISPRO 10 UNITS: 100 INJECTION, SOLUTION INTRAVENOUS; SUBCUTANEOUS at 17:42

## 2017-05-30 RX ADMIN — Medication 10 ML: at 14:00

## 2017-05-30 RX ADMIN — CHLORTHALIDONE 25 MG: 25 TABLET ORAL at 09:46

## 2017-05-30 RX ADMIN — LOSARTAN POTASSIUM 50 MG: 50 TABLET ORAL at 09:47

## 2017-05-30 RX ADMIN — CEFAZOLIN 3 G: 1 INJECTION, POWDER, FOR SOLUTION INTRAMUSCULAR; INTRAVENOUS; PARENTERAL at 14:06

## 2017-05-30 RX ADMIN — HYDRALAZINE HYDROCHLORIDE 25 MG: 25 TABLET, FILM COATED ORAL at 21:13

## 2017-05-30 RX ADMIN — CALCIUM CARBONATE (ANTACID) CHEW TAB 500 MG 200 MG: 500 CHEW TAB at 21:13

## 2017-05-30 RX ADMIN — HYDRALAZINE HYDROCHLORIDE 25 MG: 25 TABLET, FILM COATED ORAL at 15:27

## 2017-05-30 RX ADMIN — POLYETHYLENE GLYCOL 3350 17 G: 17 POWDER, FOR SOLUTION ORAL at 10:58

## 2017-05-30 RX ADMIN — ATORVASTATIN CALCIUM 20 MG: 20 TABLET, FILM COATED ORAL at 10:58

## 2017-05-30 RX ADMIN — Medication 10 ML: at 06:34

## 2017-05-30 RX ADMIN — FENTANYL CITRATE 25 MCG: 50 INJECTION, SOLUTION INTRAMUSCULAR; INTRAVENOUS at 07:57

## 2017-05-30 RX ADMIN — LACTULOSE 10 G: 10 SOLUTION ORAL at 10:57

## 2017-05-30 RX ADMIN — LACTULOSE 10 G: 10 SOLUTION ORAL at 17:06

## 2017-05-30 RX ADMIN — LIDOCAINE HYDROCHLORIDE 30 ML: 10 INJECTION, SOLUTION INFILTRATION; PERINEURAL at 07:57

## 2017-05-30 RX ADMIN — MIDAZOLAM HYDROCHLORIDE 1 MG: 1 INJECTION, SOLUTION INTRAMUSCULAR; INTRAVENOUS at 07:59

## 2017-05-30 RX ADMIN — Medication 10 ML: at 08:00

## 2017-05-30 RX ADMIN — INSULIN LISPRO 10 UNITS: 100 INJECTION, SOLUTION INTRAVENOUS; SUBCUTANEOUS at 12:13

## 2017-05-30 RX ADMIN — VANCOMYCIN HYDROCHLORIDE 1000 MG: 1 INJECTION, POWDER, LYOPHILIZED, FOR SOLUTION INTRAVENOUS at 08:19

## 2017-05-30 RX ADMIN — Medication 5 ML: at 10:00

## 2017-05-30 RX ADMIN — FENTANYL CITRATE 25 MCG: 50 INJECTION, SOLUTION INTRAMUSCULAR; INTRAVENOUS at 07:49

## 2017-05-30 RX ADMIN — HYDRALAZINE HYDROCHLORIDE 25 MG: 25 TABLET, FILM COATED ORAL at 09:47

## 2017-05-30 RX ADMIN — ONDANSETRON 4 MG: 2 INJECTION INTRAMUSCULAR; INTRAVENOUS at 07:36

## 2017-05-30 RX ADMIN — MIDAZOLAM HYDROCHLORIDE 1 MG: 1 INJECTION, SOLUTION INTRAMUSCULAR; INTRAVENOUS at 07:49

## 2017-05-30 RX ADMIN — SODIUM CHLORIDE 500 ML: 900 INJECTION, SOLUTION INTRAVENOUS at 07:39

## 2017-05-30 NOTE — PROGRESS NOTES
Cardiac Cath Lab Procedure Area Arrival Note:    Mary Metzger arrived to Cardiac Cath Lab, Procedure Area. Patient identifiers verified with NAME and DATE OF BIRTH. Procedure verified with patient. Consent forms verified. Allergies verified. Patient informed of procedure and plan of care. Questions answered with review. Patient voiced understanding of procedure and plan of care. Patient on cardiac monitor, non-invasive blood pressure, SPO2 monitor. On room air Placed on  O2 @ 2 lpm via nasal cannula. IV of NS on pump at 25 ml/hr. Patient status doing well without problems. Patient is A&Ox 4. Patient reports nausea. Patient medicated during procedure with orders obtained and verified by Dr. Katharina Boswell. Refer to patients Cardiac Cath Lab PROCEDURE REPORT for vital signs, assessment, status, and response during procedure, printed at end of case. Printed report on chart or scanned into chart.

## 2017-05-30 NOTE — PROGRESS NOTES
Occupational Therapy Note 1501    Orders acknowledged. Chart reviewed. PM inserted around 9 AM. Per protocol, patient is appropriate for PT evaluation 12 hours after insertion. Will follow up tomorrow as appropriate. Thanks.     Ho Roque, OTR/L

## 2017-05-30 NOTE — PROGRESS NOTES
Dr Rodrigo Cifuentes in to see pt, aware of elevated B/P, pharmacy called for B/P meds, voided on [facility]

## 2017-05-30 NOTE — PROGRESS NOTES
Transfer to [address] [place] [address] from Procedure Area    Verbal report given to Anuel Sanchez RN on Jacinto Rubio being transferred to Cardiac Cath Lab  for routine post - op   Patient is post PPI procedure. Patient stable upon transfer to . Report consisted of patients Situation, Background, Assessment and   Recommendations(SBAR). Information from the following report(s) Procedure Summary, Intake/Output, MAR and Cardiac Rhythm NSR was reviewed with the receiving nurse. Opportunity for questions and clarification was provided. Patient medicated during procedure with orders obtained and verified by Dr. Juan Truong. Refer to patient PROCEDURE REPORT for vital signs, assessment, status, and response during procedure.

## 2017-05-30 NOTE — PROCEDURES
Cardiac Procedure Note   Patient: Evan Lam  MRN: [id number]  SSN: [id number]   YOB: 1950 Age: 78 y.o.   Sex: female    Date of Procedure: 5/30/2017   Pre-procedure Diagnosis: dizziness syncope and sinus bradycardia LBBB  Post-procedure Diagnosis: same  Procedure: Permanent Pacemaker Insertion  :  Dr. Markus Montez MD    Assistant(s):  None  Anesthesia: Moderate Sedation   Estimated Blood Loss: Less than 10 mL   Specimens Removed: None  Findings: she has 2:1 AV block with LBBB when she was in EP lab  With pacer leads in  Left subclavian access   No contrast used  Complications: None   Implants: dual chamber [facility] pacer  Signed by:  Markus Montez MD  5/30/2017  9:20 AM

## 2017-05-30 NOTE — PROGRESS NOTES
Hospitalist Progress Note  Jose Lucio NP  Office: [phone]  Cell [phone]        Date of Service:  2017  NAME:  Dana Crisostomo  :  1950  MRN:  [id number]      Admission Summary: This is a 78year old female who came to the hospital for evaluation of multiple symptoms which included dizziness, syncope, abdominal pain, nausea, sob. Most of these symptoms are chronic per patient report but worse now. Past Medical History:   Diagnosis Date    [facility])     right breast ca with 12 lymph nodes involved    [facility] ([facility] [address]     Diverticulitis     Hypercholesterolemia     Hypertension     LBBB (left bundle branch block) 2017    Lymphedema of both lower extremities     Morbid obesity ([facility] [address] [facility]     Osteoarthritis 10/13/2010    [facility]in[facility] to secondary diabetes ([facility]     Thyroid lump     nodule       Interval history / Subjective:   s/p PM insertion today, needs to work with physical therapy tomorrow morning and can likely discharge. Hypertensive this am, better this afternoon. Assessment & Plan:     Syncope, recurrent  -She had a long-standing vertigo, but passing out has become frequent recently  -She was found to be bradycardiac on monitor  - s/p PM with Dr. Anurag Harmon    Left bundle branch block, condution disease. BNP is slightly elevated. Cardiac enzymes   are negative. - Echocardiogram with normal EF,moderate TR and MR  - s/p PM    Abdominal pain associated with chronic alternating diarrhea and Constipation  - She has had GI evaluation with colonoscopy for this in  the past which according to the patient, did not show anything.   - ct scan tof the abdomen and pelvis is unremarkable; however, liver enzymes are slightly elevated  - Liver US,probable hepatic steatosis  -  lft's trending back down    Dehydration  - slightly elevated BUN and creatinine and also liver enzymes, could be from the diuretics.  We will hold Lasix   and administer normal saline at 50 mL per hour for 24 hours, repeat labs. DM2 insulin dependent  - Humalog and Lantus. Accucheks. Humalog SS    Hypertension  -Clonidine stopped per cards, started on cozaar. Extreme obesity  Body mass index is 39.7 kg/(m^2). The patient is morbidly obese, is diabetic and hypertensive. She has lymphedema that limits her activities. I have counseled her on dietary measures and physical activity as much as she can. Chronic lymphedema  - follows in clinic    Code status: full  DVT prophylaxis: scd  Care Plan discussed with: Patient/Family and Nurse  Disposition: PPM placement tomorrow,anticipate d/c the following day . Hospital Problems  Date Reviewed: 1/20/2017          Codes Class Noted POA    * (Principal)Syncope ICD-10-CM: R55  ICD-9-CM: 780.2  5/27/2017 Unknown        Abdominal pain ICD-10-CM: R10.9  ICD-9-CM: 789.00  5/27/2017 Unknown        Alternating constipation and diarrhea ICD-10-CM: R19.8  ICD-9-CM: 787.99  5/27/2017 Unknown        Lymphedema ICD-10-CM: I89.0  ICD-9-CM: 457.1  5/27/2017 Unknown        LBBB (left bundle branch block) ICD-10-CM: I44.7  ICD-9-CM: 426.3  5/27/2017 Unknown                Review of Systems:   Denies cp/sob/dizziness/near syncope  + ble edema chronic    Vital Signs:    Last 24hrs VS reviewed since prior progress note. Most recent are:  Visit Vitals    /65 (BP 1 Location: Left arm, BP Patient Position: At rest;Sitting)    Pulse 64    Temp 97.8 °F (36.6 °C)    Resp 20    Ht 5' 10\" (1.778 m)    Wt 125.5 kg (276 lb 10.8 oz)    SpO2 98%    BMI 39.7 kg/m2         Intake/Output Summary (Last 24 hours) at 05/30/17 1549  Last data filed at 05/30/17 1007   Gross per 24 hour   Intake            952.5 ml   Output              425 ml   Net            527.5 ml        Physical Examination:             Constitutional:  No acute distress, cooperative, pleasant    ENT:  Oral mucous moist, oropharynx benign.  Neck supple, Resp: CTA bilaterally. No wheezing/rhonchi/rales. No accessory muscle use   CV:  Regular rhythm, normal rate, no murmurs, gallops, rubs    GI:  Soft,obese, non tender. normoactive bowel sounds, no hepatosplenomegaly     Musculoskeletal: Non pitting edema BLE    Neurologic:  Moves all extremities. AAOx3, CN II-XII reviewed     Psych:  Good insight, Not anxious nor agitated. Skin: left chest drsg c/d/i       Data Review:    Review and/or order of clinical lab test  Review and/or order of tests in the radiology section of CPT  Review and/or order of tests in the medicine section of CPT      Labs:     Recent Labs      05/29/17   0942   WBC  7.0   HGB  11.9   HCT  35.7   PLT  194     Recent Labs      05/29/17   0301  05/28/17   0233   NA  136  137   K  4.8  4.2   CL  105  105   CO2  23  24   BUN  22*  29*   CREA  1.25*  1.22*   GLU  229*  167*   CA  9.2  8.7     Recent Labs      05/29/17   0301   SGOT  50*   ALT  76   AP  115   TBILI  0.5   TP  7.5   ALB  3.1*   GLOB  4.4*     No results for input(s): INR, PTP, APTT in the last 72 hours. No lab exists for component: INREXT, INREXT   No results for input(s): FE, TIBC, PSAT, FERR in the last 72 hours. No results found for: FOL, RBCF   No results for input(s): PH, PCO2, PO2 in the last 72 hours.   Recent Labs      05/27/17 1954   CPK  101   CKNDX  2.8*   TROIQ  0.04     Lab Results   Component Value Date/Time    Cholesterol, total 242 10/19/2016 11:49 AM    HDL Cholesterol 39 10/19/2016 11:49 AM    LDL, calculated 150 10/19/2016 11:49 AM    Triglyceride 264 10/19/2016 11:49 AM     Lab Results   Component Value Date/Time    Glucose (POC) 230 05/30/2017 11:11 AM    Glucose (POC) 209 05/30/2017 09:37 AM    Glucose (POC) 256 05/29/2017 09:26 PM    Glucose (POC) 200 05/29/2017 04:40 PM    Glucose (POC) 227 05/29/2017 11:43 AM     Lab Results   Component Value Date/Time    Color YELLOW/STRAW 05/27/2017 08:45 AM    Appearance CLEAR 05/27/2017 08:45 AM    Specific gravity 1.015 05/27/2017 08:45 AM    Specific gravity 1.014 01/13/2017 01:33 PM    pH (UA) 5.0 05/27/2017 08:45 AM    Protein 100 05/27/2017 08:45 AM    Glucose 500 05/27/2017 08:45 AM    Ketone NEGATIVE  05/27/2017 08:45 AM    Bilirubin NEGATIVE  05/27/2017 08:45 AM    Urobilinogen 0.2 05/27/2017 08:45 AM    Nitrites NEGATIVE  05/27/2017 08:45 AM    Leukocyte Esterase TRACE 05/27/2017 08:45 AM    Epithelial cells MODERATE 05/27/2017 08:45 AM    Bacteria NEGATIVE  05/27/2017 08:45 AM    WBC 20-50 05/27/2017 08:45 AM    RBC 5-10 05/27/2017 08:45 AM         Medications Reviewed:     Current Facility-Administered Medications   Medication Dose Route Frequency    sodium chloride (NS) flush 5-10 mL  5-10 mL IntraVENous Q8H    sodium chloride (NS) flush 5-10 mL  5-10 mL IntraVENous PRN    naloxone (NARCAN) injection 0.4 mg  0.4 mg IntraVENous PRN    ceFAZolin (ANCEF) 3 g in 0.9%  ml IVPB  3 g IntraVENous Q8H    hydrALAZINE (APRESOLINE) tablet 25 mg  25 mg Oral TID    lactulose (CHRONULAC) solution 10 g  10 g Oral BID    polyethylene glycol (MIRALAX) packet 17 g  17 g Oral DAILY    insulin glargine (LANTUS) injection 15 Units  15 Units SubCUTAneous ACB/HS    atorvastatin (LIPITOR) tablet 20 mg  20 mg Oral DAILY    insulin lispro (HUMALOG) injection 10 Units  10 Units SubCUTAneous TIDAC    sodium chloride (NS) flush 5-10 mL  5-10 mL IntraVENous Q8H    sodium chloride (NS) flush 5-10 mL  5-10 mL IntraVENous PRN    acetaminophen (TYLENOL) tablet 650 mg  650 mg Oral Q4H PRN    HYDROcodone-acetaminophen (NORCO) 5-325 mg per tablet 1 Tab  1 Tab Oral Q4H PRN    insulin lispro (HUMALOG) injection   SubCUTAneous AC&HS    glucose chewable tablet 16 g  4 Tab Oral PRN    glucagon (GLUCAGEN) injection 1 mg  1 mg IntraMUSCular PRN    0.9% sodium chloride infusion  50 mL/hr IntraVENous CONTINUOUS    dextrose 10 % infusion 125-250 mL  125-250 mL IntraVENous PRN    cefTRIAXone (ROCEPHIN) 1 g in 0.9% sodium chloride (MBP/ADV) 50 mL  1 g IntraVENous Q24H    losartan (COZAAR) tablet 50 mg  50 mg Oral DAILY    chlorthalidone (HYGROTEN) tablet 25 mg  25 mg Oral DAILY    ondansetron (ZOFRAN) injection 4 mg  4 mg IntraVENous Q4H PRN     ______________________________________________________________________  EXPECTED LENGTH OF STAY: 2d 12h  ACTUAL LENGTH OF STAY:          3                 Keerthi Sharma V NP

## 2017-05-30 NOTE — PROGRESS NOTES
TRANSFER - OUT REPORT:    Verbal report given to branden ZHU(name) on [facility]  being transferred to NSTU(unit) for routine progression of care       Report consisted of patients Situation, Background, Assessment and   Recommendations(SBAR). Information from the following report(s) Procedure Summary, Intake/Output, MAR, Med Rec Status and Cardiac Rhythm V paced was reviewed with the receiving nurse. Lines:   Peripheral IV 05/27/17 Left Antecubital (Active)   Site Assessment Clean, dry, & intact 5/29/2017  4:00 PM   Phlebitis Assessment 0 5/29/2017  4:00 PM   Infiltration Assessment 0 5/29/2017  4:00 PM   Dressing Status Clean, dry, & intact 5/29/2017  4:00 PM   Dressing Type Transparent;Tape 5/29/2017  4:00 PM   Hub Color/Line Status Infusing;Pink 5/29/2017  4:00 PM   Action Taken Open ports on tubing capped 5/29/2017  4:00 PM   Alcohol Cap Used Yes 5/29/2017  4:00 PM       Peripheral IV 05/29/17 Left Wrist (Active)        Opportunity for questions and clarification was provided.       Patient transported with:   Monitor  Registered Nurse

## 2017-05-30 NOTE — PROGRESS NOTES
TRANSFER - IN REPORT:    Verbal report received from [facility]) on Dana Crisostomo  being received from [employer]) for routine post - op      Report consisted of patients Situation, Background, Assessment and   Recommendations(SBAR). Information from the following report(s) SBAR and Procedure Summary was reviewed with the receiving nurse. Opportunity for questions and clarification was provided. Assessment completed upon patients arrival to unit and care assumed.

## 2017-05-30 NOTE — PROGRESS NOTES
Orders acknowledged. Chart reviewed. PM inserted around 9 AM. Per protocol, patient is appropriate for PT evaluation 12 hours after insertion. Will follow up tomorrow as appropriate. Thanks!     Sharon Phillips PT, DPT

## 2017-05-30 NOTE — PROGRESS NOTES
TRANSFER - IN REPORT:    Verbal report received from [facility] on Chao Koenig  being received from Procedure for routine progression of care. Report consisted of patients Situation, Background, Assessment and Recommendations(SBAR). Information from the following report(s) Procedure Summary, MAR and Recent Results was reviewed with the receiving clinician. Opportunity for questions and clarification was provided. Assessment completed upon patients arrival to [address] [place] [address] and care assumed. Cardiac Cath Lab Recovery Arrival Note:    Chao Koenig arrived to [facility] recovery area. Patient procedure= PPI /DDDR . Patient on cardiac monitor, non-invasive blood pressure, SPO2 monitor. On room air. IV  of Nacl on pump at 25 ml/hr. Patient status doing well without problems. Patient is A&Ox 4. Patient reports no complaints. PROCEDURE SITE CHECK:    Procedure site:without any bleeding and or hematoma, no pain/discomfort reported at procedure site. No change in patient status. Continue to monitor patient and status.     855 aquacel surgical dsg applied  900 ice bag to left chest wall

## 2017-05-30 NOTE — PROGRESS NOTES
Cardiology Progress Note         5/30/2017 10:05 AM    Admit Date: 5/27/2017    Admit Diagnosis: Syncope; Abdominal pain;LBBB (left bundle branch block)      Subjective:     [place] SSS s/p pacemaker. No chest pain. Breathing ok. Past Medical History:   Diagnosis Date    Cancer [facility])     right breast ca with 12 lymph nodes involved    Diabetes ([facility] [address]     Diverticulitis     Hypercholesterolemia     Hypertension     LBBB (left bundle branch block) 5/27/2017    Lymphedema of both lower extremities     Morbid obesity ([address]     Osteoarthritis 10/13/2010    Retinopathy due to secondary diabetes ([facility] [address]     Thyroid lump     nodule     Past Surgical History:   Procedure Laterality Date    COLONOSCOPY N/A 8/24/2016    COLONOSCOPY performed by Luis Alfredo Santos MD at [facility] ENDOSCOPY    HX CATARACT REMOVAL      HX CHOLECYSTECTOMY      HX GYN      hysterectomy    HX HYSTERECTOMY      HX MASTECTOMY      HX REFRACTIVE SURGERY      HX RETINAL DETACHMENT REPAIR       Social History     Social History    Marital status:      Spouse name: N/A    Number of children: N/A    Years of education: N/A     Occupational History    Not on file.      Social History Main Topics    Smoking status: Never Smoker    Smokeless tobacco: Never Used    Alcohol use No    Drug use: No    Sexual activity: Yes     Partners: Male     Other Topics Concern    Not on file     Social History Narrative    ** Merged History Encounter **              Visit Vitals    /75 (BP 1 Location: Left arm, BP Patient Position: At rest)    Pulse 94    Temp 97.9 °F (36.6 °C)    Resp 20    Wt 276 lb 10.8 oz (125.5 kg)    SpO2 97%    BMI 39.7 kg/m2     Current Facility-Administered Medications   Medication Dose Route Frequency    sodium chloride (NS) flush 5-10 mL  5-10 mL IntraVENous Q8H    sodium chloride (NS) flush 5-10 mL  5-10 mL IntraVENous PRN    naloxone (NARCAN) injection 0.4 mg  0.4 mg IntraVENous PRN    ceFAZolin (ANCEF) 1 g in 0.9% sodium chloride (MBP/ADV) 50 mL  1 g IntraVENous Q8H    sodium chloride (NS) flush 5-10 mL  5-10 mL IntraVENous Q8H    hydrALAZINE (APRESOLINE) tablet 25 mg  25 mg Oral TID    lactulose (CHRONULAC) solution 10 g  10 g Oral BID    polyethylene glycol (MIRALAX) packet 17 g  17 g Oral DAILY    insulin glargine (LANTUS) injection 15 Units  15 Units SubCUTAneous ACB/HS    atorvastatin (LIPITOR) tablet 20 mg  20 mg Oral DAILY    insulin lispro (HUMALOG) injection 10 Units  10 Units SubCUTAneous TIDAC    sodium chloride (NS) flush 5-10 mL  5-10 mL IntraVENous Q8H    sodium chloride (NS) flush 5-10 mL  5-10 mL IntraVENous PRN    acetaminophen (TYLENOL) tablet 650 mg  650 mg Oral Q4H PRN    HYDROcodone-acetaminophen (NORCO) 5-325 mg per tablet 1 Tab  1 Tab Oral Q4H PRN    insulin lispro (HUMALOG) injection   SubCUTAneous AC&HS    glucose chewable tablet 16 g  4 Tab Oral PRN    glucagon (GLUCAGEN) injection 1 mg  1 mg IntraMUSCular PRN    0.9% sodium chloride infusion  50 mL/hr IntraVENous CONTINUOUS    dextrose 10 % infusion 125-250 mL  125-250 mL IntraVENous PRN    cefTRIAXone (ROCEPHIN) 1 g in 0.9% sodium chloride (MBP/ADV) 50 mL  1 g IntraVENous Q24H    losartan (COZAAR) tablet 50 mg  50 mg Oral DAILY    chlorthalidone (HYGROTEN) tablet 25 mg  25 mg Oral DAILY    ondansetron (ZOFRAN) injection 4 mg  4 mg IntraVENous Q4H PRN         Objective:      Physical Exam:  Visit Vitals    /75 (BP 1 Location: Left arm, BP Patient Position: At rest)    Pulse 94    Temp 97.9 °F (36.6 °C)    Resp 20    Wt 276 lb 10.8 oz (125.5 kg)    SpO2 97%    BMI 39.7 kg/m2     General Appearance:  no acute distress. Ears/Nose/Mouth/Throat:   Hearing grossly normal.         Neck: Supple. Chest:   Lungs with scattered crackles to auscultation bilaterally. Cardiovascular:  Irregular rhythm and slow rate, no murmur.    Abdomen:   Soft, morbidly obese   Extremities: 3+ edema bilaterally. Skin: Warm and dry. Data Review:   Labs:    Recent Results (from the past 24 hour(s))   GLUCOSE, POC    Collection Time: 05/29/17 11:43 AM   Result Value Ref Range    Glucose (POC) 227 (H) 65 - 100 mg/dL    Performed by Queenie Black    GLUCOSE, POC    Collection Time: 05/29/17  4:40 PM   Result Value Ref Range    Glucose (POC) 200 (H) 65 - 100 mg/dL    Performed by Arlette Andrews    GLUCOSE, POC    Collection Time: 05/29/17  9:26 PM   Result Value Ref Range    Glucose (POC) 256 (H) 65 - 100 mg/dL    Performed by Mena Graham    GLUCOSE, POC    Collection Time: 05/30/17  9:37 AM   Result Value Ref Range    Glucose (POC) 209 (H) 65 - 100 mg/dL    Performed by Shauna Levy        Telemetry: sinus bradycardia LBBB       Assessment:     Principal Problem:    Syncope (5/27/2017)    Active Problems:    Abdominal pain (5/27/2017)      Alternating constipation and diarrhea (5/27/2017)      Lymphedema (5/27/2017)      LBBB (left bundle branch block) (5/27/2017)    sinus bradycardia  PACs    Plan:   SSS s/p pacemaker. Dr. Fanta Pham efforts much appreciated. Initial f/u per EP. Will follow after outpt. HTN. Elevated. Receiving AM meds now. Dizziness. From bradycardia. Lymphedema.

## 2017-05-30 NOTE — DIABETES MGMT
DTC Progress Note    Recommendations/ Comments: If appropriate, please consider Modifying her diet order to include Consistent Carbs. Patient has required 9 units of correction insulin over the last 24 hours. also [please consider increasing her Lantus insulin to 20 units q 12. Chart reviewed on Isael Cannon. Patient is a 78 y.o. female with history Type 2 Diabetes on oral agents (dual therapy): glimepiride (Amaryl), metformin (generic)  insulin injections: Humalog : 10 units ac meals, Lantus : 15 units q 12 at home. A1c:   Lab Results   Component Value Date/Time    Hemoglobin A1c 8.9 05/27/2017 10:11 AM    Hemoglobin A1c 8.7 10/19/2016 11:49 AM       Recent Glucose Results: Lab Results   Component Value Date/Time    GLUCPOC 230 (H) 05/30/2017 11:11 AM    GLUCPOC 209 (H) 05/30/2017 09:37 AM    GLUCPOC 256 (H) 05/29/2017 09:26 PM        Lab Results   Component Value Date/Time    Creatinine 1.25 05/29/2017 03:01 AM     Estimated Creatinine Clearance: 62.9 mL/min (based on Cr of 1.25). Active Orders   Diet    DIET CARDIAC Regular        PO intake: Patient Vitals for the past 72 hrs:   % Diet Eaten   05/28/17 1200 100 %   05/28/17 0800 100 %       Current hospital DM medication: Lantus 15 units q 12, Humalog 10 units ac meals,    Will continue to follow as needed.     Thank you  Manuel Neely RD,CDE   [address]

## 2017-05-30 NOTE — INTERDISCIPLINARY ROUNDS
IDR/SLIDR Summary          Patient: Dana Crisostomo MRN: [id number]    Age: 78 y.o. YOB: 1950 Room/Bed: CCL/PL   Admit Diagnosis: Syncope  Abdominal pain  LBBB (left bundle branch block)  Principal Diagnosis: Syncope   Goals: PM placement today  Readmission: NO  Quality Measure: Not applicable  VTE Prophylaxis: Mechanical  Influenza Vaccine screening completed? YES  Pneumococcal Vaccine screening completed? NO  Mobility needs: No   Nutrition plan:No  Consults: P. T    Financial concerns:No  Escalated to CM? NO  RRAT Score:      Interventions:  Testing due for pt today?  YES  LOS: 3 days Expected length of stay 4 days  Discharge plan: Home   PCP: Zan Deal MD  Transportation needs: Yes    Days before discharge:one day until discharge   Discharge disposition: Home    Signed:     Simón Barrios RN  5/30/2017  9:00 AM

## 2017-05-30 NOTE — PROGRESS NOTES
Bedside shift change report given to [facility] [address] (oncoming nurse) by Johnathon Jhaveri (offgoing nurse). Report included the following information SBAR, Intake/Output, MAR, Recent Results and Cardiac Rhythm Paced.

## 2017-05-31 ENCOUNTER — APPOINTMENT (OUTPATIENT)
Dept: GENERAL RADIOLOGY | Age: 67
DRG: 243 | End: 2017-05-31
Attending: INTERNAL MEDICINE
Payer: MEDICARE

## 2017-05-31 LAB
ANION GAP BLD CALC-SCNC: 8 MMOL/L (ref 5–15)
BUN SERPL-MCNC: 19 MG/DL (ref 6–20)
BUN/CREAT SERPL: 19 (ref 12–20)
CALCIUM SERPL-MCNC: 9.3 MG/DL (ref 8.5–10.1)
CHLORIDE SERPL-SCNC: 107 MMOL/L (ref 97–108)
CO2 SERPL-SCNC: 20 MMOL/L (ref 21–32)
CREAT SERPL-MCNC: 1 MG/DL (ref 0.55–1.02)
GLUCOSE BLD STRIP.AUTO-MCNC: 149 MG/DL (ref 65–100)
GLUCOSE BLD STRIP.AUTO-MCNC: 164 MG/DL (ref 65–100)
GLUCOSE BLD STRIP.AUTO-MCNC: 176 MG/DL (ref 65–100)
GLUCOSE BLD STRIP.AUTO-MCNC: 199 MG/DL (ref 65–100)
GLUCOSE BLD STRIP.AUTO-MCNC: 211 MG/DL (ref 65–100)
GLUCOSE SERPL-MCNC: 140 MG/DL (ref 65–100)
POTASSIUM SERPL-SCNC: 4.4 MMOL/L (ref 3.5–5.1)
SERVICE CMNT-IMP: ABNORMAL
SODIUM SERPL-SCNC: 135 MMOL/L (ref 136–145)

## 2017-05-31 PROCEDURE — 36415 COLL VENOUS BLD VENIPUNCTURE: CPT

## 2017-05-31 PROCEDURE — 74011250636 HC RX REV CODE- 250/636: Performed by: INTERNAL MEDICINE

## 2017-05-31 PROCEDURE — 77030036560 HC SHLDR ARM PAD ABDUCTN S2SG -B

## 2017-05-31 PROCEDURE — 77030031139 HC SUT VCRL2 J&J -A

## 2017-05-31 PROCEDURE — 77030028698 HC BLD TISS PLSM MEDT -D

## 2017-05-31 PROCEDURE — 74011000250 HC RX REV CODE- 250: Performed by: INTERNAL MEDICINE

## 2017-05-31 PROCEDURE — 77030018729 HC ELECTRD DEFIB PAD CARD -B

## 2017-05-31 PROCEDURE — 71010 XR CHEST PORT: CPT

## 2017-05-31 PROCEDURE — 74011250636 HC RX REV CODE- 250/636: Performed by: NURSE PRACTITIONER

## 2017-05-31 PROCEDURE — 74011250637 HC RX REV CODE- 250/637: Performed by: HOSPITALIST

## 2017-05-31 PROCEDURE — 82962 GLUCOSE BLOOD TEST: CPT

## 2017-05-31 PROCEDURE — 74011250637 HC RX REV CODE- 250/637: Performed by: INTERNAL MEDICINE

## 2017-05-31 PROCEDURE — 77030018547 HC SUT ETHBND1 J&J -B

## 2017-05-31 PROCEDURE — 74011000272 HC RX REV CODE- 272: Performed by: INTERNAL MEDICINE

## 2017-05-31 PROCEDURE — 02WA3MZ REVISION OF CARDIAC LEAD IN HEART, PERCUTANEOUS APPROACH: ICD-10-PCS | Performed by: INTERNAL MEDICINE

## 2017-05-31 PROCEDURE — 71020 XR CHEST PA LAT: CPT

## 2017-05-31 PROCEDURE — 74011250637 HC RX REV CODE- 250/637: Performed by: NURSE PRACTITIONER

## 2017-05-31 PROCEDURE — 65660000000 HC RM CCU STEPDOWN

## 2017-05-31 PROCEDURE — 74011636637 HC RX REV CODE- 636/637: Performed by: HOSPITALIST

## 2017-05-31 PROCEDURE — 77030037029 HC IMPL ENV ICD ANTIBACT ABSRB TYRX MEDT -G

## 2017-05-31 PROCEDURE — 80048 BASIC METABOLIC PNL TOTAL CA: CPT

## 2017-05-31 PROCEDURE — 99152 MOD SED SAME PHYS/QHP 5/>YRS: CPT

## 2017-05-31 PROCEDURE — 77030011640 HC PAD GRND REM COVD -A

## 2017-05-31 RX ORDER — HYDRALAZINE HYDROCHLORIDE 25 MG/1
25 TABLET, FILM COATED ORAL 3 TIMES DAILY
Qty: 90 TAB | Refills: 0 | Status: SHIPPED | OUTPATIENT
Start: 2017-05-31 | End: 2017-06-01

## 2017-05-31 RX ORDER — CEPHALEXIN 500 MG/1
500 CAPSULE ORAL 3 TIMES DAILY
Status: DISCONTINUED | OUTPATIENT
Start: 2017-05-31 | End: 2017-05-31

## 2017-05-31 RX ORDER — HYDRALAZINE HYDROCHLORIDE 20 MG/ML
20 INJECTION INTRAMUSCULAR; INTRAVENOUS ONCE
Status: COMPLETED | OUTPATIENT
Start: 2017-05-31 | End: 2017-05-31

## 2017-05-31 RX ORDER — ATROPINE SULFATE 0.1 MG/ML
1 INJECTION INTRAVENOUS AS NEEDED
Status: DISCONTINUED | OUTPATIENT
Start: 2017-05-31 | End: 2017-05-31

## 2017-05-31 RX ORDER — FENTANYL CITRATE 50 UG/ML
25-200 INJECTION, SOLUTION INTRAMUSCULAR; INTRAVENOUS
Status: DISCONTINUED | OUTPATIENT
Start: 2017-05-31 | End: 2017-05-31

## 2017-05-31 RX ORDER — LOSARTAN POTASSIUM 50 MG/1
50 TABLET ORAL DAILY
Qty: 30 TAB | Refills: 0 | Status: SHIPPED | OUTPATIENT
Start: 2017-05-31 | End: 2017-06-01

## 2017-05-31 RX ORDER — VANCOMYCIN HYDROCHLORIDE 1 G/20ML
1000 INJECTION, POWDER, LYOPHILIZED, FOR SOLUTION INTRAVENOUS ONCE
Status: DISCONTINUED | OUTPATIENT
Start: 2017-05-31 | End: 2017-05-31

## 2017-05-31 RX ORDER — SODIUM CHLORIDE 0.9 % (FLUSH) 0.9 %
5-10 SYRINGE (ML) INJECTION EVERY 8 HOURS
Status: DISCONTINUED | OUTPATIENT
Start: 2017-05-31 | End: 2017-06-01 | Stop reason: HOSPADM

## 2017-05-31 RX ORDER — LIDOCAINE HYDROCHLORIDE 10 MG/ML
10-40 INJECTION INFILTRATION; PERINEURAL AS NEEDED
Status: DISCONTINUED | OUTPATIENT
Start: 2017-05-31 | End: 2017-05-31

## 2017-05-31 RX ORDER — NALOXONE HYDROCHLORIDE 0.4 MG/ML
0.4 INJECTION, SOLUTION INTRAMUSCULAR; INTRAVENOUS; SUBCUTANEOUS AS NEEDED
Status: DISCONTINUED | OUTPATIENT
Start: 2017-05-31 | End: 2017-06-01 | Stop reason: HOSPADM

## 2017-05-31 RX ORDER — VANCOMYCIN 2 GRAM/500 ML IN 0.9 % SODIUM CHLORIDE INTRAVENOUS
2000 ONCE
Status: COMPLETED | OUTPATIENT
Start: 2017-05-31 | End: 2017-05-31

## 2017-05-31 RX ORDER — CEPHALEXIN 500 MG/1
500 CAPSULE ORAL 3 TIMES DAILY
Qty: 15 CAP | Refills: 0 | Status: SHIPPED | OUTPATIENT
Start: 2017-06-01 | End: 2017-06-01

## 2017-05-31 RX ORDER — SODIUM CHLORIDE 0.9 % (FLUSH) 0.9 %
5-10 SYRINGE (ML) INJECTION AS NEEDED
Status: DISCONTINUED | OUTPATIENT
Start: 2017-05-31 | End: 2017-06-01 | Stop reason: HOSPADM

## 2017-05-31 RX ORDER — MIDAZOLAM HYDROCHLORIDE 1 MG/ML
.5-1 INJECTION, SOLUTION INTRAMUSCULAR; INTRAVENOUS
Status: DISCONTINUED | OUTPATIENT
Start: 2017-05-31 | End: 2017-05-31

## 2017-05-31 RX ORDER — CEPHALEXIN 500 MG/1
500 CAPSULE ORAL 3 TIMES DAILY
Status: DISCONTINUED | OUTPATIENT
Start: 2017-06-01 | End: 2017-06-01 | Stop reason: HOSPADM

## 2017-05-31 RX ORDER — SODIUM CHLORIDE 9 MG/ML
500 INJECTION, SOLUTION INTRAVENOUS ONCE
Status: COMPLETED | OUTPATIENT
Start: 2017-05-31 | End: 2017-05-31

## 2017-05-31 RX ORDER — SODIUM CHLORIDE 0.9 % (FLUSH) 0.9 %
5-10 SYRINGE (ML) INJECTION AS NEEDED
Status: DISCONTINUED | OUTPATIENT
Start: 2017-05-31 | End: 2017-05-31

## 2017-05-31 RX ORDER — CEPHALEXIN 500 MG/1
500 CAPSULE ORAL 3 TIMES DAILY
Status: DISCONTINUED | OUTPATIENT
Start: 2017-06-01 | End: 2017-05-31

## 2017-05-31 RX ADMIN — HYDROCODONE BITARTRATE AND ACETAMINOPHEN 1 TABLET: 5; 325 TABLET ORAL at 03:25

## 2017-05-31 RX ADMIN — HYDRALAZINE HYDROCHLORIDE 20 MG: 20 INJECTION INTRAMUSCULAR; INTRAVENOUS at 15:00

## 2017-05-31 RX ADMIN — MIDAZOLAM HYDROCHLORIDE 2 MG: 1 INJECTION, SOLUTION INTRAMUSCULAR; INTRAVENOUS at 14:32

## 2017-05-31 RX ADMIN — LOSARTAN POTASSIUM 50 MG: 50 TABLET ORAL at 10:10

## 2017-05-31 RX ADMIN — CEPHALEXIN 500 MG: 500 CAPSULE ORAL at 10:10

## 2017-05-31 RX ADMIN — Medication 10 ML: at 07:03

## 2017-05-31 RX ADMIN — CHLORTHALIDONE 25 MG: 25 TABLET ORAL at 10:10

## 2017-05-31 RX ADMIN — INSULIN LISPRO 10 UNITS: 100 INJECTION, SOLUTION INTRAVENOUS; SUBCUTANEOUS at 17:49

## 2017-05-31 RX ADMIN — CEFAZOLIN 3 G: 1 INJECTION, POWDER, FOR SOLUTION INTRAMUSCULAR; INTRAVENOUS; PARENTERAL at 14:22

## 2017-05-31 RX ADMIN — Medication 10 ML: at 14:41

## 2017-05-31 RX ADMIN — VANCOMYCIN HYDROCHLORIDE 2000 MG: 10 INJECTION, POWDER, LYOPHILIZED, FOR SOLUTION INTRAVENOUS at 21:58

## 2017-05-31 RX ADMIN — INSULIN LISPRO 3 UNITS: 100 INJECTION, SOLUTION INTRAVENOUS; SUBCUTANEOUS at 12:21

## 2017-05-31 RX ADMIN — INSULIN LISPRO 2 UNITS: 100 INJECTION, SOLUTION INTRAVENOUS; SUBCUTANEOUS at 17:49

## 2017-05-31 RX ADMIN — LIDOCAINE HYDROCHLORIDE 30 ML: 10 INJECTION, SOLUTION INFILTRATION; PERINEURAL at 14:37

## 2017-05-31 RX ADMIN — CEFAZOLIN 3 G: 1 INJECTION, POWDER, FOR SOLUTION INTRAMUSCULAR; INTRAVENOUS; PARENTERAL at 00:24

## 2017-05-31 RX ADMIN — Medication 10 ML: at 00:24

## 2017-05-31 RX ADMIN — LACTULOSE 10 G: 10 SOLUTION ORAL at 17:31

## 2017-05-31 RX ADMIN — INSULIN GLARGINE 15 UNITS: 100 INJECTION, SOLUTION SUBCUTANEOUS at 07:03

## 2017-05-31 RX ADMIN — INSULIN LISPRO 2 UNITS: 100 INJECTION, SOLUTION INTRAVENOUS; SUBCUTANEOUS at 10:11

## 2017-05-31 RX ADMIN — Medication 10 ML: at 14:00

## 2017-05-31 RX ADMIN — SODIUM CHLORIDE 50000 UNITS: 900 IRRIGANT IRRIGATION at 14:51

## 2017-05-31 RX ADMIN — FENTANYL CITRATE 50 MCG: 50 INJECTION, SOLUTION INTRAMUSCULAR; INTRAVENOUS at 14:33

## 2017-05-31 RX ADMIN — Medication 10 ML: at 17:32

## 2017-05-31 RX ADMIN — POLYETHYLENE GLYCOL 3350 17 G: 17 POWDER, FOR SOLUTION ORAL at 10:11

## 2017-05-31 RX ADMIN — MIDAZOLAM HYDROCHLORIDE 2 MG: 1 INJECTION, SOLUTION INTRAMUSCULAR; INTRAVENOUS at 14:40

## 2017-05-31 RX ADMIN — INSULIN GLARGINE 15 UNITS: 100 INJECTION, SOLUTION SUBCUTANEOUS at 22:13

## 2017-05-31 RX ADMIN — ATORVASTATIN CALCIUM 20 MG: 20 TABLET, FILM COATED ORAL at 10:10

## 2017-05-31 RX ADMIN — FENTANYL CITRATE 50 MCG: 50 INJECTION, SOLUTION INTRAMUSCULAR; INTRAVENOUS at 14:41

## 2017-05-31 RX ADMIN — HYDRALAZINE HYDROCHLORIDE 25 MG: 25 TABLET, FILM COATED ORAL at 17:31

## 2017-05-31 RX ADMIN — HYDRALAZINE HYDROCHLORIDE 25 MG: 25 TABLET, FILM COATED ORAL at 21:58

## 2017-05-31 RX ADMIN — INSULIN LISPRO 10 UNITS: 100 INJECTION, SOLUTION INTRAVENOUS; SUBCUTANEOUS at 10:11

## 2017-05-31 RX ADMIN — LACTULOSE 10 G: 10 SOLUTION ORAL at 10:11

## 2017-05-31 RX ADMIN — SODIUM CHLORIDE 500 ML: 900 INJECTION, SOLUTION INTRAVENOUS at 14:30

## 2017-05-31 RX ADMIN — HYDRALAZINE HYDROCHLORIDE 25 MG: 25 TABLET, FILM COATED ORAL at 10:10

## 2017-05-31 NOTE — PROGRESS NOTES
Occupational Therapy  Chart reviewed. Xray reveals RA lead out of place, plan for repositioning at 3:30. Will hold evaluation, follow up tomorrow for evaluation.    Marj Patel OTR/L

## 2017-05-31 NOTE — PROGRESS NOTES
Hospitalist Progress Note  Neli Metz NP  Office: [phone]  Cell [phone]        Date of Service:  2017  NAME:  Elver Cortez  :  1950  MRN:  [id number]      Admission Summary: This is a 78year old female who came to the hospital for evaluation of multiple symptoms which included dizziness, syncope, abdominal pain, nausea, sob. Most of these symptoms are chronic per patient report but worse now. Past Medical History:   Diagnosis Date    Cancer [facility])     right breast ca with 12 lymph nodes involved    Diabetes ([facility] [address]     Diverticulitis     Hypercholesterolemia     Hypertension     LBBB (left bundle branch block) 2017    Lymphedema of both lower extremities     Morbid obesity ([facility] [address]     Osteoarthritis 10/13/2010    Retinopathy due to secondary diabetes ([facility]     Thyroid lump     nodule       Interval history / Subjective: Today's cxr shows right atrial lead has moved position. Will have this repositioned at 330pm with Dr. Hannah Kramer. BP better today. Can likely d/c tomorrow after pacer recheck. Assessment & Plan:     Syncope, recurrent  -She had a long-standing vertigo, but passing out has become frequent recently  -She was found to be bradycardiac on monitor  - s/p PM with Dr. Hannah Kramer    Left bundle branch block, condution disease. BNP is slightly elevated. Cardiac enzymes   are negative.    - Echocardiogram with normal EF,moderate TR and MR  - s/p PM    Abdominal pain associated with chronic alternating diarrhea and Constipation  - She has had GI evaluation with colonoscopy for this in  the past which according to the patient, did not show anything.   - ct scan tof the abdomen and pelvis is unremarkable; however, liver enzymes are slightly elevated  - Liver US,probable hepatic steatosis  -  lft's trending back down    Dehydration  - slightly elevated BUN and creatinine and also liver enzymes, could be from the diuretics. We will hold Lasix   and administer normal saline at 50 mL per hour for 24 hours, repeat labs. DM2 insulin dependent  - Humalog and Lantus. Accucheks. Humalog SS    Hypertension  -Clonidine stopped per cards, started on cozaar. Extreme obesity  Body mass index is 40.63 kg/(m^2). The patient is morbidly obese, is diabetic and hypertensive. She has lymphedema that limits her activities. I have counseled her on dietary measures and physical activity as much as she can. Chronic lymphedema  - follows in clinic    Code status: full  DVT prophylaxis: scd  Care Plan discussed with: Patient/Family and Nurse  Disposition: anticipate d/c tomorrow to home     Hospital Problems  Date Reviewed: 1/20/2017          Codes Class Noted POA    * (Principal)Syncope ICD-10-CM: R55  ICD-9-CM: 780.2  5/27/2017 Unknown        Abdominal pain ICD-10-CM: R10.9  ICD-9-CM: 789.00  5/27/2017 Unknown        Alternating constipation and diarrhea ICD-10-CM: R19.8  ICD-9-CM: 787.99  5/27/2017 Unknown        Lymphedema ICD-10-CM: I89.0  ICD-9-CM: 457.1  5/27/2017 Unknown        LBBB (left bundle branch block) ICD-10-CM: I44.7  ICD-9-CM: 426.3  5/27/2017 Unknown                Review of Systems:   Denies cp/sob/dizziness/near syncope  + ble edema chronic    Vital Signs:    Last 24hrs VS reviewed since prior progress note. Most recent are:  Visit Vitals    /59 (BP 1 Location: Left arm, BP Patient Position: At rest;Sitting)    Pulse 63    Temp 97.2 °F (36.2 °C)    Resp 16    Ht 5' 10\" (1.778 m)    Wt 128.5 kg (283 lb 3.2 oz)    SpO2 94%    BMI 40.63 kg/m2         Intake/Output Summary (Last 24 hours) at 05/31/17 1400  Last data filed at 05/30/17 1600   Gross per 24 hour   Intake                0 ml   Output              250 ml   Net             -250 ml        Physical Examination:             Constitutional:  No acute distress, cooperative, pleasant    ENT:  Oral mucous moist, oropharynx benign.  Neck supple Resp: CTA bilaterally. No wheezing/rhonchi/rales. No accessory muscle use   CV:  Regular rhythm, normal rate, no murmurs, gallops, rubs    GI:  Soft,obese, non tender. normoactive bowel sounds, no hepatosplenomegaly     Musculoskeletal: Non pitting edema BLE 2/2 chronic lymphedema    Neurologic:  Moves all extremities. AAOx3, CN II-XII reviewed     Psych:  Good insight, Not anxious nor agitated. Skin: left chest drsg c/d/i       Data Review:    Review and/or order of clinical lab test  Review and/or order of tests in the radiology section of CPT  Review and/or order of tests in the medicine section of CPT      Labs:     Recent Labs      05/29/17   0942   WBC  7.0   HGB  11.9   HCT  35.7   PLT  194     Recent Labs      05/31/17   0349  05/29/17   0301   NA  135*  136   K  4.4  4.8   CL  107  105   CO2  20*  23   BUN  19  22*   CREA  1.00  1. 25*   GLU  140*  229*   CA  9.3  9.2     Recent Labs      05/29/17   0301   SGOT  50*   ALT  76   AP  115   TBILI  0.5   TP  7.5   ALB  3.1*   GLOB  4.4*     No results for input(s): INR, PTP, APTT in the last 72 hours. No lab exists for component: INREXT, INREXT   No results for input(s): FE, TIBC, PSAT, FERR in the last 72 hours. No results found for: FOL, RBCF   No results for input(s): PH, PCO2, PO2 in the last 72 hours. No results for input(s): CPK, CKNDX, TROIQ in the last 72 hours.     No lab exists for component: CPKMB  Lab Results   Component Value Date/Time    Cholesterol, total 242 10/19/2016 11:49 AM    HDL Cholesterol 39 10/19/2016 11:49 AM    LDL, calculated 150 10/19/2016 11:49 AM    Triglyceride 264 10/19/2016 11:49 AM     Lab Results   Component Value Date/Time    Glucose (POC) 211 05/31/2017 11:15 AM    Glucose (POC) 176 05/31/2017 10:02 AM    Glucose (POC) 149 05/31/2017 07:04 AM    Glucose (POC) 142 05/30/2017 09:35 PM    Glucose (POC) 315 05/30/2017 05:13 PM     Lab Results   Component Value Date/Time    Color YELLOW/STRAW 05/27/2017 08:45 AM    Appearance CLEAR 05/27/2017 08:45 AM    Specific gravity 1.015 05/27/2017 08:45 AM    Specific gravity 1.014 01/13/2017 01:33 PM    pH (UA) 5.0 05/27/2017 08:45 AM    Protein 100 05/27/2017 08:45 AM    Glucose 500 05/27/2017 08:45 AM    Ketone NEGATIVE  05/27/2017 08:45 AM    Bilirubin NEGATIVE  05/27/2017 08:45 AM    Urobilinogen 0.2 05/27/2017 08:45 AM    Nitrites NEGATIVE  05/27/2017 08:45 AM    Leukocyte Esterase TRACE 05/27/2017 08:45 AM    Epithelial cells MODERATE 05/27/2017 08:45 AM    Bacteria NEGATIVE  05/27/2017 08:45 AM    WBC 20-50 05/27/2017 08:45 AM    RBC 5-10 05/27/2017 08:45 AM         Medications Reviewed:     Current Facility-Administered Medications   Medication Dose Route Frequency    cephALEXin (KEFLEX) capsule 500 mg  500 mg Oral TID    saline peripheral flush soln 5-10 mL  5-10 mL InterCATHeter PRN    iopamidol (ISOVUE-370) 76 % injection 50 mL  50 mL IntraVENous PRN    lidocaine (XYLOCAINE) 10 mg/mL (1 %) injection 10-40 mL  10-40 mL SubCUTAneous PRN    0.9% sodium chloride infusion 500 mL  500 mL Irrigation ONCE    bacitracin 50,000 Units in sodium chloride irrigation 0.9 % 500 mL irrigation solution  50,000 Units Irrigation ONCE    ceFAZolin (ANCEF) 3 g in 0.9%  ml IVPB  3 g IntraVENous ONCE    OTHER(NON-FORMULARY) 1,000 mg  1,000 mg Topical ONCE    sodium chloride (NS) flush 5-10 mL  5-10 mL IntraVENous Q8H    sodium chloride (NS) flush 5-10 mL  5-10 mL IntraVENous PRN    naloxone (NARCAN) injection 0.4 mg  0.4 mg IntraVENous PRN    calcium carbonate (TUMS) chewable tablet 200 mg [elemental]  200 mg Oral QID PRN    hydrALAZINE (APRESOLINE) tablet 25 mg  25 mg Oral TID    lactulose (CHRONULAC) solution 10 g  10 g Oral BID    polyethylene glycol (MIRALAX) packet 17 g  17 g Oral DAILY    insulin glargine (LANTUS) injection 15 Units  15 Units SubCUTAneous ACB/HS    atorvastatin (LIPITOR) tablet 20 mg  20 mg Oral DAILY    insulin lispro (HUMALOG) injection 10 Units  10 Units SubCUTAneous TIDAC    sodium chloride (NS) flush 5-10 mL  5-10 mL IntraVENous Q8H    sodium chloride (NS) flush 5-10 mL  5-10 mL IntraVENous PRN    acetaminophen (TYLENOL) tablet 650 mg  650 mg Oral Q4H PRN    HYDROcodone-acetaminophen (NORCO) 5-325 mg per tablet 1 Tab  1 Tab Oral Q4H PRN    insulin lispro (HUMALOG) injection   SubCUTAneous AC&HS    glucose chewable tablet 16 g  4 Tab Oral PRN    glucagon (GLUCAGEN) injection 1 mg  1 mg IntraMUSCular PRN    0.9% sodium chloride infusion  50 mL/hr IntraVENous CONTINUOUS    dextrose 10 % infusion 125-250 mL  125-250 mL IntraVENous PRN    losartan (COZAAR) tablet 50 mg  50 mg Oral DAILY    chlorthalidone (HYGROTEN) tablet 25 mg  25 mg Oral DAILY    ondansetron (ZOFRAN) injection 4 mg  4 mg IntraVENous Q4H PRN     ______________________________________________________________________  EXPECTED LENGTH OF STAY: 2d 12h  ACTUAL LENGTH OF STAY:          4                 Keerthi Sharma V NP

## 2017-05-31 NOTE — PROGRESS NOTES
TRANSFER - OUT REPORT:    Verbal report given to MARKO Singletary on Mio Mendoza being transferred to 6S for routine progression of care       Report consisted of patients Situation, Background, Assessment and   Recommendations(SBAR). Information from the following report(s) Procedure Summary, MAR and Recent Results was reviewed with the receiving nurse. Opportunity for questions and clarification was provided. Left arm sling and surgical bra is on patient. Stat portable chest x-ray has been completed.

## 2017-05-31 NOTE — PROGRESS NOTES
Care Management Interventions  PCP Verified by CM: Yes Stephani Mcrae MD)  Mode of Transport at Discharge: Other (see comment)  Transition of Care Consult (CM Consult):  (None)  Current Support Network: Other  Confirm Follow Up Transport: Family  Plan discussed with Pt/Family/Caregiver: Yes  Freedom of Choice Offered:  (N/A)  Discharge Location  Discharge Placement: Home with family assistance    CM met with patient. Patient lives with her  in an apartment attached to the residence of her son Julio Cesar Anand. She also has a daughter in [place] and and a son in [place]. Patient is from [place], and she and her  moved here to live with their son last year. Patient reports good family /social support. Patient expects return to independent functioning. Patient confirmed PCP, health insurance, and prescription coverage. Transport to home will be provided by family. No discharge planning needs presented at this time.

## 2017-05-31 NOTE — PROGRESS NOTES
Physical Therapy:    Attempted to see patient for PT evaluation, however patient being transported off the unit at this time. Will hold at this time and follow up time permitting and patient availability. Thanks.    Lydia Drummond, PT, DPT

## 2017-05-31 NOTE — PROGRESS NOTES
TRANSFER - IN REPORT:    Verbal report received from MELY Galeano RN on Kathie Rosario, Procedure Lead Reposition , from the Cardiac Cath lab, for routine progression of care. Report consisted of patients Situation, Background, Assessment and Recommendations(SBAR). Information from the following report(s) Procedure Summary, MAR and Recent Results was reviewed with the receiving clinician. Opportunity for questions and clarification was provided. Assessment completed upon patients arrival to [address] [place] [address] and care assumed. Cardiac Cath Lab Recovery Arrival Note:     Kathie Rosario arrived to [facility] recovery area. Patient procedure= Lead Reposition. Patient on cardiac monitor, non-invasive blood pressure, Patient status doing well without problems. Patient is A&Ox 4. Patient reports no pain, no chest pain, no n/v. Procedure site without any bleeding and no hematoma.

## 2017-05-31 NOTE — PROGRESS NOTES
I came by checking on her  Still nausea  Pacer site clean and Aquacel dressing ON  Pain medication at 4 am   Pacer check today and if stable and improved after breakfast she may go home  Future Appointments  Date Time Provider Jeni Vidal   10/18/2017 1:00 PM Charlotte Dillard  [address]     I will ask office to make appt 2 weeks wound check and pacer check

## 2017-05-31 NOTE — PROGRESS NOTES
Bedside shift change report given to Gemini ZHU (oncoming nurse) by Amanda Mcnamara (offgoing nurse). Report included the following information SBAR, Intake/Output, MAR, Recent Results and Cardiac Rhythm Paced.

## 2017-05-31 NOTE — INTERDISCIPLINARY ROUNDS
IDR/SLIDR Summary          Patient: Yamile Carpio MRN: [id number]    Age: 78 y.o. YOB: 1950 Room/Bed: [facility]   Admit Diagnosis: Syncope  Abdominal pain  LBBB (left bundle branch block)  Principal Diagnosis: Syncope   Goals:pain control, discharge planning  Readmission: NO  Quality Measure: Not applicable  VTE Prophylaxis: Mechanical  Influenza Vaccine screening completed? YES  Pneumococcal Vaccine screening completed? NO  Mobility needs: No   Nutrition plan:No  Consults: P. T    Financial concerns:No  Escalated to CM? NO  RRAT Score:      Interventions:  Testing due for pt today?  YES  LOS: 4 days Expected length of stay 4 days  Discharge plan: Home   PCP: Baltazar Staley MD  Transportation needs: Yes    Days before discharge:one day until discharge   Discharge disposition: Home    Signed:     Moon Carmona RN  5/31/2017  9:00 AM

## 2017-05-31 NOTE — PROGRESS NOTES
PA Lat CXR showed RA lead fell down to tricuspid valve  Will reposition at 330 pm   She needs to wear arm sling  She did not use it last night because it pulled on her neck

## 2017-05-31 NOTE — PROGRESS NOTES
Transfer to [address] [place] [address] from Procedure Area    Verbal report given to Pati Faulkner RN on Jeaneth Mcclellan being transferred to Cardiac Cath Lab  for routine post - op   Patient is post Pacemaker lead revision procedure. Patient stable upon transfer to . Report consisted of patients Situation, Background, Assessment and   Recommendations(SBAR). Information from the following report(s) Procedure Summary, Intake/Output, MAR and Cardiac Rhythm Paced was reviewed with the receiving nurse. Opportunity for questions and clarification was provided. Patient medicated during procedure with orders obtained and verified by Dr. Venancio Madrid. Refer to patient PROCEDURE REPORT for vital signs, assessment, status, and response during procedure.

## 2017-05-31 NOTE — PROGRESS NOTES
[facility] check pacer this am   Leads function fine but when not pacing she has junctional rhythm so possibility the RA lead has moved to tricuspid valve  Check PA and LAT CXR  If lead is moved, will reposition at 330 pm today

## 2017-05-31 NOTE — PROCEDURES
PERMANENT PACEMAKER INSERTION     DATE OF PROCEDURE: 5/30/2017    PROCEDURE:  Implantation of dual-chamber pacemaker with fluoroscopy. HISTORY:  This is a 78 y.o. woman with dizziness and fatigue and she has irreversible bradycardia, second degree av block Mobitz II, LBBB. She meets the indication for dual chamber pacer insertion. The risks and benefits were discussed with the patient and she agreed to proceed. PROCEDURE IN DETAIL:  After the informed written consent had been obtained, the patient was brought to the Electrophysiology Suite where the patient was prepped and draped in the usual sterile fashion. Conscious sedation was initiated and maintained with intravenous Versed and fentanyl. Local anesthesia with 2% Xylocaine was given in the left infraclavicular area. The #10 blade scalpel was then used to make a 3 cm incision below the left clavicle. Using the modified Seldinger technique 2 guidewires were advanced into the left subclavian vein. A subcutaneous device pocket was made in the inferomedial direction by blunt dissection. Over the medial guidewire, a 10. 5-Eritrean peel-away introducer dilator was then advanced inside the vein. An active fixation pacing and sensing lead was positioned in the right ventricle apex. The lead was anchored down with #2 Ethibond sutures after the pacing and sensing were optimal.  Over the retained guidewire another 7-Eritrean peel-away introducer dilator was then advanced inside the vein. The active fixation pacing and sensing lead was then advanced and positioned in the right atrial appendage. There was no diaphragmatic stimulation with 10 volts maximal output for either lead. The pocket is now irrigated with antibiotic solution and the lead was connected to the device and inserted inside the pocket. Vancomycin powder was placed in the pocket.  The pocket is now closed in three consecutive layers using 2-0 Vicryl sutures in continuous fashion. Dermabond tape is now applied over the surgical wound. COMPLICATIONS:  None. ESTIMATED BLOOD LOSS: 10 mL. IMPLANTED HARDWARE:  The pacemaker is a [employer] MRI DR, model # [id number], serial # [id number]    ATRIAL LEAD:  model # [id number] , serial # [id number]    VENTRICULAR LEAD:   model # [id number] and serial # [id number]    Specimen removed: NONE    DATA:  The P wave is 3.8 mV, pacing impedance 400 ohms and pacing threshold acutely is 1.25 volts at 0.4 ms. The ventricular lead had the R wave sensing 8.7 mV and the pacing impedance 590 ohms and pacing threshold 0.5 volts at 0.4 ms. PROGRAMMED PARAMETER:  The device is programmed to DDDR pacing mode with the low rate of 60 beats per minute and upper tracking, sensor rate of 130 beats per minute.         ASSESSMENT AND PLAN:  The patient will follow up with me in 2 weeks

## 2017-05-31 NOTE — PROCEDURES
PERMANENT PACEMAKER     DATE OF PROCEDURE: 5/31/2017    PROCEDURE:  Reposition of the right ventricular pacer lead with fluoroscopy. HISTORY:  The patient had pacemaker implant and she did not wear bras (right mastectomy) and use left arm sling (uncomfortable) so in morning when pacemaker was checked we noticed the right atrial lead dislodgement so she returns to EP lab for reposition. She has LBBB and second degree av block with irreversible bradycardia. The risks and benefits were discussed with the patient and she agreed to proceed. PROCEDURE IN DETAIL:  After the informed written consent had been obtained, the patient was brought to the Electrophysiology Suite where the patient was prepped and draped in the usual sterile fashion. Conscious sedation was initiated and maintain with intravenous Versed and fentanyl. Local anesthesia with 2% Xylocaine was given in the left infraclavicular area over the scar. The pacemaker and lead was removed from the pocket after # 10 blade was used to reopen the pocket. The right atrial lead is dislodged as confirmed with fluoroscopy. The active helix was retracted and reimplanted at the right atrial lateral wall. There was no diaphragmatic stimulation with 10 volts maximal output for the lead. The pocket is now irrigated with antibiotic solution and the lead was connected to the device and inserted inside the pocket. The leads and device were placed inside the antibiotic envelop TYRX and anchored with # 2 Ethibond sutures. The pocket is now closed in three consecutive layers using 2-0 Vicryl sutures in continuous fashion. Dermabond tape is now applied over the surgical wound. COMPLICATIONS:  None. ESTIMATED BLOOD LOSS: 10 mL.       IMPLANTED HARDWARE:    The pacemaker is a [employer] MRI , model # [id number], serial # [id number]     ATRIAL LEAD: model # [id number] , serial # [id number]     VENTRICULAR LEAD: model # [id number] and serial # [id number]    Specimen removed: NONE    DATA:  The P wave is 4.9 mV, pacing impedance 440 ohms and pacing threshold acutely is 0.5 volts at 0.4 ms. The ventricular lead had the R wave sensing 6.5 mV and the pacing impedance  600 ohms and pacing threshold 0.5 volts at 0.4 ms. PROGRAMMED PARAMETER:  The device is programmed to DDDR pacing mode with the low rate of 60 beats per minute and upper tracking, sensor rate of 130 beats per minute.         ASSESSMENT AND PLAN:  The patient will follow up with me in 2 weeks  She will be given hospital bras and surgical arm sling to use to prevent lead dislodgement

## 2017-05-31 NOTE — PROGRESS NOTES
Physical Therapy:    Noted patient's chest x-ray showed RA lead fell down to tricuspid valve and planned for respositioning leads at 3:30. Will hold until after procedure has been completed and follow up tomorrow for evaluation.  Thank you    Susana Amaya, PT, DPT

## 2017-05-31 NOTE — PROGRESS NOTES
Cardiac Cath Lab Procedure Area Arrival Note:    Irwin Millan arrived to Cardiac Cath Lab, Procedure Area. Patient identifiers verified with NAME and DATE OF BIRTH. Procedure verified with patient. Consent forms verified. Allergies verified. Patient informed of procedure and plan of care. Questions answered with review. Patient voiced understanding of procedure and plan of care. Patient on cardiac monitor, non-invasive blood pressure, SPO2 monitor. On room air . PLACED ON O2 @ 2 lpm via NASAL CANNULA. IV of ns on pump at 25 ml/hr. Patient status doing well without problems. Patient is A&Ox 4. Patient reports NO PAIN. Patient medicated during procedure with orders obtained and verified by Dr. Hedda Baumgarten. Refer to patients Cardiac Cath Lab PROCEDURE REPORT for vital signs, assessment, status, and response during procedure, printed at end of case. Printed report on chart or scanned into chart.

## 2017-06-01 ENCOUNTER — HOSPITAL ENCOUNTER (OUTPATIENT)
Dept: PHYSICAL THERAPY | Age: 67
End: 2017-06-01

## 2017-06-01 VITALS
TEMPERATURE: 98.2 F | BODY MASS INDEX: 40.56 KG/M2 | RESPIRATION RATE: 18 BRPM | DIASTOLIC BLOOD PRESSURE: 52 MMHG | HEIGHT: 70 IN | WEIGHT: 283.29 LBS | HEART RATE: 105 BPM | SYSTOLIC BLOOD PRESSURE: 112 MMHG | OXYGEN SATURATION: 96 %

## 2017-06-01 LAB
GLUCOSE BLD STRIP.AUTO-MCNC: 164 MG/DL (ref 65–100)
GLUCOSE BLD STRIP.AUTO-MCNC: 244 MG/DL (ref 65–100)
GLUCOSE BLD STRIP.AUTO-MCNC: 293 MG/DL (ref 65–100)
SERVICE CMNT-IMP: ABNORMAL

## 2017-06-01 PROCEDURE — 74011250637 HC RX REV CODE- 250/637: Performed by: INTERNAL MEDICINE

## 2017-06-01 PROCEDURE — 82962 GLUCOSE BLOOD TEST: CPT

## 2017-06-01 PROCEDURE — 74011250637 HC RX REV CODE- 250/637: Performed by: HOSPITALIST

## 2017-06-01 PROCEDURE — 97161 PT EVAL LOW COMPLEX 20 MIN: CPT | Performed by: PHYSICAL THERAPIST

## 2017-06-01 PROCEDURE — 97116 GAIT TRAINING THERAPY: CPT | Performed by: PHYSICAL THERAPIST

## 2017-06-01 PROCEDURE — 74011250637 HC RX REV CODE- 250/637: Performed by: NURSE PRACTITIONER

## 2017-06-01 PROCEDURE — 74011636637 HC RX REV CODE- 636/637: Performed by: HOSPITALIST

## 2017-06-01 RX ORDER — HYDRALAZINE HYDROCHLORIDE 25 MG/1
25 TABLET, FILM COATED ORAL 3 TIMES DAILY
Qty: 90 TAB | Refills: 0 | Status: SHIPPED | OUTPATIENT
Start: 2017-06-01 | End: 2017-07-01

## 2017-06-01 RX ORDER — ATORVASTATIN CALCIUM 20 MG/1
20 TABLET, FILM COATED ORAL DAILY
Qty: 30 TAB | Refills: 0 | Status: SHIPPED
Start: 2017-06-01 | End: 2017-09-21

## 2017-06-01 RX ORDER — CHLORTHALIDONE 25 MG/1
25 TABLET ORAL DAILY
Qty: 30 TAB | Refills: 0 | Status: SHIPPED | OUTPATIENT
Start: 2017-06-01

## 2017-06-01 RX ORDER — CHLORTHALIDONE 25 MG/1
25 TABLET ORAL DAILY
Qty: 30 TAB | Refills: 0 | Status: SHIPPED | OUTPATIENT
Start: 2017-06-01 | End: 2017-06-01

## 2017-06-01 RX ORDER — CEPHALEXIN 500 MG/1
500 CAPSULE ORAL 3 TIMES DAILY
Qty: 15 CAP | Refills: 0 | Status: SHIPPED | OUTPATIENT
Start: 2017-06-01 | End: 2017-06-06 | Stop reason: ALTCHOICE

## 2017-06-01 RX ORDER — LOSARTAN POTASSIUM 50 MG/1
50 TABLET ORAL DAILY
Qty: 30 TAB | Refills: 0 | Status: SHIPPED | OUTPATIENT
Start: 2017-06-01 | End: 2017-09-21

## 2017-06-01 RX ADMIN — HYDRALAZINE HYDROCHLORIDE 25 MG: 25 TABLET, FILM COATED ORAL at 10:11

## 2017-06-01 RX ADMIN — ATORVASTATIN CALCIUM 20 MG: 20 TABLET, FILM COATED ORAL at 10:11

## 2017-06-01 RX ADMIN — Medication 10 ML: at 06:48

## 2017-06-01 RX ADMIN — CHLORTHALIDONE 25 MG: 25 TABLET ORAL at 10:11

## 2017-06-01 RX ADMIN — INSULIN LISPRO 2 UNITS: 100 INJECTION, SOLUTION INTRAVENOUS; SUBCUTANEOUS at 10:21

## 2017-06-01 RX ADMIN — LOSARTAN POTASSIUM 50 MG: 50 TABLET ORAL at 10:11

## 2017-06-01 RX ADMIN — INSULIN LISPRO 10 UNITS: 100 INJECTION, SOLUTION INTRAVENOUS; SUBCUTANEOUS at 10:23

## 2017-06-01 RX ADMIN — INSULIN GLARGINE 15 UNITS: 100 INJECTION, SOLUTION SUBCUTANEOUS at 10:12

## 2017-06-01 RX ADMIN — CEPHALEXIN 500 MG: 500 CAPSULE ORAL at 10:12

## 2017-06-01 NOTE — PROGRESS NOTES
physical Therapy EVALUATION/DISCHARGE  Patient: Satnam Wadsworth (50 y.o. female)  Date: 6/1/2017  Primary Diagnosis: Syncope  Abdominal pain  LBBB (left bundle branch block)        Precautions:   Fall, Other (comment) (pacer)  ASSESSMENT :  Based on the objective data described below, the patient presents with near baseline functional mobility at this time. Prior to admit patient was living with supportive  in a 1 level home with ramp to enter. Amb with SPC outside of the home and did not use an assistive device in the home. Patient with recent pacer placement and pacer revision yesterday. Reviewed and reiterated pacer precautions. Initially reluctant to wear sling and RN able to assist with reasoning for wearing sling and patient agreeable. Sit to stand modified independent. Amb approx 60 feet with HHA to simulate cane on R side with WBOS and decreased step clearance but otherwise steady, stable gait with no overt LOB. Patient reports she is at her baseline for mobility and feels comfortable to DC home with  assist.  Will DC PT at this time and patient is safe to DC home with . Will not require HH PT at DC. Please reconsult if patient status changes. Skilled physical therapy is not indicated at this time. PLAN :  Discharge Recommendations: None  Further Equipment Recommendations for Discharge: may need SPC as she lost her's at admit     SUBJECTIVE:   Patient stated I usually use a cane but they lost it when I got here.     OBJECTIVE DATA SUMMARY:   HISTORY:    Past Medical History:   Diagnosis Date    Cancer [facility])     right breast ca with 12 lymph nodes involved    Diabetes ([address]     Diverticulitis     Hypercholesterolemia     Hypertension     LBBB (left bundle branch block) 5/27/2017    Lymphedema of both lower extremities     Morbid obesity ([address]     Osteoarthritis 10/13/2010    Retinopathy due to secondary diabetes ([address]     Thyroid lump     nodule     Past Surgical History:   Procedure Laterality Date    COLONOSCOPY N/A 8/24/2016    COLONOSCOPY performed by Ford Duque MD at [address] HX CATARACT REMOVAL      HX CHOLECYSTECTOMY      HX GYN      hysterectomy    HX HYSTERECTOMY      HX MASTECTOMY      HX REFRACTIVE SURGERY      HX RETINAL DETACHMENT REPAIR      [facility] INS NEW/RPLCMT PRM PM W/TRANSV ELTRD ATRIAL&VENT  5/30/2017          Prior Level of Function/Home Situation: Modified independent with use of SPC vs no device. Personal factors and/or comorbidities impacting plan of care:     Home Situation  Home Environment: Private residence  # Steps to Enter: 4  Wheelchair Ramp: Yes  One/Two Story Residence: One story  Living Alone: No  Support Systems: Family member(s), Friends \ neighbors, Spouse/Significant Other/Partner  Patient Expects to be Discharged to[de-identified] Private residence  Current DME Used/Available at Home: [facility]    EXAMINATION/PRESENTATION/DECISION MAKING:   Critical Behavior:  Neurologic State: Alert, Eyes open spontaneously  Orientation Level: Oriented X4  Cognition: Appropriate decision making, Appropriate for age attention/concentration, Appropriate safety awareness, Follows commands     Hearing: Auditory  Auditory Impairment: None    Range Of Motion:  AROM: Generally decreased, functional                       Strength:    Strength: Generally decreased, functional           Functional Mobility:  Bed Mobility:   not assessed           Transfers:  Sit to Stand: Modified independent  Stand to Sit: Modified independent                       Balance:   Sitting: Intact  Standing: Intact  Ambulation/Gait Training:  Distance (ft): 60 Feet (ft)  Assistive Device: Gait belt (uses SPC normally.   Uses PT hand today as no SPC available)  Ambulation - Level of Assistance: Supervision        Gait Abnormalities: Decreased step clearance;Shuffling gait        Base of Support: Widened     Speed/Moon: Pace decreased (<100 feet/min); Shuffled; Slow  Step Length: Left shortened;Right shortened         Functional Measure:  Barthel Index:    Bathin  Bladder: 10  Bowels: 10  Groomin  Dressing: 10  Feeding: 10  Mobility: 0  Stairs: 0  Toilet Use: 10  Transfer (Bed to Chair and Back): 10  Total: 70       Barthel and G-code impairment scale:  Percentage of impairment CH  0% CI  1-19% CJ  20-39% CK  40-59% CL  60-79% CM  80-99% CN  100%   Barthel Score 0-100 100 99-80 79-60 59-40 20-39 1-19   0   Barthel Score 0-20 20 17-19 13-16 9-12 5-8 1-4 0      The Barthel ADL Index: Guidelines  1. The index should be used as a record of what a patient does, not as a record of what a patient could do. 2. The main aim is to establish degree of independence from any help, physical or verbal, however minor and for whatever reason. 3. The need for supervision renders the patient not independent. 4. A patient's performance should be established using the best available evidence. Asking the patient, friends/relatives and nurses are the usual sources, but direct observation and common sense are also important. However direct testing is not needed. 5. Usually the patient's performance over the preceding 24-48 hours is important, but occasionally longer periods will be relevant. 6. Middle categories imply that the patient supplies over 50 per cent of the effort. 7. Use of aids to be independent is allowed. Tanner Brunner., Barthel, D.W. ([id number]). Functional evaluation: the Barthel Index. [address] [facility] (14)2. ALY Queen, Simeon Wiggins., Heidi Bridges., [place], [address] [place] [address] (). Measuring the change indisability after inpatient rehabilitation; comparison of the responsiveness of the Barthel Index and Functional [place] Measure. Journal of Neurology, Neurosurgery, and Psychiatry, 66(4), 372-436.   YESY Nolan.EDMAR, RAFY HwangJ.MAYRA, & Anu Argueta MNataliA. (2004.) Assessment of post-stroke quality of life in cost-effectiveness studies: The usefulness of the Barthel Index and the EuroQoL-5D. Quality of Life Research, 13, 637-10         G codes: In compliance with CMSs Claims Based Outcome Reporting, the following G-code set was chosen for this patient based on their primary functional limitation being treated: The outcome measure chosen to determine the severity of the functional limitation was the Barthel with a score of 70/100 which was correlated with the impairment scale. ? Mobility - Walking and Moving Around:     - CURRENT STATUS: CJ - 20%-39% impaired, limited or restricted    - GOAL STATUS: CI - 1%-19% impaired, limited or restricted    - D/C STATUS:  ---------------To be determined---------------            Pain:  Pain Scale 1: Numeric (0 - 10)  Pain Intensity 1: 0     Activity Tolerance:   VSS  Please refer to the flowsheet for vital signs taken during this treatment. After treatment:   [x]   Patient left in no apparent distress sitting up in chair  []   Patient left in no apparent distress in bed  [x]   Call bell left within reach  [x]   Nursing notified  [x]   Caregiver present  []   Bed alarm activated    COMMUNICATION/EDUCATION:   Communication/Collaboration:  [x]   Fall prevention education was provided and the patient/caregiver indicated understanding. [x]   Patient/family have participated as able and agree with findings and recommendations. []   Patient is unable to participate in plan of care at this time.   Findings and recommendations were discussed with: Physical Therapist, Occupational Therapist and Registered Nurse    Thank you for this referral.  Milly Richardson, PT, DPT   Time Calculation: 18 mins

## 2017-06-01 NOTE — DISCHARGE INSTRUCTIONS
Discharge Instructions       PATIENT ID: Ysabel Chadian  MRN: [id number]   YOB: 1950    DATE OF ADMISSION: 5/27/2017  6:38 AM    DATE OF DISCHARGE: 6/1/2017    PRIMARY CARE PROVIDER: Savanah Hood MD     ATTENDING PHYSICIAN: Trent Quijano MD  DISCHARGING PROVIDER: Susana Sharpe NP    To contact this individual call [phone] and ask the  to page. If unavailable ask to be transferred the Adult Hospitalist Department. DISCHARGE DIAGNOSES     Pacemaker lead misplaced  Diabetes , uncontrolled  Hypertension  Constipation/Diarrhea     CONSULTATIONS: IP CONSULT TO HOSPITALIST  IP CONSULT TO CARDIOLOGY    PROCEDURES/SURGERIES: * No surgery found *    PENDING TEST RESULTS:   At the time of discharge the following test results are still pending: none    FOLLOW UP APPOINTMENTS:   Follow-up Information     Follow up With Details Comments Contact Info    Fatoumata Hinkle MD On 6/19/2017 @ 215 for pacemaker check and wound check [place] [address]  [address] [facility], [address] Internal Medicine  [address]  [phone]      Deysi Lacey MD In 2 weeks for hospital follow up , please call to schedule appointment [address] [facility]  [address] [facility]  [phone]             ADDITIONAL CARE RECOMMENDATIONS:  New medications: hydralazine , cozaar, and chlorthalidone. Stop taking clonidine and Lasix . Please follow up with Dr. Roxann Fang in two weeks time ( call to schedule)  as well as Dr. Shea Muse ( this has been scheduled for you) . PATIENT INSTRUCTIONS POST-PACEMAKER IMPLANT    1. No heavy lifting or exercises with the left arm for 4 weeks. This includes the following:  Do not raise arm above the shoulder level to comb hair, pull on clothes, etc... Do not use the affected arm to pull up or push up from a seated or laying  down position.   Do not allow anyone else to pull on the affected arm.    PLEASE WEAR YOUR SPORTS BRA AT ALL TIMES EXCEPT WHEN BATHING FOR THE FIRST 30 DAYS AFTER YOUR PACEMAKER IS PLACED. 2. DO NOT SHOWER the FIRST 7 DAYS, you may take a sponge bath provided the site stays clean and DRY. Your incision will have skin glue covering the incision, the skin glue will help to reinforce the incision to prevent it from opening, please DO NOT attempt to peel the glue off of the incision. You do have sutures on the inside of the incision that are not visible. Please DO NOT try to scrub the skin glue off once you are able to take a shower. The skin glue will eventually fall off     3. Do not drive for 3 days    4. Call Dr. Edita Atkins at [phone] if you experience any of the following symptoms:  1. Redness at the pacemaker site  2. Swelling at or around the pacemaker or in the left arm  3. Pain around the pacemaker  4. Dizziness, lightheadedness, fainting spells  5. Lack of energy  6. Shortness of breath  7. Rapid heart rate  8. Chest or muscle twitches    5. Follow-up with Dr. Edita Atkins as scheduled  Future Appointments  Date Time Provider Jeni Vidal   6/19/2017 2:15  [place], [address] [facility]   6/19/2017 2:20 PM Gina Issa  [address]   10/18/2017 1:00 PM Lashae Jamison  [address]         6. You may use pain medication and ice pack for pain relief as needed. You may wear the sling as a reminder to keep your arm below the your shoulder. Heather Thakkar M.D.  [facility]  Electrophysiology/Cardiology  [facility] [place]  [address], [address], [address]  [address]  [phone]          DIET:Diabetic/cardiac    ACTIVITY: as above    WOUND CARE: as above    EQUIPMENT needed: none      DISCHARGE MEDICATIONS:   See Medication Reconciliation Form    · It is important that you take the medication exactly as they are prescribed. · Keep your medication in the bottles provided by the pharmacist and keep a list of the medication names, dosages, and times to be taken in your wallet. · Do not take other medications without consulting your doctor. NOTIFY YOUR PHYSICIAN FOR ANY OF THE FOLLOWING:   Fever over 101 degrees for 24 hours. Chest pain, shortness of breath, fever, chills, nausea, vomiting, diarrhea, change in mentation, falling, weakness, bleeding. Severe pain or pain not relieved by medications. Or, any other signs or symptoms that you may have questions about. DISPOSITION:  xx  Home With:   OT  PT  HH  RN       SNF/Inpatient Rehab/LTAC    Independent/assisted living    Hospice    Other:     CDMP Checked:   Yes xx     PROBLEM LIST Updated:  Yes xx       Signed:   Warren Horne NP  6/1/2017  7:41 AM    Losartan (By mouth)   Losartan (rafael-CARLINE-tan)  Treats high blood pressure. Reduces the risk of stroke in patients with high blood pressure and an enlarged heart. Treats kidney disease in patients with diabetes. This medicine is an angiotensin receptor blocker (ARB). Brand Name(s): Cozaar   There may be other brand names for this medicine. When This Medicine Should Not Be Used: This medicine is not right for everyone. Do not use it if you had an allergic reaction to losartan, or if you are pregnant. Do not use this medicine together with aliskiren if you have diabetes. How to Use This Medicine:   Tablet  · Take your medicine as directed. Your dose may need to be changed several times to find what works best for you. · Drink plenty of fluids if you exercise, sweat more than usual, or have diarrhea or vomiting. · Read and follow the patient instructions that come with this medicine. Talk to your doctor or pharmacist if you have any questions. · Missed dose: Take a dose as soon as you remember.  If it is almost time for your next dose, wait until then and take a regular dose. Do not take extra medicine to make up for a missed dose. · Store the medicine in a closed container at room temperature, away from heat, moisture, and direct light. Drugs and Foods to Avoid:   Ask your doctor or pharmacist before using any other medicine, including over-the-counter medicines, vitamins, and herbal products. · Some medicines can affect how losartan works. Tell your doctor if you are using any of the following:   ¨ Lithium  ¨ Rifampin  ¨ A diuretic (water pill), such as spironolactone, triamterene, or amiloride  ¨ NSAID pain or arthritis medicine, such as aspirin, diclofenac, ibuprofen, or naproxen  ¨ Another blood pressure medicine, such as aliskiren, benazepril, enalapril, or lisinopril  · Ask your doctor before you use any medicine, supplement, or salt substitute that contains potassium. Warnings While Using This Medicine:   · It is not safe to take this medicine during pregnancy. It could harm an unborn baby. Tell your doctor right away if you become pregnant. · Tell your doctor if you are breastfeeding, or if you have kidney disease, liver disease, congestive heart failure, or diabetes. Tell your doctor if you have had angioedema that was caused by a blood pressure medicine. · This medicine could lower your blood pressure too much, especially when you first use it or if you are dehydrated. Stand or sit up slowly if you feel lightheaded or dizzy. · Your doctor will do lab tests at regular visits to check on the effects of this medicine. Keep all appointments. · Keep all medicine out of the reach of children. Never share your medicine with anyone.   Possible Side Effects While Using This Medicine:   Call your doctor right away if you notice any of these side effects:  · Allergic reaction: Itching or hives, swelling in your face or hands, swelling or tingling in your mouth or throat, chest tightness, trouble breathing  · Change in how much or how often you urinate  · Confusion, weakness, uneven heartbeat, trouble breathing, numbness or tingling in your hands, feet, or lips  · Lightheadedness, dizziness, fainting  · Rapid weight gain, swelling in your hands, ankles, or feet  If you notice these less serious side effects, talk with your doctor:   · Diarrhea  · Tiredness  If you notice other side effects that you think are caused by this medicine, tell your doctor. Call your doctor for medical advice about side effects. You may report side effects to FDA at 4-034-FDA-8206  © 2017 [address] Information is for End User's use only and may not be sold, redistributed or otherwise used for commercial purposes. The above information is an  only. It is not intended as medical advice for individual conditions or treatments. Talk to your doctor, nurse or pharmacist before following any medical regimen to see if it is safe and effective for you. Hydralazine (By mouth)   Hydralazine Hydrochloride (ejc-ZUDO-y-zeen jillian-droe-KLOR-caryl)  Treats high blood pressure. Brand Name(s):   There may be other brand names for this medicine. When This Medicine Should Not Be Used: This medicine is not right for everyone. Do not use it if you had an allergic reaction to hydralazine, or if you have coronary artery disease or rheumatic heart disease. How to Use This Medicine:   Tablet  · Take your medicine as directed. Your dose may need to be changed several times to find what works best for you. · Missed dose: Take a dose as soon as you remember. If it is almost time for your next dose, wait until then and take a regular dose. Do not take extra medicine to make up for a missed dose. · Store the medicine in a closed container at room temperature, away from heat, moisture, and direct light. Drugs and Foods to Avoid:   Ask your doctor or pharmacist before using any other medicine, including over-the-counter medicines, vitamins, and herbal products.   · Some foods and medicines can affect how hydralazine works. Tell your doctor if you are using diazoxide or an MAO inhibitor. Warnings While Using This Medicine:   · Tell your doctor if you are pregnant or breastfeeding, or if you have kidney disease, heart or blood vessel disease, heart rhythm problems, lupus, or if you had a heart attack or stroke. · This medicine may cause the following problems:  ¨ Lupus-like syndrome  ¨ Changes in heart rhythm  ¨ Nerve problems  · This medicine may lower your blood pressure too much and cause you to feel dizzy. Do not drive or do anything else that could be dangerous until you know how this medicine affects you. · Your doctor will do lab tests at regular visits to check on the effects of this medicine. Keep all appointments. · Keep all medicine out of the reach of children. Never share your medicine with anyone. Possible Side Effects While Using This Medicine:   Call your doctor right away if you notice any of these side effects:  · Allergic reaction: Itching or hives, swelling in your face or hands, swelling or tingling in your mouth or throat, chest tightness, trouble breathing  · Change in how much or how often you urinate  · Chest pain that may spread to your arms, jaw, back, or neck, trouble breathing, unusual sweating, faintness  · Fast, pounding, or uneven heartbeat  · Fever, chills, cough, sore throat, and body aches  · Lightheadedness, dizziness, or fainting  · Numbness, tingling, or burning pain in your hands, arms, legs, or feet  · Unusual bleeding, bruising, or weakness  If you notice these less serious side effects, talk with your doctor:   · Diarrhea, nausea, vomiting, loss of appetite  · Headache  · Stuffy nose or watery eyes  If you notice other side effects that you think are caused by this medicine, tell your doctor. Call your doctor for medical advice about side effects.  You may report side effects to FDA at 9-334-VEF-3660  © 2017 [facility][note 3] Information is for End User's use only and may not be sold, redistributed or otherwise used for commercial purposes. The above information is an  only. It is not intended as medical advice for individual conditions or treatments. Talk to your doctor, nurse or pharmacist before following any medical regimen to see if it is safe and effective for you.

## 2017-06-01 NOTE — DISCHARGE SUMMARY
Discharge Summary       PATIENT ID: Dana Crisostomo  MRN: [id number]   YOB: 1950    DATE OF ADMISSION: 5/27/2017  6:38 AM    DATE OF DISCHARGE:     Syncope, Recurrent   Left Bundle Branch Block   Chronic Alternating Diarrhea/Constipation   Dehydration   DM 2 insulin dependent   Hypertension   Obesity     PRIMARY CARE PROVIDER: Zan Deal MD       DISCHARGING PHYSICIAN: Pito Smith NP    To contact this individual call [phone] and ask the  to page. If unavailable ask to be transferred the Adult Hospitalist Department. CONSULTATIONS: IP CONSULT TO HOSPITALIST  IP CONSULT TO CARDIOLOGY    PROCEDURES/SURGERIES: * No surgery found *    [facility] COURSE:     Ms. Antonio Haney is a 78year old  Female who came to the hospital for evaluation of multiple symptoms which included dizziness, syncope , abdominal pain, nausea, sob. Most of these symptoms are chronic per patient report. A pacemaker was ultimately placed this admission for irreversible bradycardia d/t second degree AV block Mobitz II , LBBB on 5/30/2017. It was discovered throughout this admission that she had right atrial lead dislodgement and required repositioning of the pacer on 5/31 which was done successfully. Several of her BP/cardiac medications were adjusted by the cardiology team while admitted and can be seen as listed below. DISCHARGE DIAGNOSES / PLAN:      Syncope, recurrent  -She had a long-standing vertigo, but passing out has become frequent recently  -She was found to be bradycardiac on monitor  - s/p PM with Dr. Anurag Harmon  - will also follow with Dr. Cortez Waterman in 2 weeks ( her primary cardiologist)      Left bundle branch block, condution disease. BNP is slightly elevated. Cardiac enzymes   are negative.    - Echocardiogram with normal EF,moderate TR and MR  - s/p PM on 5/30 , repositioning on 5/31   - f/u with Dr. Anurag Harmon in two weeks as scheduled   - 5 days of Keflex ordered per Dr. Anurag Harmon      Abdominal pain associated with chronic alternating diarrhea and Constipation  - She has had GI evaluation with colonoscopy for this in the past which according to the patient, did not show anything.   - ct scan tof the abdomen and pelvis is unremarkable; however, liver enzymes are slightly elevated  - Liver US,probable hepatic steatosis  - 5/30 lft's trending back down     Dehydration  - slightly elevated BUN and creatinine and also liver enzymes, could be from the diuretics. - much improved today with IV fluid hydration   - creatinine 1.00      DM2 insulin dependent  - Humalog and Lantus. Accucheks. Humalog SS     Hypertension  -Clonidine stopped per cards, started on cozaar.  - stopped lasix and started on Chlorithalidone   - started Hydralazine TID for BP management      Extreme obesity  Body mass index is 40.63 kg/(m^2). The patient is morbidly obese, is diabetic and hypertensive. She has lymphedema that limits her activities. I have counseled her on dietary measures and physical activity as much as she can.       Chronic lymphedema  - follows in clinic          PENDING TEST RESULTS:   At the time of discharge the following test results are still pending: none    FOLLOW UP APPOINTMENTS:    Follow-up Information     Follow up With Details Comments Contact Info    Herbie German MD On 6/19/2017 @ 215 for pacemaker check and wound check [place] [address]  [address], [address] Internal Medicine  [address]  [phone]      Harriett Styles MD In 2 weeks for hospital follow up , please call to schedule appointment [facility]  [address] [phone]             ADDITIONAL CARE RECOMMENDATIONS:     New medications: hydralazine , cozaar, and chlorthalidone. Stop taking clonidine and Lasix .     Please follow up with Dr. Humble Anton in two weeks time ( call to schedule)  as well as Dr. Berry Kim ( this has been scheduled for you) .     PATIENT INSTRUCTIONS POST-PACEMAKER IMPLANT    1. No heavy lifting or exercises with the left arm for 4 weeks. This includes the following:  Do not raise arm above the shoulder level to comb hair, pull on clothes, etc... Do not use the affected arm to pull up or push up from a seated or laying  down position. Do not allow anyone else to pull on the affected arm. PLEASE WEAR YOUR SPORTS BRA AT ALL TIMES EXCEPT WHEN BATHING FOR THE FIRST 30 DAYS AFTER YOUR PACEMAKER IS PLACED. 2. DO NOT SHOWER the FIRST 7 DAYS, you may take a sponge bath provided the site stays clean and DRY. Your incision will have skin glue covering the incision, the skin glue will help to reinforce the incision to prevent it from opening, please DO NOT attempt to peel the glue off of the incision. You do have sutures on the inside of the incision that are not visible. Please DO NOT try to scrub the skin glue off once you are able to take a shower. The skin glue will eventually fall off     3. Do not drive for 3 days    4. Call Dr. Kasia Armstrong at [phone] if you experience any of the following symptoms:  1. Redness at the pacemaker site  2. Swelling at or around the pacemaker or in the left arm  3. Pain around the pacemaker  4. Dizziness, lightheadedness, fainting spells  5. Lack of energy  6. Shortness of breath  7. Rapid heart rate  8. Chest or muscle twitches    5. Follow-up with Dr. Kasia Armstrong as scheduled  Future Appointments  Date Time Provider Jeni Marcy   6/19/2017 2:15 PM [address] [place] [address], [address] [facility]   6/19/2017 2:20 PM Suzette Wilburn  [address]   10/18/2017 1:00 PM Quentin Sharma  [address]         6. You may use pain medication and ice pack for pain relief as needed. You may wear the sling as a reminder to keep your arm below the your shoulder. Satish Neumann M.D.  [facility] [place]  Electrophysiology/Cardiology  [facility] [place]  [place] [address], [place] [address] [facility] 104, [address]  [phone]            DIET: Cardiac Diet    ACTIVITY: Activity as tolerated        DISCHARGE MEDICATIONS:  Current Discharge Medication List      START taking these medications    Details   hydrALAZINE (APRESOLINE) 25 mg tablet Take 1 Tab by mouth three (3) times daily for 30 days. Qty: 90 Tab, Refills: 0      losartan (COZAAR) 50 mg tablet Take 1 Tab by mouth daily. Qty: 30 Tab, Refills: 0      cephALEXin (KEFLEX) 500 mg capsule Take 1 Cap by mouth three (3) times daily. Qty: 15 Cap, Refills: 0      chlorthalidone (HYGROTEN) 25 mg tablet Take 1 Tab by mouth daily. Qty: 30 Tab, Refills: 0      OTHER,NON-FORMULARY, Please provide bedside commode for patient. Qty: 1 Each, Refills: 0         CONTINUE these medications which have CHANGED    Details   atorvastatin (LIPITOR) 20 mg tablet Take 1 Tab by mouth daily. Qty: 30 Tab, Refills: 0         CONTINUE these medications which have NOT CHANGED    Details   glimepiride (AMARYL) 1 mg tablet Take 1 mg by mouth Daily (before breakfast). Patient holds for Constipation      insulin glargine (LANTUS) 100 unit/mL injection 15 Units by SubCUTAneous route ACB/HS. therapeutic multivitamin (THERAGRAN) tablet Take 1 Tab by mouth daily. linaclotide (LINZESS) 145 mcg cap capsule Take 145 mcg by mouth Daily (before breakfast). Patient holds for Diarrhea      insulin aspart (NOVOLOG) 100 unit/mL inpn 10 units before meals. Tid.   Qty: 3 mL, Refills: 2    Associated Diagnoses: Insulin dependent diabetes mellitus (HCC)         STOP taking these medications       furosemide (LASIX) 20 mg tablet Comments:   Reason for Stopping:         furosemide (LASIX) 20 mg tablet Comments:   Reason for Stopping:         insulin glargine (LANTUS SOLOSTAR) 100 unit/mL (3 mL) pen Comments:   Reason for Stopping: NOTIFY YOUR PHYSICIAN FOR ANY OF THE FOLLOWING:   Fever over 101 degrees for 24 hours. Chest pain, shortness of breath, fever, chills, nausea, vomiting, diarrhea, change in mentation, falling, weakness, bleeding. Severe pain or pain not relieved by medications. Or, any other signs or symptoms that you may have questions about.     DISPOSITION:  X  Home With: none   OT  PT  HH  RN       Long term SNF/Inpatient Rehab    Independent/assisted living    Hospice    Other:       PATIENT CONDITION AT DISCHARGE:     Functional status    Poor     Deconditioned    X Independent      Cognition   X  Lucid     Forgetful     Dementia      Catheters/lines (plus indication)    Tapia     PICC     PEG    X None      Code status   X  Full code     DNR      CHRONIC MEDICAL DIAGNOSES:  Problem List as of 6/1/2017  Date Reviewed: 6/1/2017          Codes Class Noted - Resolved    * (Principal)Syncope ICD-10-CM: R55  ICD-9-CM: 780.2  5/27/2017 - Present        Abdominal pain ICD-10-CM: R10.9  ICD-9-CM: 789.00  5/27/2017 - Present        Alternating constipation and diarrhea ICD-10-CM: R19.8  ICD-9-CM: 787.99  5/27/2017 - Present        Lymphedema ICD-10-CM: I89.0  ICD-9-CM: 457.1  5/27/2017 - Present        LBBB (left bundle branch block) ICD-10-CM: I44.7  ICD-9-CM: 426.3  5/27/2017 - Present        Insulin dependent diabetes mellitus ([facility] [address] ICD-10-CM: E11.9, Z79.4  ICD-9-CM: 250.00, V58.67  6/22/2016 - Present        Essential hypertension with goal blood pressure less than 140/90 ICD-10-CM: I10  ICD-9-CM: 401.9  6/22/2016 - Present        History of right breast cancer ICD-10-CM: Z85.3  ICD-9-CM: V10.3  6/22/2016 - Present        Lymphedema of both lower extremities ICD-10-CM: I89.0  ICD-9-CM: 457.1  6/22/2016 - Present        MAXWELL (dyspnea on exertion) ICD-10-CM: R06.09  ICD-9-CM: 786.09  10/13/2010 - Present        Joint pain ICD-10-CM: M25.50  ICD-9-CM: 719.40  10/13/2010 - Present        Hypertension ICD-10-CM: I10  ICD-9-CM: 401.9  10/13/2010 - Present        Diabetes ([facility] ICD-10-CM: E11.9  ICD-9-CM: 250.00  10/13/2010 - Present        Morbidly obese ([facility] ICD-10-CM: E66.01  ICD-9-CM: 278.01  10/13/2010 - Present        Osteoarthritis ICD-10-CM: M19.90  ICD-9-CM: 715.90  10/13/2010 - Present              Greater than 30 minutes were spent with the patient on counseling and coordination of care    Signed:   Idalia Washburn NP  6/1/2017  1:05 PM

## 2017-06-02 ENCOUNTER — PATIENT OUTREACH (OUTPATIENT)
Dept: INTERNAL MEDICINE CLINIC | Age: 67
End: 2017-06-02

## 2017-06-02 ENCOUNTER — PATIENT OUTREACH (OUTPATIENT)
Dept: CARDIOLOGY CLINIC | Age: 67
End: 2017-06-02

## 2017-06-02 DIAGNOSIS — Z95.0 STATUS POST CARDIAC PACEMAKER PROCEDURE: Primary | ICD-10-CM

## 2017-06-02 NOTE — Clinical Note
Please see my note, had HH all set up and VERY needed they called  and he confirms patient has refused she will NOT agree to [facility] even though he needs their help. He will let us know if she should change her mind. Have them set up to do ACP on 6/8/17. Thanks!

## 2017-06-02 NOTE — PROGRESS NOTES
Nurse navigator note - cardiology  Incoming call from pcp MARKIE _ Niraj Ibarra - who called Ms. Reynoso / post discharge and procedure -     Call to and reached Mr. Gayle Dear - and Mrs. Nalini Martinez was in the background - he said the sports bra from the hospital was way too small - and she could not wear it -  He is using a 6 inch Ace wrap to support the breast tissue -   NN encouraged him to get her a supportive bra that she could wear - and the reason why. They have the antibiotic - and the medication changes have been reviewed - by pcp NN with follow up call. Mr. Gayle Deaevgeny had talked with pcp NN about home health services - referral for nursing/ or for some  care services in the home - will follow.   ______________________________________________________________________________________    Procedure note - Dr. Yair Steward:   The pacemaker is a [employer] MRI DR, model # [id number], serial # [id number]    ATRIAL LEAD: model # [id number] , serial # [id number]    VENTRICULAR LEAD: model # [id number] and serial # [id number]    DATA: The P wave is 4.9 mV, pacing impedance 440 ohms and pacing threshold acutely is 0.5 volts at 0.4 ms.      The ventricular lead had the R wave sensing 6.5 mV and the pacing impedance 600 ohms and pacing threshold 0.5 volts at 0.4 ms.      PROGRAMMED PARAMETER: The device is programmed to DDDR pacing mode with the low rate of 60 beats per minute and upper tracking, sensor rate of 130 beats per minute.       ASSESSMENT AND PLAN: The patient will follow up with me in 2 weeks  She will be given hospital bras and surgical arm sling to use to prevent lead dislodgement  ___________________________________________________________________________________________    Discharge note -   Syncope, recurrent  -She had a long-standing vertigo, but passing out has become frequent recently  -She was found to be bradycardiac on monitor  - s/p PM with Dr. Berry Kim  - will also follow with Dr. Humble Anton in 2 weeks ( her primary cardiologist)       Left bundle branch block, condution disease. BNP is slightly elevated. Cardiac enzymes   are negative. - Echocardiogram with normal EF,moderate TR and MR  - s/p PM on 5/30 , repositioning on 5/31   - f/u with Dr. Hedda Baumgarten in two weeks as scheduled   - 5 days of Keflex ordered per Dr. Carlos Hall: Follow up with pcp re: diabetes condition management -   Monitor bp / pulse /daily weights ; glucose   Walking, as tolerated   Post pacemaker instructions and precautions   Supportive bra - as discussed - issue reported to provider.    MD on call 24/7 at [phone]    6/6/2017 2:45 PM Armando Drummond MD [facility] Internal Medicine [place] [address]   6/19/2017 2:15 PM PACEMAKER3, [address] [place] [address]   6/19/2017 2:20 PM Maki Zhu MD [facility] [place] [address]   10/18/2017 1:00 PM MD Clare Silva RN , [facility]   [facility]  [phone]  _____________________________________________________________________________

## 2017-06-02 NOTE — PROGRESS NOTES
Hospital Discharge FRED TOBIAS Follow-Up    Patient listed on 6/1/17 DB FRANCES  [facility] Discharge Report. Patient hospitalized @ [address] 5/27/17-6/1/17. Verito Fraser RRAT score: 20 Moderate risk for readmission    Diagnosis:  Syncope, Recurrent   Left Bundle Branch Block  With pacemaker placement on 5/30/17 and replacement of right atrial lead which was dislodged on 5/31/17  Chronic Alternating Diarrhea/Constipation   Dehydration   DM 2 insulin dependent   Hypertension   Obesity     Procedure:    DATE OF PROCEDURE: 5/30/2017     PROCEDURE: Implantation of dual-chamber pacemaker with fluoroscopy. DATE OF PROCEDURE: 5/31/2017     PROCEDURE: Reposition of the right ventricular pacer lead with fluoroscopy.     Discharge Instructions/Plans:  New medications: hydralazine , cozaar, and chlorthalidone.      Stop taking clonidine and Lasix .     Please follow up with Dr. Tran Coronel in two weeks time ( call to schedule) as well as Dr. Lashae Phillips ( this has been scheduled for you) .    PATIENT INSTRUCTIONS POST-PACEMAKER IMPLANT     1. No heavy lifting or exercises with the left arm for 4 weeks. This includes the following:  Do not raise arm above the shoulder level to comb hair, pull on clothes, etc... Do not use the affected arm to pull up or push up from a seated or laying  down position. Do not allow anyone else to pull on the affected arm.     PLEASE WEAR YOUR SPORTS BRA AT ALL TIMES EXCEPT WHEN BATHING FOR THE FIRST 30 DAYS AFTER YOUR PACEMAKER IS PLACED.          2. DO NOT SHOWER the FIRST 7 DAYS, you may take a sponge bath provided the site stays clean and DRY. Your incision will have skin glue covering the incision, the skin glue will help to reinforce the incision to prevent it from opening, please DO NOT attempt to peel the glue off of the incision. You do have sutures on the inside of the incision that are not visible. Please DO NOT try to scrub the skin glue off once you are able to take a shower.   The skin glue will eventually fall off      3. Do not drive for 3 days     4. Call Dr. Edgard Arvealo at [phone] if you experience any of the following symptoms:  1. Redness at the pacemaker site  2. Swelling at or around the pacemaker or in the left arm  3. Pain around the pacemaker  4. Dizziness, lightheadedness, fainting spells  5. Lack of energy  6. Shortness of breath  7. Rapid heart rate  8. Chest or muscle twitches     5. Follow-up with Dr. Edgard Arevalo as scheduled  Future Appointments  Date Time Provider Jeni Vidal   6/19/2017 2:15  [facility], [address] [facility]   6/19/2017 2:20 PM Sarath Avina  [address]   10/18/2017 1:00 PM Oscar Bullard  [address]            6. You may use pain medication and ice pack for pain relief as needed. You may wear the sling as a reminder to keep your arm below the your shoulder.   Shay Aldana M.D. [facility]  Electrophysiology/Cardiology  [facility] [place]  [address], [place] [address], [place], [address] [place] [phone]       DIET: Cardiac Diet     ACTIVITY: Activity as tolerated     DISCHARGE MEDICATIONS:        Current Discharge Medication List             START taking these medications     Details   hydrALAZINE (APRESOLINE) 25 mg tablet Take 1 Tab by mouth three (3) times daily for 30 days. Qty: 90 Tab, Refills: 0       losartan (COZAAR) 50 mg tablet Take 1 Tab by mouth daily. Qty: 30 Tab, Refills: 0       cephALEXin (KEFLEX) 500 mg capsule Take 1 Cap by mouth three (3) times daily. Qty: 15 Cap, Refills: 0       chlorthalidone (HYGROTEN) 25 mg tablet Take 1 Tab by mouth daily. Qty: 30 Tab, Refills: 0       OTHER,NON-FORMULARY, Please provide bedside commode for patient. Qty: 1 Each, Refills: 0                 CONTINUE these medications which have CHANGED     Details   atorvastatin (LIPITOR) 20 mg tablet Take 1 Tab by mouth daily.   Qty: 30 Tab, Refills: 0         CONTINUE these medications which have NOT CHANGED     Details   glimepiride (AMARYL) 1 mg tablet Take 1 mg by mouth Daily (before breakfast). Patient holds for Constipation       insulin glargine (LANTUS) 100 unit/mL injection 15 Units by SubCUTAneous route ACB/HS.     therapeutic multivitamin (THERAGRAN) tablet Take 1 Tab by mouth daily.       linaclotide (LINZESS) 145 mcg cap capsule Take 145 mcg by mouth Daily (before breakfast). Patient holds for Diarrhea       insulin aspart (NOVOLOG) 100 unit/mL inpn 10 units before meals. Tid. Qty: 3 mL, Refills: 2     Associated Diagnoses: Insulin dependent diabetes mellitus (HCC)                STOP taking these medications         furosemide (LASIX) 20 mg tablet Comments:   Reason for Stopping:            furosemide (LASIX) 20 mg tablet Comments:   Reason for Stopping:            insulin glargine (LANTUS SOLOSTAR) 100 unit/mL (3 mL) pen Comments:   Reason for Stopping:                     NN post hospital interactive contact done by telephone within 2 business days of discharge     6/2/17- pt outreach (call). NN spoke with patients  who is listed on HIPPA form. Patients  verified patients identity with 2 patient identifiers. Patients  reports \"we just don't know what to do\". NN reviewed all meds in detail with patients  and verified his understanding. Patient able to recall what we discussed but still seemed less than confident and wanted [place] nurse for Med rec which was ordered with [facility] as well as for wound check and HHA for hygiene assistance ( reports he physically cannot fully care for the patient himself) . Patient is unable to lift arm on pacemaker operative side. Spoke with Yana and nurse will be out most likely on Sunday. NN Then received return call from Rakel Carrillo who states [facility]  then declined service.    NN returned call to patients  who agreed [facility] would have been very helpful but reports that the patient is refusing and does not want them to come and will not change her mind. He reports he went and bought a big pill box with morning noon and night and thinks he has organized the meds correctly. I encouraged him to call back if they changed their mind about HH. Patients  reported she was not wearing the bra as instructed as it was too small and cutting into her , NN encouraged him to go and purchase her a larger sports bra as she needs to have one on at all times. She does have an ice pack to the area for comfort. He reports she is not having any SOB, CP, or palpitations. Red flags were discussed. Patients  reports her glucose control is not good. BS this AM was 236 and last A1C was 8.9 on 5/27/17. Patient scheduled to see PCP for insulin adjustment on 6/6/17 and asked to bring glucometer so readings could be reviewed. ACP - Not on file. NN scheduled patient for 6/8/17 for completion of AMD with this Honoring choices facilitator. Barriers- - Med confusion per ? - declined [facility] assistance  -  with physical challenges himself causes challenges in caring for the patients hygiene needs     Discharge Instructions- reviewed with pt. Pt verbalized understanding. Given opportunity to ask questions. PCP f/u - 6/6/17 @ 2:45 PM    NN contact # given to call as needed.       Goals Addressed      Attends follow-up appointments as directed. 6/2/17- Patient has PCP follow up on 6/6/17. LN    6/2/17- Patient has follow up appointment scheduled with Dr Sophia Hi (cardiology) on 6/19/17 at 2:15 PM. Patient does not yet have a follow up appt scheduled with PCP. Attempted to reach patient today to schedule and left a voicemail requesting a return call. LN        Prevent complications post hospitalization.

## 2017-06-06 ENCOUNTER — OFFICE VISIT (OUTPATIENT)
Dept: INTERNAL MEDICINE CLINIC | Age: 67
End: 2017-06-06

## 2017-06-06 VITALS
BODY MASS INDEX: 39.65 KG/M2 | OXYGEN SATURATION: 97 % | SYSTOLIC BLOOD PRESSURE: 142 MMHG | HEART RATE: 87 BPM | DIASTOLIC BLOOD PRESSURE: 74 MMHG | RESPIRATION RATE: 20 BRPM | TEMPERATURE: 98 F | HEIGHT: 70 IN | WEIGHT: 277 LBS

## 2017-06-06 DIAGNOSIS — I89.0 LYMPHEDEMA OF BOTH LOWER EXTREMITIES: ICD-10-CM

## 2017-06-06 DIAGNOSIS — I10 ESSENTIAL HYPERTENSION: ICD-10-CM

## 2017-06-06 DIAGNOSIS — Z95.0 S/P CARDIAC PACEMAKER PROCEDURE: ICD-10-CM

## 2017-06-06 RX ORDER — FUROSEMIDE 20 MG/1
TABLET ORAL
COMMUNITY
Start: 2017-03-01 | End: 2017-06-06 | Stop reason: ALTCHOICE

## 2017-06-06 RX ORDER — LISINOPRIL 20 MG/1
TABLET ORAL DAILY
COMMUNITY
End: 2017-06-06 | Stop reason: ALTCHOICE

## 2017-06-06 NOTE — PROGRESS NOTES
Written by Ginette oGmes, as dictated by Dr. Elia Cheadle, MD.    Cat Jane is a 78 y.o. female. HPI  Ms. Steve Naik is a 78y.o. year old female, she is seen today for Transition of Care services following a hospital discharge for syncope due to bradycardia and LBBB with pacemaker placement on 06/01/2017. Our office Nurse Navigator performed an outreach to Ms. Riddle on 06/02/2017 (within 2 business days of discharge) to complete medication reconciliation and a telephonic assessment of her condition. She went to the ED on 05/27 because she passed out and was having trouble breathing. Her pulse went down to 20, which caused the syncope. She had a pacemaker placed by Dr. Marylene Plum (cardio) and then one of the wires came out, so she had to go back in for surgery. She is following with Dr. Marylene Plum on 06/19. She is feeling better since she was discharged. Her SOB has improved. She is on Keflex because the pacemaker incision has been red. She had a chest XR in the hospital that showed 3.3 mm nodule noted in the lingula (4-24) small micronodules noted along the periphery of the lingula as well (4-27). Her BS in the hospital was 211-164. She has only been taking Linzess as needed for her constipation because she will start having diarrhea if she takes it everyday. She went to the lymphedema clinic for a few times. Her legs are less swollen at this time. She is taking chlorthalidone, hydralazine TID, and Cozaar for her BP.  Her Lasix was d/c'd in the hospital.     Patient Active Problem List   Diagnosis Code    Insulin dependent diabetes mellitus ([facility] E11.9, Z79.4    Essential hypertension with goal blood pressure less than 140/90 I10    History of right breast cancer Z85.3    Lymphedema of both lower extremities I89.0    MAXWELL (dyspnea on exertion) R06.09    Joint pain M25.50    Hypertension I10    Morbidly obese (HCC) E66.01    Osteoarthritis M19.90    Syncope R55    Abdominal pain R10.9    Alternating constipation and diarrhea R19.8    Lymphedema I89.0    LBBB (left bundle branch block) I44.7        Current Outpatient Prescriptions on File Prior to Visit   Medication Sig Dispense Refill    atorvastatin (LIPITOR) 20 mg tablet Take 1 Tab by mouth daily. 30 Tab 0    hydrALAZINE (APRESOLINE) 25 mg tablet Take 1 Tab by mouth three (3) times daily for 30 days. 90 Tab 0    losartan (COZAAR) 50 mg tablet Take 1 Tab by mouth daily. 30 Tab 0    chlorthalidone (HYGROTEN) 25 mg tablet Take 1 Tab by mouth daily. 30 Tab 0    glimepiride (AMARYL) 1 mg tablet Take 1 mg by mouth Daily (before breakfast). Patient holds for Constipation      insulin glargine (LANTUS) 100 unit/mL injection 15 Units by SubCUTAneous route ACB/HS.  insulin aspart (NOVOLOG) 100 unit/mL inpn 10 units before meals. Tid. 3 mL 2    OTHER,NON-FORMULARY, Please provide bedside commode for patient. 1 Each 0    therapeutic multivitamin (THERAGRAN) tablet Take 1 Tab by mouth daily.  linaclotide (LINZESS) 145 mcg cap capsule Take 145 mcg by mouth Daily (before breakfast). Patient holds for Diarrhea       No current facility-administered medications on file prior to visit.         Allergies   Allergen Reactions    Bee Sting [Sting, Bee] Hives    Heparin Analogues Vertigo    No Known Drug Allergies Other (comments)       Past Medical History:   Diagnosis Date    Cancer ([facility] [address]     right breast ca with 12 lymph nodes involved    Diabetes ([address]     Diverticulitis     Hypercholesterolemia     Hypertension     LBBB (left bundle branch block) 5/27/2017    Lymphedema of both lower extremities     Morbid obesity ([address]     Osteoarthritis 10/13/2010    Retinopathy due to secondary diabetes ([address]     Thyroid lump     nodule       Past Surgical History:   Procedure Laterality Date    COLONOSCOPY N/A 8/24/2016    COLONOS[facility] performed [facility] Kylah Silva MD at [facility]  HX CHOLECYSTECTOMY      HX GYN      hysterectomy    HX HYSTERECTOMY      HX MASTECTOMY      HX REFRACTIVE SURGERY      HX RETINAL DETACHMENT REPAIR      UT INS NEW/RPLCMT PRM PM W/TRANSV ELTRD ATRIAL&VENT  5/30/2017            Family History   Problem Relation Age of Onset    Heart Disease Mother     Heart Disease Father        Social History     Social History    Marital status:      Spouse name: N/A    Number of children: N/A    Years of education: N/A     Occupational History    Not on file. Social History Main Topics    Smoking status: Never Smoker    Smokeless tobacco: Never Used    Alcohol use No    Drug use: No    Sexual activity: Yes     Partners: Male     Other Topics Concern    Not on file     Social History Narrative    ** Merged History Encounter **            Admission on 05/27/2017, Discharged on 06/01/2017   No results displayed because visit has over 200 results. Review of Systems   Constitutional: Negative for malaise/fatigue. HENT: Negative for congestion. Respiratory: Negative for cough and wheezing. Cardiovascular: Negative for chest pain and palpitations. Genitourinary: Negative for frequency and urgency. Musculoskeletal: Negative for joint pain and myalgias. Neurological: Negative for weakness and headaches. Psychiatric/Behavioral: Negative for depression and suicidal ideas. The patient is not nervous/anxious. Visit Vitals    /74 (BP 1 Location: Left arm, BP Patient Position: Sitting)    Pulse 87    Temp 98 °F (36.7 °C) (Oral)    Resp 20    Ht 5' 10\" (1.778 m)    Wt 277 lb (125.6 kg)    SpO2 97%    BMI 39.75 kg/m2     Physical Exam   Constitutional: She is oriented to person, place, and time. She appears well-nourished. No distress.    HENT:   Right Ear: External ear normal.   Left Ear: External ear normal.   Mouth/Throat: Oropharynx is clear and moist.   Eyes: Conjunctivae and EOM are normal. Right eye exhibits no discharge. Left eye exhibits no discharge. Neck: Normal range of motion. Neck supple. Cardiovascular: Normal rate and regular rhythm. Pulmonary/Chest: Effort normal and breath sounds normal. She has no wheezes. Abdominal: Soft. Bowel sounds are normal. She exhibits no distension. Musculoskeletal: She exhibits no tenderness. + leg swelling    Neurological: She is alert and oriented to person, place, and time. Skin: Skin is intact. There is erythema. Pacemaker incision site erythematous, non-tender, no oozing   Psychiatric: She has a normal mood and affect. Nursing note and vitals reviewed. ASSESSMENT and PLAN    ICD-10-CM ICD-9-CM    1. Insulin dependent diabetes mellitus (HCC) E11.9 250.00 I want her to continue checking her BS at home and keeping records of it. Z79.4 V58.67    2. S/P cardiac pacemaker procedure Z95.0 V45.01 I discussed that she needs to keep an eye on the incision site and if the redness gets worse then she needs to rtc. 3. Essential hypertension I10 401.9 Her BP is slightly elevated, but I want her to continue taking the medications given to her in the hospital. Written instructions given to her &  about blood pressure medication. 4. Lymphedema of both lower extremities I89.0 457.1 Her legs are less swollen at this time. I want her to continue wrapping her legs. This plan was reviewed with the patient and patient agrees. All questions were answered. This scribe documentation was reviewed by me and accurately reflects the examination and decisions made by me. This note will not be viewable in [address]

## 2017-06-06 NOTE — MR AVS SNAPSHOT
Visit Information Date & Time Provider Department Dept. Phone Encounter #  
 6/6/2017  2:45 PM Regina Santos MD [facility] Internal Medicine [phone] [id number] Your Appointments 6/19/2017  2:15 PM  
PACEMAKER with CHRISTIANE LEE [facility] ([place] SCHEDULING) Appt Note: 2 week wound check [address]  
[address] [facility]  
  
    
 6/19/2017  2:20 PM  
ESTABLISHED PATIENT with Yuni Frank MD  
[facility] ([address]) Appt Note: 2 week wound check [address] [place]  
[address] [address]  
  
    
 6/20/2017  1:00 PM  
Medicare Physical with Regina Santos MD  
[facility] Medicine [address]) Appt Note: [facility] G049  
 [facility] [address] [place] [address],[address] [place] [address]  
  
    
 10/18/2017  1:00 PM  
ESTABLISHED PATIENT with Manuel Primrose, MD  
[facility] ([address]) Appt Note: one year follow up  
 [address] [place] [address] [place]  
[address] [facility] [address] [address] Upcoming Health Maintenance Date Due Hepatitis C Screening 1950 FOOT EXAM Q1 7/31/1959 MICROALBUMIN Q1 7/31/1959 EYE EXAM RETINAL OR DILATED Q1 7/31/1959 DTaP/Tdap/Td series (1 - Tdap) 7/31/1970 BREAST CANCER SCRN MAMMOGRAM 7/31/1999 FOBT Q 1 YEAR AGE 50-75 7/31/1999 ZOSTER VACCINE AGE 60> 7/31/2009 GLAUCOMA SCREENING Q2Y 7/31/2014 OSTEOPOROSIS SCREENING (DEXA) 7/31/2014 Pneumococcal 65+ Low/Medium Risk (1 of 2 - PCV13) 7/31/2014 MEDICARE YEARLY EXAM 7/31/2014 INFLUENZA AGE 9 TO ADULT 8/1/2017 LIPID PANEL Q1 10/19/2017 HEMOGLOBIN A1C Q6M 11/27/2017 Allergies as of 6/6/2017  Review Complete On: 6/6/2017 By: Gm Fields MD  
  
 Severity Noted Reaction Type Reactions Bee Sting [Sting, Bee]  10/13/2010    Hives Heparin Analogues  06/22/2016    Vertigo No Known Drug Allergies  10/13/2010    Other (comments) Current Immunizations  Never Reviewed No immunizations on file. Not reviewed this visit You Were Diagnosed With   
  
 Codes Comments Insulin dependent diabetes mellitus ([facility]    -  Primary ICD-10-CM: E11.9, Z79.4 ICD-9-CM: 250.00, V58.67 S/P cardiac pacemaker procedure     ICD-10-CM: Z95.0 ICD-9-CM: V45.01 Essential hypertension     ICD-10-CM: I10 
ICD-9-CM: 401.9 Lymphedema of both lower extremities     ICD-10-CM: I89.0 ICD-9-CM: 435.7 Vitals BP Pulse Temp Resp Height(growth percentile) Weight(growth percentile) 142/74 (BP 1 Location: Left arm, BP Patient Position: Sitting) 87 98 °F (36.7 °C) (Oral) 20 5' 10\" (1.778 m) 277 lb (125.6 kg) SpO2 BMI OB Status Smoking Status 97% 39.75 kg/m2 Hysterectomy Never Smoker Vitals History BMI and BSA Data Body Mass Index Body Surface Area 39.75 kg/m 2 2.49 m 2 Preferred Pharmacy Pharmacy Name Phone [facility] [address], [address] [place] [address] [phone] Your Updated Medication List  
  
   
This list is accurate as of: 6/6/17  3:43 PM.  Always use your most recent med list.  
  
  
  
  
 atorvastatin 20 mg tablet Commonly known as:  LIPITOR Take 1 Tab by mouth daily. chlorthalidone 25 mg tablet Commonly known as:  [place] K[place]deep Take 1 Tab by mouth daily. glimepiride 1 mg tablet Commonly known as:  AMARYL Take 1 mg by mouth Daily (before breakfast). Patient holds for Constipation  
  
 hydrALAZINE 25 mg tablet Commonly known as:  APRESOLINE Take 1 Tab by mouth three (3) times daily for 30 days. insulin aspart 100 unit/mL Inpn Commonly known as:  Jaja Crape 10 units before meals. Tid. LANTUS 100 unit/mL injection Generic drug:  insulin glargine 15 Units by SubCUTAneous route ACB/HS. LINZESS 145 mcg Cap capsule Generic drug:  linaclotide Take 145 mcg by mouth Daily (before breakfast). Patient holds for Diarrhea  
  
 losartan 50 mg tablet Commonly known as:  COZAAR Take 1 Tab by mouth daily. OTHER(NON-FORMULARY) Please provide bedside commode for patient. psyllium Powd Commonly known as:  METAMUCIL Take  by mouth. therapeutic multivitamin tablet Commonly known as:  [facility] Take 1 Tab by mouth daily. Introducing [facility]! Dear Linda Ngo: Thank you for requesting a [employer] account. Our records indicate that you already have an active [employer] account. You can access your account anytime at [email or web address] Did you know that you can access your hospital and ER discharge instructions at any time in [employer]? You can also review all of your test results from your hospital stay or ER visit. Additional Information If you have questions, please visit the Frequently Asked Questions section of the [employer] website at [email or web address]. Remember, [employer] is NOT to be used for urgent needs. For medical emergencies, dial 911. Now available from your iPhone and Android! Please provide this summary of care documentation to your next provider. Your primary care clinician is listed as Kylee Martinez. If you have any questions after today's visit, please call [phone].

## 2017-06-07 DIAGNOSIS — Z95.0 S/P CARDIAC PACEMAKER PROCEDURE: Primary | ICD-10-CM

## 2017-06-07 NOTE — PROGRESS NOTES
Order placed so that home health consult will go straight to [facility] home health.

## 2017-06-08 ENCOUNTER — PATIENT OUTREACH (OUTPATIENT)
Dept: INTERNAL MEDICINE CLINIC | Age: 67
End: 2017-06-08

## 2017-06-08 NOTE — PROGRESS NOTES
Contacted patient and her  as they did not show up today for their ACP appointment. Patients  reports that their granddaughter is sick in the hospital and they are in the car on the way to [place]. He will call when they return home to reschedule. Will follow.

## 2017-06-19 ENCOUNTER — OFFICE VISIT (OUTPATIENT)
Dept: CARDIOLOGY CLINIC | Age: 67
End: 2017-06-19

## 2017-06-19 ENCOUNTER — CLINICAL SUPPORT (OUTPATIENT)
Dept: CARDIOLOGY CLINIC | Age: 67
End: 2017-06-19

## 2017-06-19 VITALS
HEART RATE: 64 BPM | DIASTOLIC BLOOD PRESSURE: 70 MMHG | HEIGHT: 70 IN | SYSTOLIC BLOOD PRESSURE: 140 MMHG | BODY MASS INDEX: 38.17 KG/M2 | WEIGHT: 266.6 LBS

## 2017-06-19 DIAGNOSIS — E66.01 MORBID OBESITY DUE TO EXCESS CALORIES (HCC): ICD-10-CM

## 2017-06-19 DIAGNOSIS — I44.7 LBBB (LEFT BUNDLE BRANCH BLOCK): ICD-10-CM

## 2017-06-19 DIAGNOSIS — I44.1 MOBITZ TYPE 2 SECOND DEGREE AV BLOCK: ICD-10-CM

## 2017-06-19 DIAGNOSIS — Z95.0 CARDIAC PACEMAKER IN SITU: Primary | ICD-10-CM

## 2017-06-19 DIAGNOSIS — Z95.0 PACEMAKER: Primary | ICD-10-CM

## 2017-06-19 NOTE — MR AVS SNAPSHOT
Visit Information Date & Time Provider Department Dept. Phone Encounter #  
 6/19/2017  2:20 PM Essence Hayden MD [facility] S[facility] [phone] [id number] Your Appointments 6/20/2017  1:00 PM  
Medicare Physical with Maico Nuno MD  
[facility] [address]) Appt Note: [facility] G049  
 [facility] [address] [facility] [address] Reyna Leon [address]  
  
    
 10/18/2017  1:00 PM  
ESTABLISHED PATIENT with Rhett Larson MD  
[facility] ([address]) Appt Note: one year follow up  
 [address] [place] [address]  
[address] [facility] [address] [address] Upcoming Health Maintenance Date Due Hepatitis C Screening 1950 FOOT EXAM Q1 7/31/1959 MICROALBUMIN Q1 7/31/1959 EYE EXAM RETINAL OR DILATED Q1 7/31/1959 DTaP/Tdap/Td series (1 - Tdap) 7/31/1970 BREAST CANCER SCRN MAMMOGRAM 7/31/1999 FOBT Q 1 YEAR AGE 50-75 7/31/1999 ZOSTER VACCINE AGE 60> 7/31/2009 GLAUCOMA SCREENING Q2Y 7/31/2014 OSTEOPOROSIS SCREENING (DEXA) 7/31/2014 Pneumococcal 65+ Low/Medium Risk (1 of 2 - PCV13) 7/31/2014 MEDICARE YEARLY EXAM 7/31/2014 INFLUENZA AGE 9 TO ADULT 8/1/2017 LIPID PANEL Q1 10/19/2017 HEMOGLOBIN A1C Q6M 11/27/2017 Allergies as of 6/19/2017  Review Complete On: 6/19/2017 By: Essence Hayden MD  
  
 Severity Noted Reaction Type Reactions Bee Sting [Sting, Bee]  10/13/2010    Hives Heparin Analogues  06/22/2016    Vertigo No Known Drug Allergies  10/13/2010    Other (comments) Current Immunizations  Never Reviewed No immunizations on file. Not reviewed this visit You Were Diagnosed With   
  
 Codes Comments LBBB (left bundle branch block)    -  Primary ICD-10-CM: I44.7 ICD-9-CM: 426. 3 Vitals BP Pulse Height(growth percentile) Weight(growth percentile) BMI OB Status 140/70 (BP 1 Location: Left arm) 64 5' 10\" (1.778 m) 266 lb 9.6 oz (120.9 kg) 38.25 kg/m2 Hysterectomy Smoking Status Never Smoker Vitals History BMI and BSA Data Body Mass Index Body Surface Area  
 38.25 kg/m 2 2.44 m 2 Preferred Pharmacy Pharmacy Name Phone Atul Billy [address], [address] [place] [address] Avenue [phone] Your Updated Medication List  
  
   
This list is accurate as of: 6/19/17  2:27 PM.  Always use your most recent med list.  
  
  
  
  
 atorvastatin 20 mg tablet Commonly known as:  LIPITOR Take 1 Tab by mouth daily. chlorthalidone 25 mg tablet Commonly known as:  Hiral Baldy Take 1 Tab by mouth daily. glimepiride 1 mg tablet Commonly known as:  AMARYL Take 1 mg by mouth Daily (before breakfast). Patient holds for Constipation  
  
 hydrALAZINE 25 mg tablet Commonly known as:  APRESOLINE Take 1 Tab by mouth three (3) times daily for 30 days. insulin aspart 100 unit/mL Inpn Commonly known as:  Roxanne Damon 10 units before meals. Tid. LANTUS 100 unit/mL injection Generic drug:  insulin glargine 15 Units by SubCUTAneous route ACB/HS. LINZESS 145 mcg Cap capsule Generic drug:  linaclotide Take 145 mcg by mouth Daily (before breakfast). Patient holds for Diarrhea  
  
 losartan 50 mg tablet Commonly known as:  COZAAR Take 1 Tab by mouth daily. OTHER(NON-FORMULARY) Please provide bedside commode for patient. psyllium Powd Commonly known as:  METAMUCIL Take  by mouth. therapeutic multivitamin tablet Commonly known as:  [facility] Take 1 Tab by mouth daily. To-Do List   
 07/06/2017 8:30 AM  
  Appointment with Lele Becerra at [address] ([phone]) Patient Instructions You will need to follow up in clinic with Dr. Diana Ku in 3 months. Introducing [facility] & HEALTH SERVICES! Dear Raya Henson: Thank you for requesting a [employer] account. Our records indicate that you already have an active [employer] account. You can access your account anytime at [email or web address] Did you know that you can access your hospital and ER discharge instructions at any time in Sharetribe? You can also review all of your test results from your hospital stay or ER visit. Additional Information If you have questions, please visit the Frequently Asked Questions section of the Sharetribe website at [email or web address] Remember, Sharetribe is NOT to be used for urgent needs. For medical emergencies, dial 911. Now available from your iPhone and Android! Please provide this summary of care documentation to your next provider. Your primary care clinician is listed as Daysi Peng. If you have any questions after today's visit, please call [phone].

## 2017-06-19 NOTE — PROGRESS NOTES
Wound Check   Patient is here for wound check. No fever, drainage   Physical Exam   Constitutional: well-developed and well-nourished. Skin: Left side pocket is healed without redness, drainage, hematoma       ASSESSMENT and PLAN     ICD-10-CM ICD-9-CM    1. Pacemaker Z95.0 V45.01    2. LBBB (left bundle branch block) I44.7 426.3    3. Morbid obesity due to excess calories (HCC) E66.01 278.01    4.  Mobitz type 2 second degree AV block I44.1 426.12        Device check today  Discussed with her and  about wound care and remote MERLIN    Follow-up Disposition:  Return 3 months I will check via device clinic or remote monitoring in the future  Future Appointments  Date Time Provider Jeni Vidal   6/20/2017 1:00 PM Sonali Goodson MD [address] [place] [address]   7/6/2017 8:30 AM [facility] [address] [place] [address]   10/18/2017 1:00 PM Denice Rothman  [address]       She said she usually will see Dr Mark Anthony Miner

## 2017-06-20 ENCOUNTER — OFFICE VISIT (OUTPATIENT)
Dept: INTERNAL MEDICINE CLINIC | Age: 67
End: 2017-06-20

## 2017-06-20 VITALS
DIASTOLIC BLOOD PRESSURE: 86 MMHG | HEIGHT: 70 IN | RESPIRATION RATE: 16 BRPM | BODY MASS INDEX: 38.2 KG/M2 | TEMPERATURE: 98.3 F | WEIGHT: 266.8 LBS | HEART RATE: 90 BPM | OXYGEN SATURATION: 97 % | SYSTOLIC BLOOD PRESSURE: 152 MMHG

## 2017-06-20 DIAGNOSIS — Z71.89 ACP (ADVANCE CARE PLANNING): ICD-10-CM

## 2017-06-20 DIAGNOSIS — M81.0 POSTMENOPAUSAL BONE LOSS: ICD-10-CM

## 2017-06-20 DIAGNOSIS — F41.0 PANIC ATTACK: ICD-10-CM

## 2017-06-20 DIAGNOSIS — I10 ESSENTIAL HYPERTENSION: ICD-10-CM

## 2017-06-20 DIAGNOSIS — Z12.39 BREAST CANCER SCREENING: ICD-10-CM

## 2017-06-20 DIAGNOSIS — Z00.00 MEDICARE ANNUAL WELLNESS VISIT, SUBSEQUENT: Primary | ICD-10-CM

## 2017-06-20 RX ORDER — LISINOPRIL 20 MG/1
TABLET ORAL DAILY
COMMUNITY
End: 2017-10-11 | Stop reason: SDUPTHER

## 2017-06-20 RX ORDER — ALPRAZOLAM 0.5 MG/1
0.5 TABLET ORAL
Qty: 10 TAB | Refills: 0 | Status: SHIPPED | OUTPATIENT
Start: 2017-06-20 | End: 2017-09-21

## 2017-06-20 NOTE — MR AVS SNAPSHOT
Visit Information Date & Time Provider Department Dept. Phone Encounter #  
 6/20/2017  1:00 PM Nader Austin, [address] [facility],[address] Internal Medicine [phone] [id number] Your Appointments 9/21/2017  1:45 PM  
PACEMAKER with CHRISTIANE LEE [facility] ([facility] SCHEDULING) Appt Note: sjm ppi, rc, chronic ck, M, see [address] [place] [address]  
[address] [place]  
  
    
 9/21/2017  2:00 PM  
ESTABLISHED PATIENT with Faisal Connolly MD  
[facility] Appt Note: sjm ppi, rc, chronic ck, M, see [address] [place] [address]  
[address] [place]  
  
    
 10/18/2017  1:00 PM  
ESTABLISHED PATIENT with Agnes Benson MD  
[facility] ([facility]) Appt Note: one year follow up  
 [address] [facility] [address] [facility] [address]  
[address] [facility] [address]  
  
    
 7/2/2018 12:00 PM  
Medicare Physical with Nader Austin MD  
[facility] Appt Note: [facility] Wellness Visit   
 [facility] [address] [facility] [address]  
[address] [facility] 3100 [facility] [address] Upcoming Health Maintenance Date Due  
 FOOT EXAM Q1 7/31/1959 MICROALBUMIN Q1 7/31/1959 EYE EXAM RETINAL OR DILATED Q1 7/31/1959 DTaP/Tdap/Td series (1 - Tdap) 7/31/1970 BREAST CANCER SCRN MAMMOGRAM 7/31/1999 FOBT Q 1 YEAR AGE 50-75 7/31/1999 ZOSTER VACCINE AGE 60> 7/31/2009 GLAUCOMA SCREENING Q2Y 7/31/2014 OSTEOPOROSIS SCREENING (DEXA) 7/31/2014 Pneumococcal 65+ Low/Medium Risk (1 of 2 - PCV13) 7/31/2014 INFLUENZA AGE 9 TO ADULT 8/1/2017 LIPID PANEL Q1 10/19/2017 HEMOGLOBIN A1C Q6M 11/27/2017 MEDICARE YEARLY EXAM 6/21/2018 Allergies as of 6/20/2017  Review Complete On: 6/20/2017 By: Josr Navarro MD  
  
 Severity Noted Reaction Type Reactions Bee Sting [Sting, Bee]  10/13/2010    Hives Heparin Analogues  06/22/2016    Vertigo No Known Drug Allergies  10/13/2010    Other (comments) Current Immunizations  Never Reviewed No immunizations on file. Not reviewed this visit You Were Diagnosed With   
  
 Codes Comments Medicare annual wellness visit, subsequent    -  Primary ICD-10-CM: Z00.00 ICD-9-CM: V70.0 ACP (advance care planning)     ICD-10-CM: Z71.89 ICD-9-CM: V65.49 Postmenopausal bone loss     ICD-10-CM: M81.0 ICD-9-CM: 733.01 Breast cancer screening     ICD-10-CM: Z12.39 
ICD-9-CM: V76.10 Essential hypertension     ICD-10-CM: I10 
ICD-9-CM: 401.9 Insulin dependent diabetes mellitus (HCC)     ICD-10-CM: E11.9, Z79.4 ICD-9-CM: 250.00, V58.67 Panic attack     ICD-10-CM: F41.0 ICD-9-CM: 300.01 Vitals BP Pulse Temp Resp Height(growth percentile) Weight(growth percentile) 152/86 (BP 1 Location: Left arm, BP Patient Position: Sitting) 90 98.3 °F (36.8 °C) (Oral) 16 5' 10\" (1.778 m) 266 lb 12.8 oz (121 kg) SpO2 BMI OB Status Smoking Status 97% 38.28 kg/m2 Hysterectomy Never Smoker Vitals History BMI and BSA Data Body Mass Index Body Surface Area  
 38.28 kg/m 2 2.44 m 2 Preferred Pharmacy Pharmacy Name Phone Jonna Lopez [address] [phone] Your Updated Medication List  
  
   
This list is accurate as of: 6/20/17  3:28 PM.  Always use your most recent med list.  
  
  
  
  
 ALPRAZolam 0.5 mg tablet Commonly known as:  Mari Cumins Take 1 Tab by mouth nightly as needed for Anxiety for up to 10 doses. Max Daily Amount: 0.5 mg.  
  
 atorvastatin 20 mg tablet Commonly known as:  LIPITOR Take 1 Tab by mouth daily. chlorthalidone 25 mg tablet Commonly known as:  Shefali Riley Take 1 Tab by mouth daily. diph,pertuss(acel),tetanus vac(PF) 2 Lf-(2.5-5-3-5 mcg)-5Lf/0.5 mL Syrg vaccine Commonly known as:  ADACEL  
0.5 mL by IntraMUSCular route once for 1 dose. glimepiride 1 mg tablet Commonly known as:  AMARYL Take 1 mg by mouth Daily (before breakfast). Patient holds for Constipation  
  
 hydrALAZINE 25 mg tablet Commonly known as:  APRESOLINE Take 1 Tab by mouth three (3) times daily for 30 days. insulin aspart 100 unit/mL Inpn Commonly known as:  Que Dryer 10 units before meals. Tid. LANTUS 100 unit/mL injection Generic drug:  insulin glargine 15 Units by SubCUTAneous route ACB/HS. LINZESS 145 mcg Cap capsule Generic drug:  linaclotide Take 145 mcg by mouth Daily (before breakfast). Patient holds for Diarrhea  
  
 lisinopril 20 mg tablet Commonly known as:  Stan Daft Take  by mouth daily. losartan 50 mg tablet Commonly known as:  COZAAR Take 1 Tab by mouth daily. OTHER(NON-FORMULARY) Please provide bedside commode for patient. pneumococcal 13 abhi conj dip 0.5 mL Syrg injection Commonly known as:  PREVNAR-13  
0.5 mL by IntraMUSCular route once for 1 dose. psyllium Powd Commonly known as:  METAMUCIL Take  by mouth. therapeutic multivitamin tablet Commonly known as:  [facility] Take 1 Tab by mouth daily. Prescriptions Printed Refills diph,pertuss,acel,,tetanus vac,PF, (ADACEL) 2 Lf-(2.5-5-3-5 mcg)-5Lf/0.5 mL syrg vaccine 0 Si.5 mL by IntraMUSCular route once for 1 dose. Class: Print Route: IntraMUSCular  
 pneumococcal 13 abhi conj dip (PREVNAR-13) 0.5 mL syrg injection 0 Si.5 mL by IntraMUSCular route once for 1 dose. Class: Print Route: IntraMUSCular ALPRAZolam (XANAX) 0.5 mg tablet 0  Sig: Take 1 Tab by mouth nightly as needed for Anxiety for up to 10 doses. Max Daily Amount: 0.5 mg.  
 Class: Print Route: Oral  
  
We Performed the Following AMB POC URINE, MICROALBUMIN, SEMIQUANT (3 RESULTS) [03452 CPT(R)] To-Do List   
 07/06/2017 8:30 AM  
  Appointment with Latesha Doan at [place] [address]. ([phone])  
  
 07/19/2017 Imaging:  MARTHA MAMMO BI SCREENING INCL CAD   
  
 07/20/2017 Imaging:  DEXA BONE DENSITY STUDY AXIAL Patient Instructions Today's Date -  6/20/17 Medicare Part B Preventive Services Limitations Recommendation/Date completed if known Scheduled/ Next Due Bone Mass Measurement 
(age 72 & older, biennial) Requires diagnosis related to osteoporosis or estrogen deficiency. Biennial benefit unless patient has history of long-term glucocorticoid tx or baseline is needed because initial test was by other method Completed: 
 
Unknown Recommended every 2 years DUE:NOW please call to schedule with the imaging center Cardiovascular Screening Blood Tests (every 5 years) Total cholesterol, HDL, Triglycerides Order as a panel if possible Completed: 
10/2016 Recommended annually DUE:10/2017 Colorectal Cancer Screening 
-Fecal occult blood test (annual) -Flexible sigmoidoscopy (5y) 
-Screening colonoscopy (10y) -Barium Enema  Completed: 
8/24/16 by Dr Mathieu Pugh Recommended every 10 years DUE:8/24/26 Counseling to Prevent Tobacco Use (up to 8 sessions per year) - Counseling greater than 3 and up to 10 minutes - Counseling greater than 10 minutes Patients must be asymptomatic of tobacco-related conditions to receive as preventive service  N/A Prevnar 13 vaccine Not received Due NOW please go to the pharmacy to receive this vaccine TDAP Vaccine Unsure Due NOW please go to the pharmacy to receive this vaccine Shingles Vaccine Not received Please check with your doctor about receiving this vaccine Influenza Melissa Dubs Due Every Fall Pneumonia Vaccine Not received  Will be due 12 months after the prevnar vaccine Diabetes Screening Tests (at least every 3 years, Medicare covers annually or at 6-month intervals for prediabetic patients) Fasting blood sugar (FBS) or glucose tolerance test (GTT) Patient must be diagnosed with one of the following: 
-Hypertension, Dyslipidemia, obesity, previous impaired FBS or GTT 
Or any two of the following: overweight, FH of diabetes, age ? 72, history of gestational diabetes, birth of baby weighing more than 9 pounds Completed: 
 
5/29/17 Recommended annually DUE: 11/29/17 Diabetes Self-Management Training (DSMT) (no USPSTF recommendation) Requires referral by treating physician for patient with diabetes or renal disease. 10 hours of initial DSMT session of no less than 30 minutes each in a continuous 12-month period. 2 hours of follow-up DSMT in subsequent years. n/a Glaucoma Screening (no USPSTF recommendation) Diabetes mellitus, family history, , age 48 or over,  American, age 72 or over Completed: 
5/2017 Recommended annually DUE: 5/2018 Human Immunodeficiency Virus (HIV) Screening (annually for increased risk patients) HIV-1 and HIV-2 by EIA, SLICK, rapid antibody test, or oral mucosa transudate Patient must be at increased risk for HIV infection per USPSTF guidelines or pregnant. Tests covered annually for patients at increased risk. Pregnant patients may receive up to 3 test during pregnancy. N/A Medical Nutrition Therapy (MNT) (fordiabetes or renal disease not recommended schedule) Requires referral by treating physician for patient with diabetes or renal disease. Can be provided in same year as diabetes self-management training (DSMT), and CMS recommends medical nutrition therapy take place after DSMT. Up to 3 hours for initial year and 2 hours in subsequent years. N/A Hepatitis B Vaccinations (if medium/high risk) Medium/high risk factors: End-stage renal disease, Hemophiliacs who received Factor VIII or IX concentrates, Clients of institutions for the mentally retarded, Persons who live in the same house as a HepB virus carrier, Homosexual men, Illicit injectable drug abusers. N/A Screening Mammography (biennial age 54-69)? Annually (age 36 or over) Completed:  
 
Unknown 2012? Recommended annually DUE:Now please call the imaging center to schedule Screening Pap Tests and Pelvic Examination (up to age 79 and after 79 if unknown history or abnormal study last 10 years) Every 24 months except high risk Completed: 
 
 
 
Recommended every 2 years N/A hysterectomy Ultrasound Screening for Abdominal Aortic Aneurysm (AAA) (once) Patient must be referred through [facility] and not have had a screening for abdominal aortic aneurysm before under Medicare. Limited to patients who meet one of the following criteria: 
- Men who are 73-68 years old and have smoked more than 100 cigarettes in their lifetime. 
-Anyone with a FH of AAA 
-Anyone recommended for screening by USPSTF Not covered by  
Medicare as preventive. N/A Thanks for coming in today. It was nice to spend some time with you. If you have any questions about your visit today, please call [phone] and ask to speak with [address] [place] [address],[address]. Please call the office  to set up advance care planning session [phone] Patient Instructions History Introducing [facility]! Dear Wyatt Salinas: Thank you for requesting a [employer] account. Our records indicate that you already have an active [employer] account. You can access your account anytime at [email or web address] Hand Did you know that you can access your hospital and ER discharge instructions at any time in [employer]? You can also review all of your test results from your hospital stay or ER visit. Additional Information If you have questions, please visit the Frequently Asked Questions section of the myGreek website at [email or web address] Remember, myGreek is NOT to be used for urgent needs. For medical emergencies, dial 911. Now available from your iPhone and Android! Please provide this summary of care documentation to your next provider. Your primary care clinician is listed as Karlene Gonzalez. If you have any questions after today's visit, please call [phone].

## 2017-06-20 NOTE — PROGRESS NOTES
NN Medicare Wellness Visit      Evan Lam is a 78 y.o. female and presents for Annual Medicare Wellness Visit. Assessment of cognitive impairment: Alert and oriented x 3. Depression Screen:   PHQ over the last two weeks 6/20/2017   Little interest or pleasure in doing things Nearly every day   Feeling down, depressed or hopeless Nearly every day   Total Score PHQ 2 6   Trouble falling or staying asleep, or sleeping too much Nearly every day   Feeling tired or having little energy Nearly every day   Poor appetite or overeating Several days   Feeling bad about yourself - or that you are a failure or have let yourself or your family down Several days   Trouble concentrating on things such as school, work, reading or watching TV Several days   Moving or speaking so slowly that other people could have noticed; or the opposite being so fidgety that others notice Not at all   Thoughts of being better off dead, or hurting yourself in some way Not at all   PHQ 9 Score 15   How difficult have these problems made it for you to do your work, take care of your home and get along with others Very difficult       Fall Risk Assessment:    Fall Risk Assessment, last 12 mths 6/20/2017   Able to walk? Yes   Fall in past 12 months? Yes   Fall with injury? No   Number of falls in past 12 months 2   Fall Risk Score 2       Abuse Screen:   Abuse Screening Questionnaire 6/20/2017   Do you ever feel afraid of your partner? N   Are you in a relationship with someone who physically or mentally threatens you? N   Is it safe for you to go home? Y       Activities of Daily Living:  Partial assistance.    Requires assistance with: driving, meal prep, bills etc  Patient handle his/her own medications  yes Use of pill box  no  Activities of Daily Living:   ADL Assessment 6/20/2017   Feeding yourself No Help Needed   Getting from bed to chair No Help Needed   Getting dressed No Help Needed   Bathing or showering No Help Needed   Walk across the room (includes cane/walker) No Help Needed   Using the telphone No Help Needed   Taking your medications No Help Needed   Preparing meals No Help Needed   Managing money (expenses/bills) Help Needed   Moderately strenuous housework (laundry) No Help Needed   Shopping for personal items (toiletries/medicines) Help Needed   Shopping for groceries Help Needed   Driving Help Needed   Climbing a flight of stairs No Help Needed   Getting to places beyond walking distances Help Needed       Health Maintenance:  Daily Aspirin: no  Bone Density: not up to date - believes 5 years ago . Will order today patient will schedule with imaging center. Gl[facility] Scre[facility]: [facility] , legally blind will be having eye laser surgery soon   Immunizations:    Tetanus: not up to date - ordered today patient will go to the pharmacy to receive . Influenza: up to date. Shingles: not up to date -    PPSV-23: unknown. will take 12 months after Prevnar Prevnar-13: not up to date - ordered today . Cancer screening:    Cervical: hysterectomy in 1979 none since. Breast: not up to date - ?2012 will order today patient will call the imaging center to schedule. Colon: up to date. Dr Gemma Ramirez done 8/24/16 no polyps repeat in 10 years   Alcohol Risk Screen:   On any occasion during the past 3 months, have you had more than 3 drinks(female) or 4 drinks (male) containing alcohol in one? No  Do you average more than 7 drinks (female) or 14 drinks (male) per week? No  Type and amount:n/a    Hearing Loss:  Mild  hearing loss    Vision Loss:   Wears glasses, contact lenses, or have any other visual impairment  Legally blind     Adult Nutrition Screen:  No risk factors noted. Advance Care Planning:   End of Life Planning: has NO advanced directive  - add't info requested.  Provided info packet for honoring choices  Sesar Fischer ACP-Facilitator appointment yes      Medications/Allergies: Reviewed with patient  Prior to Admission medications    Medication Sig Start Date End Date Taking? Authorizing Provider   lisinopril (PRINIVIL, ZESTRIL) 20 mg tablet Take  by mouth daily. Yes Historical Provider   psyllium (METAMUCIL) powd Take  by mouth. Yes Historical Provider   hydrALAZINE (APRESOLINE) 25 mg tablet Take 1 Tab by mouth three (3) times daily for 30 days. 6/1/17 7/1/17 Yes Sanjuana Bhandari NP   losartan (COZAAR) 50 mg tablet Take 1 Tab by mouth daily. 6/1/17  Yes Sanjuana Bhandari NP   insulin glargine (LANTUS) 100 unit/mL injection 15 Units by SubCUTAneous route ACB/HS. Yes Phys Other, MD   therapeutic multivitamin (THERAGRAN) tablet Take 1 Tab by mouth daily. Yes She Other, MD   insulin aspart (NOVOLOG) 100 unit/mL inpn 10 units before meals. Tid. 3/2/17  Yes Maximiliano Veronica NP   atorvastatin (LIPITOR) 20 mg tablet Take 1 Tab by mouth daily. 6/1/17   Sanjuana Bhandari NP   chlorthalidone (HYGROTEN) 25 mg tablet Take 1 Tab by mouth daily. 6/1/17   KELI Guy, Please provide bedside commode for patient. 6/1/17   Sanjuana Bhandari NP   glimepiride (AMARYL) 1 mg tablet Take 1 mg by mouth Daily (before breakfast). Patient holds for Constipation    Phys Other, MD   linaclotide (LINZESS) 145 mcg cap capsule Take 145 mcg by mouth Daily (before breakfast). Patient holds for Diarrhea    Phys Other, MD       Allergies   Allergen Reactions    Bee Sting [Sting, Bee] Hives    Heparin Analogues Vertigo    No Known Drug Allergies Other (comments)       Objective:  Visit Vitals    BP (!) 150/86 (BP 1 Location: Left arm, BP Patient Position: Sitting)    Pulse 90    Temp 98.3 °F (36.8 °C) (Oral)    Resp 16    Ht 5' 10\" (1.778 m)    Wt 266 lb 12.8 oz (121 kg)    SpO2 97%    BMI 38.28 kg/m2    Body mass index is 38.28 kg/(m^2). Problem List: Reviewed with patient and discussed risk factors.     Patient Active Problem List   Diagnosis Code    Insulin dependent diabetes mellitus ([facility] [address] E11.9, Z79.4    Essential hypertension with goal blood pressure less than 140/90 I10    History of right breast cancer Z85.3    Lymphedema of both lower extremities I89.0    MAXWELL (dyspnea on exertion) R06.09    Joint pain M25.50    Hypertension I10    Morbidly obese (HCC) E66.01    Osteoarthritis M19.90    Syncope R55    Abdominal pain R10.9    Alternating constipation and diarrhea R19.8    Lymphedema I89.0    LBBB (left bundle branch block) I44.7       PSH: Reviewed with patient  Past Surgical History:   Procedure Laterality Date    COLONOSCOPY N/A 8/24/2016    COL[facility] ENDOSCOPY    HX CATARACT REMOVAL      HX CHOLECYSTECTOMY      HX GYN      hysterectomy    HX HYSTERECTOMY      HX MASTECTOMY      HX REFRACTIVE SURGERY      HX RETINAL DETACHMENT REPAIR      IA INS NEW/RPLCMT PRM PM W/TRANSV ELTRD ATRIAL&VENT  5/30/2017             SH: Reviewed with patient  Social History   Substance Use Topics    Smoking status: Never Smoker    Smokeless tobacco: Never Used    Alcohol use No       FH: Reviewed with patient  Family History   Problem Relation Age of Onset    Heart Disease Mother     Heart Disease Father        Current medical providers:    Patient Care Team:  Kristie Adhikari MD as PCP - General (Internal Medicine)  Sammi Garcia MD (Cardiology)  Baldev Alberto MD (Cardiology)  Augusto Adams RN as Ambulatory Care Navigator  Edgar Zee MD (Ophthalmology)      Plan:    Perry Campbell was seen today for annual wellness visit. Diagnoses and all orders for this visit:    Medicare annual wellness visit, subsequent  -     diph,pertuss,acel,,tetanus vac,PF, (ADACEL) 2 Lf-(2.5-5-3-5 mcg)-5Lf/0.5 mL syrg vaccine; 0.5 mL by IntraMUSCular route once for 1 dose. -     pneumococcal 13 abhi conj dip (PREVNAR-13) 0.5 mL syrg injection; 0.5 mL by IntraMUSCular route once for 1 dose.     ACP (advance care planning)  Advanced directive discussed with her & her  , she will make an appointment with [facility] to complete the forms. Postmenopausal bone loss  -     DEXA BONE DENSITY STUDY AXIAL; Future    Breast cancer screening  -     MARTHA MAMMO BI SCREENING INCL CAD; Future    Essential hypertension  She has not taken Hydralazine today. Insulin dependent diabetes mellitus (HCC)  -     AMB POC URINE, MICROALBUMIN, SEMIQUANT (3 RESULTS)        Orders Placed This Encounter    lisinopril (PRINIVIL, ZESTRIL) 20 mg tablet       Health Maintenance   Topic Date Due    Hepatitis C Screening  1950    FOOT EXAM Q1  07/31/1959    MICROALBUMIN Q1  07/31/1959    EYE EXAM RETINAL OR DILATED Q1  07/31/1959    DTaP/Tdap/Td series (1 - Tdap) 07/31/1970    BREAST CANCER SCRN MAMMOGRAM  07/31/1999    FOBT Q 1 YEAR AGE 50-75  07/31/1999    ZOSTER VACCINE AGE 60>  07/31/2009    GLAUCOMA SCREENING Q2Y  07/31/2014    OSTEOPOROSIS SCREENING (DEXA)  07/31/2014    Pneumococcal 65+ Low/Medium Risk (1 of 2 - PCV13) 07/31/2014    INFLUENZA AGE 9 TO ADULT  08/01/2017    LIPID PANEL Q1  10/19/2017    HEMOGLOBIN A1C Q6M  11/27/2017    MEDICARE YEARLY EXAM  06/21/2018          Urinary/ Fecal Incontinence: occasional fecal/urinary incontinence     Regular physical exercise: walking around the house    Patient verbalized understanding of information presented. AVS and Medicare Part B Preventive Services Table printed and given to pt and reviewed. See table for findings under Recommendation and Scheduled. All of the patient's questions were answered. Thinks she has been having frequent panic attacks since pacemaker has placed. Would like to take something to relax her nerves.

## 2017-06-20 NOTE — ACP (ADVANCE CARE PLANNING)
Advance Care Planning (ACP) Provider Conversation Snapshot    Date of ACP Conversation: 06/20/17  Persons included in Conversation:  patient  Length of ACP Conversation in minutes:  16 minutes          For Patients with Decision Making Capacity:   Values/Goals: Exploration of values, goals, and preferences if recovery is not expected, even with continued medical treatment in the event of:  Imminent death    Conversation Outcomes / Follow-Up Plan:   Recommended completion of Advance Directive form after review of ACP materials and conversation with prospective healthcare agent

## 2017-06-20 NOTE — PATIENT INSTRUCTIONS
Today's Date -  6/20/17  Medicare Part B Preventive Services Limitations Recommendation/Date completed if known Scheduled/ Next Due   Bone Mass Measurement  (age 72 & older, biennial) Requires diagnosis related to osteoporosis or estrogen deficiency. Biennial benefit unless patient has history of long-term glucocorticoid tx or baseline is needed because initial test was by other method Completed:    Unknown   Recommended every 2 years DUE:NOW please call to schedule with the imaging center    Cardiovascular Screening Blood Tests (every 5 years)  Total cholesterol, HDL, Triglycerides Order as a panel if possible Completed:  10/2016    Recommended annually DUE:10/2017   Colorectal Cancer Screening  -Fecal occult blood test (annual)  -Flexible sigmoidoscopy (5y)  -Screening colonoscopy (10y)  -Barium Enema  Completed:  8/24/16 by Dr Kandice Chacko       Recommended every 10 years DUE:8/24/26   Counseling to Prevent Tobacco Use (up to 8 sessions per year)  - Counseling greater than 3 and up to 10 minutes  - Counseling greater than 10 minutes Patients must be asymptomatic of tobacco-related conditions to receive as preventive service  N/A   Prevnar 13 vaccine    Not received   Due NOW please go to the pharmacy to receive this vaccine     TDAP Vaccine    Unsure Due NOW please go to the pharmacy to receive this vaccine   Shingles Vaccine        Not received Please check with your doctor about receiving this vaccine   Influenza Vaecine        Due Every Fall   Pneumonia Vaccine      Not received  Will be due 12 months after the prevnar vaccine    Diabetes Screening Tests (at least every 3 years, Medicare covers annually or at 6-month intervals for prediabetic patients)    Fasting blood sugar (FBS) or glucose tolerance test (GTT)       Patient must be diagnosed with one of the following:  -Hypertension, Dyslipidemia, obesity, previous impaired FBS or GTT  Or any two of the following: overweight, FH of diabetes, age ? 65, history of gestational diabetes, birth of baby weighing more than 9 pounds Completed:    5/29/17    Recommended annually DUE: 11/29/17   Diabetes Self-Management Training (DSMT) (no USPSTF recommendation) Requires referral by treating physician for patient with diabetes or renal disease. 10 hours of initial DSMT session of no less than 30 minutes each in a continuous 12-month period. 2 hours of follow-up DSMT in subsequent years. n/a   Glaucoma Screening (no USPSTF recommendation) Diabetes mellitus, family history, , age 48 or over,  American, age 72 or over Completed:  5/2017      Recommended annually DUE: 5/2018   Human Immunodeficiency Virus (HIV) Screening (annually for increased risk patients)  HIV-1 and HIV-2 by EIA, SLICK, rapid antibody test, or oral mucosa transudate Patient must be at increased risk for HIV infection per USPSTF guidelines or pregnant. Tests covered annually for patients at increased risk. Pregnant patients may receive up to 3 test during pregnancy. N/A   Medical Nutrition Therapy (MNT) (fordiabetes or renal disease not recommended schedule) Requires referral by treating physician for patient with diabetes or renal disease. Can be provided in same year as diabetes self-management training (DSMT), and CMS recommends medical nutrition therapy take place after DSMT. Up to 3 hours for initial year and 2 hours in subsequent years. N/A   Hepatitis B Vaccinations (if medium/high risk) Medium/high risk factors:  End-stage renal disease,  Hemophiliacs who received Factor VIII or IX concentrates, Clients of institutions for the mentally retarded, Persons who live in the same house as a HepB virus carrier, Homosexual men, Illicit injectable drug abusers. N/A   Screening Mammography (biennial age 54-69)? Annually (age 36 or over) Completed:     Unknown 2012?   Recommended annually DUE:Now please call the imaging center to schedule   Screening Pap Tests and Pelvic Examination (up to age 79 and after 79 if unknown history or abnormal study last 10 years) Every 24 months except high risk Completed:        Recommended every 2 years N/A hysterectomy     Ultrasound Screening for Abdominal Aortic Aneurysm (AAA) (once)   Patient must be referred through IPPE and not have had a screening for abdominal aortic aneurysm before under Medicare. Limited to patients who meet one of the following criteria:  - Men who are 73-68 years old and have smoked more than 100 cigarettes in their lifetime.  -Anyone with a FH of AAA  -Anyone recommended for screening by USPSTF   Not covered by   Medicare as preventive. N/A   Thanks for coming in today. It was nice to spend some time with you. If you have any questions about your visit today, please call [phone] and ask to speak with Gisselle Mcgovern.      Please call the office  to set up advance care planning session [phone]

## 2017-06-20 NOTE — PROGRESS NOTES
Chief Complaint   Patient presents with   [facility] Wellness Visit

## 2017-06-28 ENCOUNTER — DOCUMENTATION ONLY (OUTPATIENT)
Dept: INTERNAL MEDICINE CLINIC | Age: 67
End: 2017-06-28

## 2017-06-28 NOTE — PROGRESS NOTES
Noted,  Jason Sigala will end up contacting them ( if they haven't already) and will cancel what they are requesting. No call made at this time.

## 2017-06-28 NOTE — PROGRESS NOTES
Pt's  called to inform our office that the pt will not be going to have the imaging done. The entire phone encounter was extremely muffled and this writer was unable to hear what the pt's  was stating. This writer encouraged  to call and reschedule/cancel those imaging scheduled. This writer also instructed to call office back due to the inability to hear the pt's . During phone encounter, this writer did ask pt's  if he needed to speak to a nurse and did not need to.

## 2017-07-14 ENCOUNTER — HOSPITAL ENCOUNTER (OUTPATIENT)
Dept: MAMMOGRAPHY | Age: 67
Discharge: HOME | End: 2017-07-14
Attending: INTERNAL MEDICINE
Payer: MEDICARE

## 2017-07-14 DIAGNOSIS — Z12.31 VISIT FOR SCREENING MAMMOGRAM: ICD-10-CM

## 2017-07-14 DIAGNOSIS — M81.0 POSTMENOPAUSAL BONE LOSS: ICD-10-CM

## 2017-07-14 PROCEDURE — 77080 DXA BONE DENSITY AXIAL: CPT

## 2017-07-14 PROCEDURE — 77063 BREAST TOMOSYNTHESIS BI: CPT

## 2017-07-19 ENCOUNTER — HOSPITAL ENCOUNTER (OUTPATIENT)
Dept: PHYSICAL THERAPY | Age: 67
Discharge: HOME | End: 2017-07-19
Payer: MEDICARE

## 2017-07-19 VITALS — HEIGHT: 68 IN | BODY MASS INDEX: 40.65 KG/M2 | WEIGHT: 268.2 LBS

## 2017-07-19 PROCEDURE — G8991 OTHER PT/OT GOAL STATUS: HCPCS

## 2017-07-19 PROCEDURE — G8990 OTHER PT/OT CURRENT STATUS: HCPCS

## 2017-07-19 PROCEDURE — 97162 PT EVAL MOD COMPLEX 30 MIN: CPT

## 2017-07-19 PROCEDURE — 97140 MANUAL THERAPY 1/> REGIONS: CPT

## 2017-07-19 NOTE — PROGRESS NOTES
[facility]  Physical Therapy Lymphedema Clinic  [address], [address] [place] [address], [facility] 22.    OUTPATIENT physical Therapy Evaluation with CMS G codes    NAME: Alpesh Griffin AGE: 78 y.o. GENDER: female  DATE: 7/19/2017  REFERRING PHYSICIAN:  Dr. Velez Fail:   Primary Diagnosis:  · B LE lymphedema, secondary (I89.0)  Other Treatment Diagnoses:  · Abnormality of gait (R26.9)  · Swelling not relieved by elevation (R60.9)  · Malignant neoplasm of breast with lumpectomy or mastectomy (C50.919)  · Morbid obesity (E66.01)  · Diabetic condition (E11.9)  · Long Term (Current) Use of Insulin  Date of Onset: 6/29/2016  Patient returns to lymphedema clinic to be reassessed and to be measured for new compression garments. Present Symptoms and Functional Limitations:  Patient presents with chronic bilateral lower extremity swelling with skin changes and intermittent pain. She reports her legs become more painful when she does not wear compression for and extended period of time. Decreased ambulation tolerance is also reported along with decreased functional mobility. Patient is unable to don or doff her shoes independently. She had a recent hospitalization for pacemaker placement. Her  has been compression bandaging her bilaterally the past several weeks and they are requesting to be measured for velcro style compression garments today. She also reports mild right arm swelling secondary to right mastectomy, but that it is not painful. 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 Lymphedema Life Impact Scale: Score of 37 and impairment percentage of 54. See scanned document.    Past Medical History:   Past Medical History:   Diagnosis Date    Cancer [facility])     right breast ca with 12 lymph nodes involved    Diabetes ([facility] [address]     Diverticulitis     Hypercholesterolemia     Hypertension     LBBB (left bundle branch block) 5/27/2017    Lymphedema of both lower extremities     [facility]bid [facility] [address]     Osteoarthritis 10/13/2010    Psychotic disorder     panic attacks    Radiation therapy complication 7703    and chemo    Retinopathy due to secondary diabetes ([facility] [address]     Thyroid lump     nodule   Visual deficits are present          Past Surgical History:   Procedure Laterality Date    COLONOSCOPY N/A 8/24/2016    COLONOSCOPY performed by Gabi Forde MD at [address] HX CATARACT REMOVAL      HX CHOLECYSTECTOMY      HX GYN      hysterectomy    HX HYSTERECTOMY      HX MASTECTOMY Right 2012    HX REFRACTIVE SURGERY      HX RETINAL DETACHMENT REPAIR      MARTHA STEREO  BX BREAST LT 1ST LESION W/CLIP AND SPECIMEN  2005    [place] INS NEW/RPLCMT PRM PM W/TRANSV ELTRD ATRIAL&VENT  5/30/2017          Current Medications:    Current Outpatient Prescriptions   Medication Sig    lisinopril (PRINIVIL, ZESTRIL) 20 mg tablet Take  by mouth daily.  ALPRAZolam (XANAX) 0.5 mg tablet Take 1 Tab by mouth nightly as needed for Anxiety for up to 10 doses. Max Daily Amount: 0.5 mg.  psyllium (METAMUCIL) powd Take  by mouth.  atorvastatin (LIPITOR) 20 mg tablet Take 1 Tab by mouth daily.  losartan (COZAAR) 50 mg tablet Take 1 Tab by mouth daily.  chlorthalidone (HYGROTEN) 25 mg tablet Take 1 Tab by mouth daily. Alvaro Marie, Please provide bedside commode for patient.  glimepiride (AMARYL) 1 mg tablet Take 1 mg by mouth Daily (before breakfast). Patient holds for Constipation    insulin glargine (LANTUS) 100 unit/mL injection 15 Units by SubCUTAneous route ACB/HS.  therapeutic multivitamin (THERAGRAN) tablet Take 1 Tab by mouth daily.  linaclotide (LINZESS) 145 mcg cap capsule Take 145 mcg by mouth Daily (before breakfast). Patient holds for Diarrhea    insulin aspart (NOVOLOG) 100 unit/mL inpn 10 units before meals. Tid.      No current facility-administered medications for this encounter. Patient reports she has stopped taking her medications at this time and she is only taking her insulin. Allergies: Allergies   Allergen Reactions    Bee Sting [Sting, Bee] Hives    Heparin Analogues Vertigo    No Known Drug Allergies Other (comments)      Social/Work History and Prior Level of Function:  Originally from [place] and she and her  reside with their son and his family   Living Situation:  Resides with  in single level home      Trainable Caregiver?:  Yes - , currently wrapping legs with multi-layer compression bandaging   Self-care/ADLs:   assists with footwear and socks     Mobility: ambulates with straight cane   Sleeping Arrangement:   Sleeps for short periods of time, starts the night in bed and moves to a chair due to restlessness   Adaptive Equipment Owned: grab bars in all bathrooms, straight cane     Previous Therapy:  Patient was [facility] at [facility] Lymphedema program July and August of 2016  Compression/Lymphedema Equipment:  Applying multi-layer compression bandaging intermittently as needed. Also have compression knee highs. Patient arrived with multi-layer compression bandaging in place bilaterally foot to knee. SUBJECTIVE:  We have been wrapping here and there but consistently the last few weeks. I think my legs have improved and my skin is better. We are ready to order and buy those compression garments with the velcro. Patients goals for therapy: To be measured for velcro style compression garments. EVALUATION AND OBJECTIVE DATA SUMMARY:   Pain: Patient reports pain increases when compression is not used for extended periods of pain. No leg pain is currently reported.   Pain Scale 1: Numeric (0 - 10)     Pain Intensity 1: 0                 Patient Stated Pain Goal: 0       Self-Care and ADLs:  Grooming: Independent Bathing: Independent   UB Dressing: Independent LB Dressing: Requires some assistance   Don/Doff Shoes/Socks: Requires assistance of  Toileting: Independent   Other: N/A    Skin and Tissue Assessment:  Dermal Status:  (x )  Intact ( )  Dry   ( )  Tenuous ( )  Flaky   ( )  Wound/lesion present ( )  Scars   ( )  Dermatitis    Texture/Consistency:  (x )  Boggy ( )  Pitting Edema   ( )  Brawny ( )  Combination   ( )  Fibrotic/Woody    Pigmentation/Color Change:  ( )  Normal ( )  Hemosiderin   ( )  Red ( )  Erythematous   ( x)  Hyperpigmented (x )  Hyperlipodermatosclerosis with improvement since patient was seen in 2016   Anomalies: None noted  ( )  Lymphorrhea ( )  Vesicles   ( )  Petechiae ( )  Warty Vercusis   ( )  Bullae ( )  Papilloma   Circulatory:  ( )  ELLEN ( )  Varicosities:   (x )  Pulses -pedal pulses palpable bilaterally (x )  Vascular studies ruled out DVT in past   ( )  DVT History    Nails:  (x )  Normal- but first toe toenails are altered in appearance and bruising is noted in the area today  ( )  Fungus  Stemmers Sign: Not assessed today  Height:  Height: 5' 8\" (172.7 cm)  Weight:  Weight: 121.7 kg (268 lb 3.2 oz)   BMI:  BMI (calculated): 40.8  (36 or greater: adversely affecting lymphedema)  Wound/Ulcer:  None noted      Wound Pain:   N/A  Volumetric Measurements:   Right:  10,655.30 mL Left:  11,570.99 mL   % Difference: 8.59 Dominance: N/A   (See scanned graph)  Right lower extremity has decreased by 470.13 ml and left lower extremity 804.62 ml since 8/29/16 and right lower extremity has decreased by 874.96ml and left lower extremity has decreased by 881. 56ml since 7/7/16. Range of Motion:  Decreased active range of motion observed bilateral legs with left less mobile than the right  Strength:  Decreased strength is noted bilaterally with left weaker than right. Patient has difficulty with knee flexion and hip flexion in seated position on the left side.   Sensation:  Impaired, Patient has impaired vision and has difficulty seeing details, reports seeing the \"edges of things\"  Mobility:  Modified independence  Independent  Bed/Chair Mobility:  Independent Transfers:   Modified Independent   Sitting Balance:  Good Standing Balance:  Fair   Gait:  Ambulates with straight cane household distances Wheelchair Mobility:  N/A   Endurance:   Fair Stairs:  N/A       Safety:  Patient is alert and oriented:  Yes   Safety awareness:  Good   Fall Risk?:  Yes due to visual deficits and    Patient given written fall prevention handout: Yes   Precautions:  Standard lymphedema precautions to include avoiding blood pressure readings, injections and IVs or other procedures/acts that could lead to broken skin on affected area, and avoiding excessive heat, resistive activity or altitude without compression garment    Based on the above components, the patient evaluation is determined to be of the following complexity level:  MODERATE  Evaluation Time: 6596-0926   60 minutes    TREATMENT PROVIDED:   1. Treatment description:  Manual Therapy: Patient instructed in skin care principles and anatomy of the lymphatic system. Therapist able to demonstrate manual lymphatic drainage techniques for the drainage of  Bilateral lower legs and skin care protocol: utilizing basic lotion. Discussed compression garment brands and options with demonstration and donning of sample Biacare and Circaid velcro style compression garments and various foot pieces and sock options. Patient is opting for Circaid Juxtafits with anklet and pack band for each leg. Patient was measured for bilateral lower extremity Circaid Juxtafits in a Medium full calf long. Order was placed with [employer] compression. Followed with application of multi-layer bandaging to bilateral lower extremities foot to knee with tricofix, fleece, foam roll ankle to knee, transelast to left toes 1-3, short stretch bandaged sizes 8cm, 10cm and 12 cm.   Patient and her  are to continue with home compression bandaging and return for garment fitting and training in a few weeks. Treatment time:  7817-6218  Minutes: 39    ASSESSMENT:   Ev Adamson is a 78 y.o. female who presents with chronic bilateral lower extremity lymphedema. Patient presents with skin changes with past episodes of weeping, but no history of cellulitis. Her skin has improved significantly since she and her  have been applying compression bandaging at home. She has significant visual deficits. Her condition is complicated by HBP, diabetes and a history of right breast cancer. Patient will benefit from complete decongestive therapy (CDT) including manual lymphatic drainage (MLD) technique, short-stretch textile bandages/compression system to decongest limb as appropriate. Patient will receive instruction in proper skin care to recognize signs/symptoms of and prevent infection, therapeutic exercise, and self-MLD for independent home program and restorative lymphatic performance. This care is medically necessary due to the infection risk with lymphedema and to improve functional activities. CDT is necessary to resolve swelling to allow patient to return to wearing normal clothes/footwear and prevent worsening of symptoms, such as venous stasis ulcerations, infections, or hospitalizations. Patient will be independent with home program strategies to allow improved ADL ability and mobility and to allow patient to return to greatest functional independence. Rehabilitation potential is considered to be Good. Factors which may influence rehabilitation potential include her HBP and visual deficits. Patient will benefit from 3-5 physical therapy visits over 8-10 weeks to optimize improvement in these areas.     PLAN OF CARE:   Recommendations and Planned Interventions:  Manual lymph drainage/complete decongestive therapy  Multi-layer compression bandaging (short-stretch)  Compression garment fitting/provision  Lymphedema therapeutic exercise  AROM/PROM/Strength/Coordination  Self-care training  Education in skin care and lymphedema precautions  Self-MLD education per home program  Self-bandaging education per home program  Caregiver education as needed  Wound care as needed     GOALS  Short term goals  Time frame: 8/18/17  1. Patient will demonstrate knowledge of signs/symptoms of infections/cellulitis and be independent in skin care to prevent cellulitis. 2.  Patient will be independent with donning/doffing of compression system and use in order to prevent reaccumulation of fluid at discharge. Long term goals  Time frame: 10/15/17  1. Pt will show improvement in Lymphedema Life Impact Scale by decreasing impairment percentage to under 50  and thus allow improvement in patient's quality of life. 2.  Pt will be independent in self-MLD and show stable limb volumes showing decongestion and pt. ready for transition to independent restorative phase of lymphedema therapy. In compliance with CMSs Claims Based Outcome Reporting, the following G-code set was chosen for this patient based on their primary functional limitation being treated: The outcome measure chosen to determine the severity of the functional limitation was the Lymphedema Life Impact Scale with a score of 37 and a percentage of 54 which was correlated with the impairment scale. ? Other PT/OT Primary Functional Limitations:     - CURRENT STATUS: CK - 40%-59% impaired, limited or restricted    - GOAL STATUS:  CJ - 20%-39% impaired, limited or restricted    - D/C STATUS:  ---------------To be determined---------------     Patient has participated in goal setting and agrees to work toward plan of care. Patient was instructed to call if questions or concerns arise.     Thank you for this referral.  FLORI Yeager, BERNADINE-TING      Time Calculation: 105 mins    TREATMENT PLAN EFFECTIVE DATES:   7/19/2017 TO  10/15/17  I have read the above plan of care for Chago Serrato. I certify the above prescribed services are required by this patient and are medically necessary.   The above plan of care has been developed in conjunction with the lymphedema/physical therapist.       Physician Signature: ____________________________________Date:______________                                 Dr. Nano Rodriguez

## 2017-07-26 ENCOUNTER — PATIENT OUTREACH (OUTPATIENT)
Dept: INTERNAL MEDICINE CLINIC | Age: 67
End: 2017-07-26

## 2017-07-26 NOTE — PROGRESS NOTES
NN contacted patient for follow up on how she is doing with pacemaker prior to closing Post ED NN episode. EMR reviewed. Patients last OV was on  6.20/17. Patient declined [facility] assistance and is currently going to outpatient PT . Patient has not been noted to have any further ED visits or hospital admissions since.      NN attempted to reach the patient for follow up

## 2017-08-07 ENCOUNTER — HOSPITAL ENCOUNTER (OUTPATIENT)
Dept: PHYSICAL THERAPY | Age: 67
Discharge: HOME | End: 2017-08-07
Payer: MEDICARE

## 2017-08-07 VITALS — BODY MASS INDEX: 39.4 KG/M2 | WEIGHT: 260 LBS | HEIGHT: 68 IN

## 2017-08-07 PROCEDURE — G8992 OTHER PT/OT  D/C STATUS: HCPCS

## 2017-08-07 PROCEDURE — G8991 OTHER PT/OT GOAL STATUS: HCPCS

## 2017-08-07 PROCEDURE — 97140 MANUAL THERAPY 1/> REGIONS: CPT

## 2017-08-07 NOTE — PROGRESS NOTES
[facility]  Physical Therapy Lymphedema Clinic  [address], [address] [place] [address], [facility].    LYmphedema Therapy  Visit: 2    []                  Daily note               [x]    Discharge to 6 month home restorative program      NAME: Erma Mendoza  DATE: 8/7/2017     GOALS      Short term goals  Time frame: 8/18/17  1. Patient will demonstrate knowledge of signs/symptoms of infections/cellulitis and be independent in skin care to prevent cellulitis. Patient and her  have verbalized independence with the signs and symptoms of cellulitis. They are performing skin  Care independently at home. Goal met 8/7/17.  2.  Patient will be independent with donning/doffing of compression system and use in order to prevent reaccumulation of fluid at discharge. They have been educated in both compression bandaging techniques and velcro style compression garment donning and doffing and they are independent with both techniques. Goal met 8/7/17.     Long term goals  Time frame: 10/15/17  1. Pt will show improvement in Lymphedema Life Impact Scale by decreasing impairment percentage to under 50  and thus allow improvement in patient's quality of life. Patient scores 34% today versus 54% on evaluation. Goal met 8/7/17. 2.  Pt will be independent in self-MLD and show stable limb volumes showing decongestion and pt. ready for transition to independent restorative phase of lymphedema therapy. Patient and her  are independent with all aspects of lymphedema self management and they have received new velcro style compression garments for daily wear. Patient's girths are decreased today and her limb volumes are decreased since she was first treated in therapy in 2016. Patient is ready for transition to 6 month home restorative program.  Goal met 8/7/17. SUBJECTIVE REPORT:  My legs look so much better than what they had been. The color is even better.   We got the new garments and we have tried them at home. They will make things a lot easier. Pain:  Pain Scale 1: Numeric (0 - 10)     Pain Intensity 1: 0                 Patient Stated Pain Goal: 0     Gait:  Independent ambulation with straight cane  ADLs:  Patient requires assistance for footwear and socks  Treatment Response:  Patient's  has been applying mulit-layer compression bandaging regularly and they reporting reapplying bandaging every 1-3 days depending on activity. They received the new velcro style compression garments and return for a fitting today. Function:   Patient arrived with good compression bandaging in place and her  is skilled in bandage application. Lymphedema Life Impact Scale:  Scores 23 with 34% impairment today versus 54% on 7/191/7. TREATMENT AND OBJECTIVE DATA SUMMARY:   Patient/Family Education:       Educated in skin care: Reviewed skin care principles with patient and her  independent. Educated in exercise:  Exercises were previously instructed and patient demonstrated independence. Not reviewed today. Instructed in self MLD: Previously educated in basics of manual lymph drainage. They have the Painting pump in place at home as well and are skilled in product use. Instructed in don/doff of compression system:   Multi layer bandage (MLB) donning principles, donning techniques and wear precautions, product laundering and wearing schedule with  demonstrating good bandaging technique. Educated patient and her  in step by step donning of new Circaid Juxtafit lower leggings with compression anklet, PAC band and cover up. Reviewed garment lifespan, day and night wearing schedule with removal for skin checks twice a day, garment laundering, and step by step donning with education in the use of the compression card to check for proper tension. Educated them in how to monitor the skin for safe product use.      Therapeutic Activity 0 minutes   Treatment time: 0  Functional Mobility: N/A   Fall risk: N/A     Therapeutic Exercise/Procedure 0 minutes   Treatment time: N/A   Carlie ball exercise program:  N/A today    Stick exercise program: N/A   Free exercises/ROM: Patient performed knee extension and ankle pumps during session today. Home program:  Patient to perform daily to BID skin care, deep abdominal breathing, exercise routine, pump use, and donning and doffing of Circaid Juxtafits with skin checks twice a day. Rationale: Exercise will increase the lymph angiomotoricity and tissue pressure of the skin and thus decrease swelling. Modalities 0 minutes   Treatment time: N/A   Vasopneumatic pump: Patient has a basic pump in place at home for daily use. Manual Lymphatic Drainage (MLD) 60 minutes   Treatment time:  7030-3065  Area to decongest: Bilateral lower extremities   Sequence used and effectiveness: Deferred today for education in compression garment donning and doffing and use. Patient has been educated in self manual lymph drainage in the past.   Skin/wound care/debridement:  Skin is 100% intact with decreased discoloration on the anterior lower legs bilaterally. Patient arrived with good compression bandaging in place foot to knee bilaterally today. Bandaging was removed and lower legs were bathed with Dove soap. Performed skin care with low pH lotion. Upper/Lower extremity compression:  Educated patient and her  in step by step donning of new Circaid Juxtafit lower leggings with compression anklet, PAC band and cover up. Reviewed garment lifespan, day and night wearing schedule with removal for skin checks twice a day, garment laundering, and step by step donning with education in the use of the compression card to check for proper tension. Educated them in how to monitor the skin for safe product use.  Patient's  is skilled with compression use and declines the opportunity to practice today stating \"you answered my questions and we have already used them at home. I got it and have no problems or questions about using the products. \"   Kinesiotaping: Not applied today. Girth/Volume measurement: Reassessed girths bilaterally today. TOTAL TREATMENT 60 mins     Circumferential Limb Measurements[de-identified]  Affected Side: bilateral lower extremities   Left (cm) Right (cm)   arch  24.0     24.3      Ankle  25.7     23.3      Calf-16cm  39.8     37.3      Calf 24cm  44.3     42.7      Knee- 40cm  48.7      46.0      Thigh 52cm  59.4     54.0        ASSESSMENT:   Treatment effectiveness and tolerance:  Patient has received her new velcro style compression garments with good fit noted. Patient's  is very skilled in home management techniques and compression use and he will assist her at home. They have all of the tools in place for long term self management and home restorative program.  Patient's  reports that he will bandage her any time it seems to be needed. Her lower legs are currently at the smallest size therapist has observed on patient in a year. They will be discharged to 6 month home restorative program at this time. Progress toward goals:   Short term goals 1 and 2 and long term goals 1 and 2 are met. PLAN OF CARE:   Changes to the plan of care:  Discharge to 6 month home restorative program with daily wearing schedule of Circaid Juxtafits. Frequency:  Re-evaluate patient in 6 months for swelling management and compression product needs. Other: In compliance with CMSs Claims Based Outcome Reporting, the following G-code set was chosen for this patient based on their primary functional limitation being treated: The outcome measure chosen to determine the severity of the functional limitation was the Life Impact Scale with a score of 23/34% which was correlated with the impairment scale. ?  Other PT/OT Primary Functional Limitations:         - GOAL STATUS:  CJ - 20%-39% impaired, limited or restricted    - D/C STATUS:  CJ - 20%-39% impaired, limited or restricted       FLOIR Bower, TEODORO

## 2017-08-10 NOTE — PROGRESS NOTES
Her bone density showed Osteopenia. Has she ever taken Fosamax or actonel?  Should be taking vitamin D 800 I.U & calcium 50 m daily

## 2017-08-21 ENCOUNTER — PATIENT OUTREACH (OUTPATIENT)
Dept: INTERNAL MEDICINE CLINIC | Age: 67
End: 2017-08-21

## 2017-09-05 RX ORDER — PEN NEEDLE, DIABETIC 30 GX3/16"
NEEDLE, DISPOSABLE MISCELLANEOUS
Qty: 1 PACKAGE | Refills: 1 | Status: SHIPPED | OUTPATIENT
Start: 2017-09-05 | End: 2017-09-21

## 2017-09-18 RX ORDER — BLOOD SUGAR DIAGNOSTIC
STRIP MISCELLANEOUS
Qty: 100 STRIP | Refills: 3 | Status: SHIPPED | OUTPATIENT
Start: 2017-09-18 | End: 2017-09-18 | Stop reason: SDUPTHER

## 2017-09-21 ENCOUNTER — CLINICAL SUPPORT (OUTPATIENT)
Dept: CARDIOLOGY CLINIC | Age: 67
End: 2017-09-21

## 2017-09-21 ENCOUNTER — OFFICE VISIT (OUTPATIENT)
Dept: CARDIOLOGY CLINIC | Age: 67
End: 2017-09-21

## 2017-09-21 VITALS
DIASTOLIC BLOOD PRESSURE: 80 MMHG | BODY MASS INDEX: 39.34 KG/M2 | SYSTOLIC BLOOD PRESSURE: 142 MMHG | WEIGHT: 259.6 LBS | HEIGHT: 68 IN | HEART RATE: 88 BPM

## 2017-09-21 DIAGNOSIS — R06.02 SHORT OF BREATH ON EXERTION: ICD-10-CM

## 2017-09-21 DIAGNOSIS — I89.0 LYMPHEDEMA OF BOTH LOWER EXTREMITIES: ICD-10-CM

## 2017-09-21 DIAGNOSIS — I44.1 MOBITZ TYPE 2 SECOND DEGREE AV BLOCK: ICD-10-CM

## 2017-09-21 DIAGNOSIS — I44.7 LBBB (LEFT BUNDLE BRANCH BLOCK): ICD-10-CM

## 2017-09-21 DIAGNOSIS — E66.01 MORBID OBESITY DUE TO EXCESS CALORIES (HCC): ICD-10-CM

## 2017-09-21 DIAGNOSIS — Z95.0 PACEMAKER: Primary | ICD-10-CM

## 2017-09-21 DIAGNOSIS — Z95.0 CARDIAC PACEMAKER IN SITU: Primary | ICD-10-CM

## 2017-09-21 NOTE — MR AVS SNAPSHOT
Visit Information Date & Time Provider Department Dept. Phone Encounter #  
 9/21/2017  2:00 PM Oneil Mack MD [facility] Tammy Collazo [phone] [id number] Your Appointments 9/25/2017 12:30 PM  
PRE OP with MD Alexandra Ash [facility]) Appt Note: Pre-Op for eye surgery on Oct 9  
 [address] [facility] [address] [facility] [address] [facility] [address] [facility] [address] [place] [address]  
  
    
 10/18/2017  1:00 PM  
ESTABLISHED PATIENT with Osorio Yanez MD  
[facility] Appt Note: one year follow up  
 [address] [facility] [address] [facility] [address]  
[address] [facility] [place] [address]  
  
    
 7/2/2018 12:00 PM  
[facility] Appt Note: [facility] Visit   
 [facility] [address] [facility] [address] [facility] [address] [facility] [address] [place] [address]  
  
    
 10/11/2018  1:00 PM  
PACEMAKER with Francesca Bryant [facility] ([facility]) Appt Note: sjm threshold/rc/Champion annual b   [address] [facility] [address] [place] [address]  
[address] [facility] [address]  
  
    
 10/11/2018  1:20 PM  
ESTABLISHED PATIENT with Oneil Mack MD  
[facility] ([facility]) Appt Note: sjm threshold/rc/Champion annual b   [address] [facility] [address] [place] [address]  
[address] [facility] [place] [address]  
  
    
  
 1/3/2018  2:15 PM  
REMOTE OFFICE VISIT with Reagan Agarwal [facility] ([facility]EDULING) Appt Note: merlin PPI/rc b 9-21-17  
 [address] [facility] [address] [place]  
[phone] [address] [place]  
  
    
 4/9/2018  8:00 AM  
REMOTE OFFICE VISIT with Alicia Calero [facility] ([facility] SCHEDULING) Appt Note: merlin PPI/rc b  
 [address] [place] [address]  
[phone]  
  
    
 7/9/2018  8:30 AM  
REMOTE OFFICE VISIT with Alicia Calero [facility] ([facility] SCHEDULING) Appt Note: merlin PPI/rc b  
 [address] [place] [address]  
[phone] Upcoming Health Maintenance Date Due  
 FOOT EXAM Q1 7/31/1959 MICROALBUMIN Q1 7/31/1959 EYE EXAM RETINAL OR DILATED Q1 7/31/1959 DTaP/Tdap/Td series (1 - Tdap) 7/31/1970 FOBT Q 1 YEAR AGE 50-75 7/31/1999 ZOSTER VACCINE AGE 60> 5/31/2009 GLAUCOMA SCREENING Q2Y 7/31/2014 Pneumococcal 65+ Low/Medium Risk (1 of 2 - PCV13) 7/31/2014 INFLUENZA AGE 9 TO ADULT 8/1/2017 LIPID PANEL Q1 10/19/2017 HEMOGLOBIN A1C Q6M 11/27/2017 MEDICARE YEARLY EXAM 6/21/2018 BREAST CANCER SCRN MAMMOGRAM 7/14/2019 Allergies as of 9/21/2017  Review Complete On: 9/21/2017 By: Fannie Calvin MD  
  
 Severity Noted Reaction Type Reactions Bee Sting [Sting, Bee]  10/13/2010    Hives Heparin Analogues  06/22/2016    Vertigo No Known Drug Allergies  10/13/2010    Other (comments) Current Immunizations  Never Reviewed No immunizations on file. Not reviewed this visit Vitals BP Pulse Height(growth percentile) Weight(growth percentile) BMI OB Status 142/80 (BP 1 Location: Left arm, BP Patient Position: Sitting) 88 5' 8\" (1.727 m) 259 lb 9.6 oz (117.8 kg) 39.47 kg/m2 Hysterectomy Smoking Status Never Smoker Vitals History BMI and BSA Data Body Mass Index Body Surface Area  
 39.47 kg/m 2 2.38 m 2 Preferred Pharmacy Pharmacy Name Phone [facility] [address], [address] [place] [address] [phone] Your Updated Medication List  
  
   
This list is accurate as of: 9/21/17  3:04 PM.  Always use your most recent med list.  
  
  
  
  
 chlorthalidone 25 mg tablet Commonly known as:  Chalino Ma Take 1 Tab by mouth daily. glucose blood VI test strips strip Commonly known as:  CONTOUR NEXT STRIPS  
USE QID and PRN,  Dx code E11.9  
  
 insulin aspart 100 unit/mL Inpn Commonly known as:  Annelieseelen Arauz 10 units before meals. Tid. LINZESS 145 mcg Cap capsule Generic drug:  linaclotide Take 145 mcg by mouth Daily (before breakfast). Patient holds for Diarrhea  
  
 lisinopril 20 mg tablet Commonly known as:  Oneil Adilene Take  by mouth daily. OTHER  
1 Tab daily. Indications: Active-PK  
  
 therapeutic multivitamin tablet Commonly known as:  [facility] Take 1 Tab by mouth daily. To-Do List   
 02/19/2018 10:00 AM  
  Appointment with Yosi Rosales at [address] ([phone]) Introducing \[facility] [place] [facility]\"" & [facility]! Dear Sohail Eric: Thank you for requesting a Powerset account. Our records indicate that you already have an active Powerset account. You can access your account anytime at [email or web address] Did you know that you can access your hospital and ER discharge instructions at any time in Powerset? You can also review all of your test results from your hospital stay or ER visit. Additional Information If you have questions, please visit the Frequently Asked Questions section of the Powerset website at [email or web address] Remember, Powerset is NOT to be used for urgent needs. For medical emergencies, dial 911. Now available from your iPhone and Android! Please provide this summary of care documentation to your next provider. Your primary care clinician is listed as Janeth Barclay. If you have any questions after today's visit, please call [phone].

## 2017-09-21 NOTE — PROGRESS NOTES
Cardiac Electrophysiology Office Note     Subjective:      Elizabeth Turner is a 75 y.o. patient who is seen for evaluation of pacemaker  She had it for second degree av block LBBB  She is less short of breaths since then but has chronic MAXWELL  + lymphedema  No orthopnea    Patient Active Problem List   Diagnosis Code    Insulin dependent diabetes mellitus ([facility] E11.9, Z79.4    Essential hypertension with goal blood pressure less than 140/90 I10    History of right breast cancer Z85.3    Lymphedema of both lower extremities I89.0    MAXWELL (dyspnea on exertion) R06.09    Joint pain M25.50    Hypertension I10    Morbidly obese ([facility] E66.01    Osteoarthritis M19.90    Syncope R55    Abdominal pain R10.9    Alternating constipation and diarrhea R19.8    Lymphedema I89.0    LBBB (left bundle branch block) I44.7    ACP (advance care planning) Z71.89     Current Outpatient Prescriptions   Medication Sig Dispense Refill    OTHER 1 Tab daily. Indications: Active-PK      glucose blood VI test strips (CONTOUR NEXT STRIPS) strip USE QID and PRN,  Dx code E11.9 100 Strip 3    lisinopril (PRINIVIL, ZESTRIL) 20 mg tablet Take  by mouth daily.  chlorthalidone (HYGROTEN) 25 mg tablet Take 1 Tab by mouth daily. 30 Tab 0    therapeutic multivitamin (THERAGRAN) tablet Take 1 Tab by mouth daily.  insulin aspart (NOVOLOG) 100 unit/mL inpn 10 units before meals. Tid. 3 mL 2    linaclotide (LINZESS) 145 mcg cap capsule Take 145 mcg by mouth Daily (before breakfast).  Patient holds for Diarrhea       Allergies   Allergen Reactions    Bee Sting [Sting, Bee] Hives    Heparin Analogues Vertigo    No Known Drug Allergies Other (comments)     Past Medical History:   Diagnosis Date    Cancer ([facility]     right breast ca with 12 lymph nodes involved    Diabetes ([facility]     Diverticulitis     Hypercholesterolemia     Hypertension     LBBB (left bundle branch block) 5/27/2017    Lymphedema of both lower extremities  Morbid obesity ([facility]     Osteoarthritis 10/13/2010    Psychotic disorder     panic attacks    Radiation therapy complication 7616    and chemo    Retinopathy due to secondary diabetes ([facility]     Thyroid lump     nodule     Past Surgical History:   Procedure Laterality Date    COLONOSCOPY N/A 8/24/2016    COLONOSCOPY performed by Vianey Juárez MD at [address] HX CATARACT REMOVAL      HX CHOLECYSTECTOMY      HX GYN      hysterectomy    HX HYSTERECTOMY      HX MASTECTOMY Right 2012    HX REFRACTIVE SURGERY      HX RETINAL DETACHMENT REPAIR      MARTHA STEREO  BX BREAST LT 1ST LESION W/CLIP AND SPECIMEN  2005    DE INS NEW/RPLCMT PRM PM W/TRANSV ELTRD ATRIAL&VENT  5/30/2017          Family History   Problem Relation Age of Onset    Heart Disease Mother     Heart Disease Father     Breast Cancer Sister      Social History   Substance Use Topics    Smoking status: Never Smoker    Smokeless tobacco: Never Used    Alcohol use No        Review of Systems:   Constitutional: Negative for fever, chills, weight loss, + malaise/fatigue. HEENT: Negative for nosebleeds, vision changes. Respiratory: Negative for cough, hemoptysis  Cardiovascular: Negative for chest pain, palpitations, orthopnea, claudication, + leg swelling, no syncope, and PND. Gastrointestinal: Negative for nausea, vomiting, diarrhea, blood in stool and melena. Genitourinary: Negative for dysuria, and hematuria. Musculoskeletal: Negative for myalgias, arthralgia. Skin: Negative for rash. Heme: Does not bleed or bruise easily. Neurological: Negative for speech change and focal weakness     Objective:     Visit Vitals    /80 (BP 1 Location: Left arm, BP Patient Position: Sitting)    Pulse 88    Ht 5' 8\" (1.727 m)    Wt 259 lb 9.6 oz (117.8 kg)    BMI 39.47 kg/m2      Physical Exam:   Constitutional: well-developed and well-nourished. No respiratory distress. Head: Normocephalic and atraumatic.    Eyes: Pupils are equal, round  ENT: hearing normal  Neck: supple. No JVD present. Cardiovascular: Normal rate, regular rhythm. Exam reveals no gallop and no friction rub. No murmur heard. Pulmonary/Chest: Effort normal and breath sounds normal. No wheezes. Abdominal: morbidly obese  Musculoskeletal: + edema. Neurological: alert,oriented. Skin: Skin is warm and dry  Psychiatric: normal mood and affect. Behavior is normal. Judgment and thought content normal.         Assessment/Plan:       ICD-10-CM ICD-9-CM    1. Pacemaker Z95.0 V45.01    2. Morbid obesity due to excess calories (HCC) E66.01 278.01    3. LBBB (left bundle branch block) I44.7 426.3    4. Mobitz type 2 second degree AV block I44.1 426.12    5. Lymphedema of both lower extremities I89.0 457.1    6. Short of breath on exertion R06.02 786.05       Pacemaker check shows proper function  She is feeling less short of breaths but this does not totally resolved  She has this chronically   agrees  She has lymphedema of the legs  Follow-up Disposition:  Return in about 1 year (around 9/21/2018). I check pacemaker every 3 months in office because she cannot use MERLIN box at her home in [place]  Thank you for involving me in this patient's care and please call with further concerns or questions. Meagan Abrams M.D.   Electrophysiology/Cardiology  [facility]  [address], [address]  [facility], [facility] [address]                             [address] [place] [address]  [phone]

## 2017-09-21 NOTE — PROGRESS NOTES
See scanned Pacemaker Report in Chart Review. Declined  update-pacemaker dependent. Chargeable visit.

## 2017-09-25 ENCOUNTER — OFFICE VISIT (OUTPATIENT)
Dept: INTERNAL MEDICINE CLINIC | Age: 67
End: 2017-09-25

## 2017-09-25 ENCOUNTER — DOCUMENTATION ONLY (OUTPATIENT)
Dept: INTERNAL MEDICINE CLINIC | Age: 67
End: 2017-09-25

## 2017-09-25 ENCOUNTER — HOSPITAL ENCOUNTER (OUTPATIENT)
Dept: LAB | Age: 67
Discharge: HOME | End: 2017-09-25
Payer: MEDICARE

## 2017-09-25 VITALS
RESPIRATION RATE: 16 BRPM | TEMPERATURE: 98.7 F | OXYGEN SATURATION: 97 % | DIASTOLIC BLOOD PRESSURE: 82 MMHG | SYSTOLIC BLOOD PRESSURE: 152 MMHG | HEART RATE: 94 BPM | WEIGHT: 257.8 LBS | HEIGHT: 68 IN | BODY MASS INDEX: 39.07 KG/M2

## 2017-09-25 DIAGNOSIS — H53.8 BLURRED VISION, LEFT EYE: ICD-10-CM

## 2017-09-25 DIAGNOSIS — Z23 ENCOUNTER FOR IMMUNIZATION: ICD-10-CM

## 2017-09-25 DIAGNOSIS — I10 ESSENTIAL HYPERTENSION: Primary | ICD-10-CM

## 2017-09-25 DIAGNOSIS — Z01.818 PRE-OP EXAM: ICD-10-CM

## 2017-09-25 PROCEDURE — 85027 COMPLETE CBC AUTOMATED: CPT

## 2017-09-25 PROCEDURE — 83036 HEMOGLOBIN GLYCOSYLATED A1C: CPT

## 2017-09-25 PROCEDURE — 80053 COMPREHEN METABOLIC PANEL: CPT

## 2017-09-25 PROCEDURE — 80061 LIPID PANEL: CPT

## 2017-09-25 RX ORDER — INSULIN LISPRO 100 [IU]/ML
INJECTION, SOLUTION INTRAVENOUS; SUBCUTANEOUS
Qty: 1 VIAL | Refills: 1
Start: 2017-09-25 | End: 2017-10-11 | Stop reason: SDUPTHER

## 2017-09-25 NOTE — PROGRESS NOTES
Preoperative Evaluation    Date of Exam: 2017    Kaylin Bonner is a 75 y.o. female (:1950) who presents for preoperative evaluation. Latex Allergy: no    Problem List:     Patient Active Problem List    Diagnosis Date Noted    ACP (advance care planning) 2017    Syncope 2017    Abdominal pain 2017    Alternating constipation and diarrhea 2017    Lymphedema 2017    LBBB (left bundle branch block) 2017    Insulin dependent diabetes mellitus ([address] 2016    Essential hypertension with goal blood pressure less than 140/90 2016    History of right breast cancer 2016    Lymphedema of both lower extremities 2016    MAXWELL (dyspnea on exertion) 10/13/2010    Joint pain 10/13/2010    Hypertension 10/13/2010    Morbidly obese ([address] 10/13/2010    Osteoarthritis 10/13/2010     Medical History:     Past Medical History:   Diagnosis Date    Cancer ([address]     right breast ca with 12 lymph nodes involved    Diabetes ([address]     Diverticulitis     Hypercholesterolemia     Hypertension     LBBB (left bundle branch block) 2017    Lymphedema of both lower extremities     Morbid obesity ([address]     Osteoarthritis 10/13/2010    Psychotic disorder     panic attacks    Radiation therapy complication 722    and chemo    Retinopathy due to secondary diabetes (HCC)     Thyroid lump     nodule     Allergies: Allergies   Allergen Reactions    Bee Sting [Sting, Bee] Hives    Heparin Analogues Vertigo    No Known Drug Allergies Other (comments)      Medications:     Current Outpatient Prescriptions   Medication Sig    glucose blood VI test strips (CONTOUR NEXT STRIPS) strip USE QID and PRN,  Dx code E11.9    lisinopril (PRINIVIL, ZESTRIL) 20 mg tablet Take  by mouth daily.  chlorthalidone (HYGROTEN) 25 mg tablet Take 1 Tab by mouth daily.  therapeutic multivitamin (THERAGRAN) tablet Take 1 Tab by mouth daily.     insulin aspart (NOVOLOG) 100 unit/mL inpn 10 units before meals. Tid.  OTHER 1 Tab daily. Indications: Active-PK    linaclotide (LINZESS) 145 mcg cap capsule Take 145 mcg by mouth Daily (before breakfast). Patient holds for Diarrhea     No current facility-administered medications for this visit. Surgical History:     Past Surgical History:   Procedure Laterality Date    COLONOSCOPY N/A 8/24/2016    COLONOSCOPY performed by Sachin Lam MD at [address] HX CATARACT REMOVAL      HX CHOLECYSTECTOMY      HX GYN      hysterectomy    HX HYSTERECTOMY      HX MASTECTOMY Right 2012    HX REFRACTIVE SURGERY      HX RETINAL DETACHMENT REPAIR      MARTHA STEREO  BX BREAST LT 1ST LESION W/CLIP AND SPECIMEN  2005    CT INS NEW/RPLCMT PRM PM W/TRANSV ELTRD ATRIAL&VENT  5/30/2017          Social History:     Social History     Social History    Marital status:      Spouse name: N/A    Number of children: N/A    Years of education: N/A     Social History Main Topics    Smoking status: Never Smoker    Smokeless tobacco: Never Used    Alcohol use No    Drug use: No    Sexual activity: Yes     Partners: Male     Other Topics Concern    None     Social History Narrative    ** Merged History Encounter **            Anesthesia Complications: None  History of abnormal bleeding : None  History of Blood Transfusions: no  Health Care Directive or Living Will: no    Objective:     ROS:    No dyspnea or chest pain on exertion. No abdominal pain, change in bowel habits, black or bloody stools. No urinary tract symptoms. GYN ROSNo neurological complaints. OBJECTIVE:   The patient appears well, alert, oriented x 3, in no distress. Visit Vitals    /82 (BP 1 Location: Right arm, BP Patient Position: Sitting)    Pulse 94    Temp 98.7 °F (37.1 °C) (Oral)    Resp 16    Ht 5' 8\" (1.727 m)    Wt 257 lb 12.8 oz (116.9 kg)    SpO2 97%    BMI 39.2 kg/m2     HEENT:ENT normal.  Neck supple.  No adenopathy or thyromegaly. NICK. Chest: Lungs are clear, good air entry, no wheezes, rhonchi or rales. Cardiovascular: S1 and S2 normal, no murmurs, regular rate and rhythm. Abdomen: soft without tenderness, guarding, mass or organomegaly. Extremities: show no edema, normal peripheral pulses. Neurological: is normal, no focal findings. IMPRESSION:   Diagnoses and all orders for this visit:    1. Essential hypertension  -     METABOLIC PANEL, COMPREHENSIVE  -     CBC W/O DIFF   Reading high in the office today as she has not taking her medications in the morning. Recommended taking in the morning of Sx.    2. Insulin dependent diabetes mellitus ([facility]  -     HEMOGLOBIN A1C WITH EAG  -     LIPID PANEL    3. Pre-op exam      Schedule for cataract Sx on October 9th. Cleared for the procedure. 4. Blurred vision, left eye     Follows by cardiology , going for cataract procedure.             Jessica Montilla MD   9/25/2017

## 2017-09-26 LAB
ALBUMIN SERPL-MCNC: 3.9 G/DL (ref 3.6–4.8)
ALBUMIN/GLOB SERPL: 1.5 {RATIO} (ref 1.2–2.2)
ALP SERPL-CCNC: 107 IU/L (ref 39–117)
ALT SERPL-CCNC: 49 IU/L (ref 0–32)
AST SERPL-CCNC: 32 IU/L (ref 0–40)
BILIRUB SERPL-MCNC: 0.5 MG/DL (ref 0–1.2)
BUN SERPL-MCNC: 17 MG/DL (ref 8–27)
BUN/CREAT SERPL: 23 (ref 12–28)
CALCIUM SERPL-MCNC: 9.8 MG/DL (ref 8.7–10.3)
CHLORIDE SERPL-SCNC: 100 MMOL/L (ref 96–106)
CHOLEST SERPL-MCNC: 219 MG/DL (ref 100–199)
CO2 SERPL-SCNC: 26 MMOL/L (ref 18–29)
CREAT SERPL-MCNC: 0.74 MG/DL (ref 0.57–1)
ERYTHROCYTE [DISTWIDTH] IN BLOOD BY AUTOMATED COUNT: 14.7 % (ref 12.3–15.4)
EST. AVERAGE GLUCOSE BLD GHB EST-MCNC: 154 MG/DL
GLOBULIN SER CALC-MCNC: 2.6 G/DL (ref 1.5–4.5)
GLUCOSE SERPL-MCNC: 136 MG/DL (ref 65–99)
HBA1C MFR BLD: 7 % (ref 4.8–5.6)
HCT VFR BLD AUTO: 35.6 % (ref 34–46.6)
HDLC SERPL-MCNC: 46 MG/DL
HGB BLD-MCNC: 11.9 G/DL (ref 11.1–15.9)
INTERPRETATION, 910389: NORMAL
LDLC SERPL CALC-MCNC: 143 MG/DL (ref 0–99)
Lab: NORMAL
MCH RBC QN AUTO: 32.1 PG (ref 26.6–33)
MCHC RBC AUTO-ENTMCNC: 33.4 G/DL (ref 31.5–35.7)
MCV RBC AUTO: 96 FL (ref 79–97)
PLATELET # BLD AUTO: 214 X10E3/UL (ref 150–379)
POTASSIUM SERPL-SCNC: 4.4 MMOL/L (ref 3.5–5.2)
PROT SERPL-MCNC: 6.5 G/DL (ref 6–8.5)
RBC # BLD AUTO: 3.71 X10E6/UL (ref 3.77–5.28)
SODIUM SERPL-SCNC: 140 MMOL/L (ref 134–144)
TRIGL SERPL-MCNC: 150 MG/DL (ref 0–149)
VLDLC SERPL CALC-MCNC: 30 MG/DL (ref 5–40)
WBC # BLD AUTO: 6.9 X10E3/UL (ref 3.4–10.8)

## 2017-10-11 RX ORDER — FUROSEMIDE 20 MG/1
TABLET ORAL
Status: CANCELLED | OUTPATIENT
Start: 2017-10-11

## 2017-10-12 DIAGNOSIS — M79.89 LEG SWELLING: Primary | ICD-10-CM

## 2017-10-12 RX ORDER — FUROSEMIDE 20 MG/1
20 TABLET ORAL
Qty: 90 TAB | Refills: 0 | Status: SHIPPED | OUTPATIENT
Start: 2017-10-15 | End: 2018-05-10

## 2017-10-13 RX ORDER — LISINOPRIL 20 MG/1
20 TABLET ORAL DAILY
Qty: 90 TAB | Refills: 0 | Status: SHIPPED | OUTPATIENT
Start: 2017-10-13

## 2017-10-13 RX ORDER — INSULIN LISPRO 100 [IU]/ML
INJECTION, SOLUTION INTRAVENOUS; SUBCUTANEOUS
Qty: 1 VIAL | Refills: 1
Start: 2017-10-13 | End: 2017-10-16 | Stop reason: SDUPTHER

## 2017-10-16 RX ORDER — INSULIN ASPART 100 [IU]/ML
INJECTION, SOLUTION INTRAVENOUS; SUBCUTANEOUS
Qty: 5 PEN | Refills: 2 | Status: SHIPPED | OUTPATIENT
Start: 2017-10-16

## 2017-10-16 RX ORDER — INSULIN LISPRO 100 [IU]/ML
INJECTION, SOLUTION INTRAVENOUS; SUBCUTANEOUS
Qty: 1 VIAL | Refills: 1 | Status: SHIPPED | OUTPATIENT
Start: 2017-10-16 | End: 2017-10-16

## 2017-11-03 ENCOUNTER — TELEPHONE (OUTPATIENT)
Dept: CARDIOLOGY CLINIC | Age: 67
End: 2017-11-03

## 2017-11-03 NOTE — TELEPHONE ENCOUNTER
Voicemail left stating Merlin is disconnected at home. She can call tech support [phone] for assistance or call me on Monday.

## 2017-11-08 ENCOUNTER — PATIENT OUTREACH (OUTPATIENT)
Dept: INTERNAL MEDICINE CLINIC | Age: 67
End: 2017-11-08

## 2017-11-08 NOTE — PROGRESS NOTES
NN received voicemail from patients  stating that they have moved to [place] and will not be return to our office. They will contact the office soon to request her records.  Will remove Dr Almita Talavera as PCP

## 2017-11-08 NOTE — PROGRESS NOTES
NN reviewed patients EMR. Patient was noted to have a recent NO SHOW visit. Patient has been in in Sept for preop evaluation but has not been in for routine care of DM / HTN. NN attempted to reach patient to discuss management of these conditions and to reschedule routine follow up. NN unable to reach the patient and left a voicemail requesting a return call with NN's contact info provided.

## 2018-01-04 RX ORDER — BLOOD SUGAR DIAGNOSTIC
STRIP MISCELLANEOUS
Qty: 100 STRIP | Refills: 3 | Status: SHIPPED | OUTPATIENT
Start: 2018-01-04

## 2018-02-19 ENCOUNTER — APPOINTMENT (OUTPATIENT)
Dept: PHYSICAL THERAPY | Age: 68
End: 2018-02-19

## 2018-04-26 ENCOUNTER — OFFICE VISIT (OUTPATIENT)
Dept: CARDIOLOGY CLINIC | Age: 68
End: 2018-04-26

## 2018-04-26 ENCOUNTER — TELEPHONE (OUTPATIENT)
Dept: CARDIOLOGY CLINIC | Age: 68
End: 2018-04-26

## 2018-04-26 DIAGNOSIS — Z95.0 CARDIAC PACEMAKER IN SITU: Primary | ICD-10-CM

## 2018-04-26 NOTE — TELEPHONE ENCOUNTER
Pt's  calling because the pt has been having issues with her pacemaker and sob (not at time of call). They moved to [place] and the pt needs to have her pacemaker checked but they aren't sure what they need to do. They would like a recommendation on where they should go. Pt's  can be reached @ [phone]. Thanks!

## 2018-04-26 NOTE — TELEPHONE ENCOUNTER
Verified patient with two types of identifiers. Advised him to contact her insurance to see what cardiologist are near her or can look on computer. He will check the computer for a cardiologist near them in [place].

## 2018-12-07 NOTE — PROGRESS NOTES
Patients  returned voicemail NN left a month ago. Patients  reports patient is doing well with her new pacemaker. She has been trying to eat better and has had a weight loss around 30 lbs and her blood glucose has been under a lot better control since. She is seeing Dr Sarah Haq on 9/21/17 and will have her pacer checked that day also. NN reviewed this appt info with patients . Last OV with PCP 6/20 and annual wellness visit completed. No questions or concerns at this time. NN will now close this NN episode. [FreeTextEntry1] : limited past records reviewed and documented or updated herein

## 2020-09-11 NOTE — TELEPHONE ENCOUNTER
----- Message from Arya Rutledge IV, MD sent at 9/11/2020  9:16 AM EDT -----  I recommend she start atorvastatin 20 mg daily for primary prevention.  CMP and lipids in 6 weeks.  Her cac score is 90 percentile for age.   Last refill 11/1/16  Last office visit 1/20/17